# Patient Record
Sex: FEMALE | Race: WHITE | NOT HISPANIC OR LATINO | Employment: OTHER | URBAN - METROPOLITAN AREA
[De-identification: names, ages, dates, MRNs, and addresses within clinical notes are randomized per-mention and may not be internally consistent; named-entity substitution may affect disease eponyms.]

---

## 2017-03-02 ENCOUNTER — HISTORICAL (OUTPATIENT)
Dept: LAB | Facility: HOSPITAL | Age: 77
End: 2017-03-02

## 2017-06-26 ENCOUNTER — HISTORICAL (OUTPATIENT)
Dept: LAB | Facility: HOSPITAL | Age: 77
End: 2017-06-26

## 2017-06-26 LAB
ALBUMIN SERPL-MCNC: 3.1 GM/DL (ref 3.4–5)
ALBUMIN/GLOB SERPL: 0.7 {RATIO}
ALP SERPL-CCNC: 119 UNIT/L (ref 38–126)
ALT SERPL-CCNC: 78 UNIT/L (ref 12–78)
AST SERPL-CCNC: 128 UNIT/L (ref 15–37)
BILIRUB SERPL-MCNC: 1.5 MG/DL (ref 0.2–1)
BILIRUBIN DIRECT+TOT PNL SERPL-MCNC: 0.7 MG/DL (ref 0–0.2)
BILIRUBIN DIRECT+TOT PNL SERPL-MCNC: 0.8 MG/DL (ref 0–0.8)
BUN SERPL-MCNC: 20 MG/DL (ref 7–18)
CALCIUM SERPL-MCNC: 9 MG/DL (ref 8.5–10.1)
CHLORIDE SERPL-SCNC: 109 MMOL/L (ref 98–107)
CHOLEST SERPL-MCNC: 133 MG/DL (ref 0–200)
CHOLEST/HDLC SERPL: 2.2 {RATIO} (ref 0–4)
CO2 SERPL-SCNC: 25 MMOL/L (ref 21–32)
CREAT SERPL-MCNC: 0.67 MG/DL (ref 0.55–1.02)
GLOBULIN SER-MCNC: 4.5 GM/DL (ref 2.4–3.5)
GLUCOSE SERPL-MCNC: 110 MG/DL (ref 74–106)
HDLC SERPL-MCNC: 61 MG/DL (ref 35–60)
LDLC SERPL CALC-MCNC: 59 MG/DL (ref 0–129)
POTASSIUM SERPL-SCNC: 4.8 MMOL/L (ref 3.5–5.1)
PROT SERPL-MCNC: 7.6 GM/DL (ref 6.4–8.2)
SODIUM SERPL-SCNC: 141 MMOL/L (ref 136–145)
T4 FREE SERPL-MCNC: 1.35 NG/DL (ref 0.76–1.46)
TRIGL SERPL-MCNC: 67 MG/DL (ref 30–150)
TSH SERPL-ACNC: 1.55 MIU/ML (ref 0.36–3.74)
VLDLC SERPL CALC-MCNC: 13 MG/DL

## 2017-12-27 ENCOUNTER — HISTORICAL (OUTPATIENT)
Dept: LAB | Facility: HOSPITAL | Age: 77
End: 2017-12-27

## 2017-12-27 LAB
ABS NEUT (OLG): 3.68 X10(3)/MCL (ref 2.1–9.2)
ALBUMIN SERPL-MCNC: 3.8 GM/DL (ref 3.4–5)
ALBUMIN/GLOB SERPL: 0.8 RATIO (ref 1.1–2)
ALP SERPL-CCNC: 96 UNIT/L (ref 38–126)
ALT SERPL-CCNC: 26 UNIT/L (ref 12–78)
ANISOCYTOSIS BLD QL SMEAR: 1
APPEARANCE, UA: ABNORMAL
AST SERPL-CCNC: 41 UNIT/L (ref 15–37)
BACTERIA SPEC CULT: ABNORMAL /HPF
BILIRUB SERPL-MCNC: 1.1 MG/DL (ref 0.2–1)
BILIRUB UR QL STRIP: NEGATIVE
BILIRUBIN DIRECT+TOT PNL SERPL-MCNC: 0.3 MG/DL (ref 0–0.5)
BILIRUBIN DIRECT+TOT PNL SERPL-MCNC: 0.8 MG/DL (ref 0–0.8)
BUN SERPL-MCNC: 16 MG/DL (ref 7–18)
CALCIUM SERPL-MCNC: 8.8 MG/DL (ref 8.5–10.1)
CHLORIDE SERPL-SCNC: 110 MMOL/L (ref 98–107)
CO2 SERPL-SCNC: 28 MMOL/L (ref 21–32)
COLOR UR: YELLOW
CREAT SERPL-MCNC: 0.75 MG/DL (ref 0.55–1.02)
ERYTHROCYTE [DISTWIDTH] IN BLOOD BY AUTOMATED COUNT: 14.5 % (ref 11.5–17)
GLOBULIN SER-MCNC: 4.5 GM/DL (ref 2.4–3.5)
GLUCOSE (UA): NEGATIVE
GLUCOSE SERPL-MCNC: 100 MG/DL (ref 74–106)
HCT VFR BLD AUTO: 38.4 % (ref 37–47)
HGB BLD-MCNC: 12.8 GM/DL (ref 12–16)
HGB UR QL STRIP: ABNORMAL
KETONES UR QL STRIP: NEGATIVE
LEUKOCYTE ESTERASE UR QL STRIP: ABNORMAL
LYMPHOCYTES NFR BLD MANUAL: 8 %
LYMPHOCYTES NFR BLD MANUAL: 9 % (ref 13–40)
MCH RBC QN AUTO: 31.3 PG (ref 27–31)
MCHC RBC AUTO-ENTMCNC: 33.3 GM/DL (ref 33–36)
MCV RBC AUTO: 93.9 FL (ref 80–94)
MONOCYTES NFR BLD MANUAL: 11 % (ref 2–11)
NEUTROPHILS NFR BLD MANUAL: 72 % (ref 47–80)
NITRITE UR QL STRIP: NEGATIVE
OVALOCYTES BLD QL SMEAR: 1
PH UR STRIP: 6 [PH] (ref 5–9)
PLATELET # BLD AUTO: 67 X10(3)/MCL (ref 130–400)
PLATELET # BLD EST: ABNORMAL 10*3/UL
PMV BLD AUTO: 9.8 FL (ref 7.4–10.4)
POLYCHROMASIA BLD QL SMEAR: 1
POTASSIUM SERPL-SCNC: 4.5 MMOL/L (ref 3.5–5.1)
PROT SERPL-MCNC: 8.3 GM/DL (ref 6.4–8.2)
PROT UR QL STRIP: NEGATIVE
RBC # BLD AUTO: 4.09 X10(6)/MCL (ref 4.2–5.4)
RBC #/AREA URNS HPF: 15 /HPF (ref 0–2)
SODIUM SERPL-SCNC: 139 MMOL/L (ref 136–145)
SP GR UR STRIP: 1.01 (ref 1–1.03)
SQUAMOUS EPITHELIAL, UA: ABNORMAL
T4 FREE SERPL-MCNC: 1.03 NG/DL (ref 0.76–1.46)
TSH SERPL-ACNC: 5.15 MIU/ML (ref 0.36–3.74)
UROBILINOGEN UR STRIP-ACNC: 0.2
WBC # SPEC AUTO: 5.9 X10(3)/MCL (ref 4.5–11.5)
WBC #/AREA URNS HPF: 89 /HPF (ref 0–3)

## 2018-08-28 ENCOUNTER — HISTORICAL (OUTPATIENT)
Dept: LAB | Facility: HOSPITAL | Age: 78
End: 2018-08-28

## 2018-08-28 LAB
CHOLEST SERPL-MCNC: 125 MG/DL (ref 0–200)
CHOLEST/HDLC SERPL: 2.1 {RATIO} (ref 0–4)
HDLC SERPL-MCNC: 60 MG/DL (ref 35–60)
LDLC SERPL CALC-MCNC: 55 MG/DL (ref 0–129)
PROGEST SERPL-MCNC: <0.21 NG/ML
T4 FREE SERPL-MCNC: 1.5 NG/DL (ref 0.76–1.46)
TRIGL SERPL-MCNC: 52 MG/DL (ref 30–150)
TSH SERPL-ACNC: 0.29 MIU/L (ref 0.36–3.74)
VLDLC SERPL CALC-MCNC: 10 MG/DL

## 2018-12-18 ENCOUNTER — HISTORICAL (OUTPATIENT)
Dept: RADIOLOGY | Facility: HOSPITAL | Age: 78
End: 2018-12-18

## 2018-12-18 LAB
ABS NEUT (OLG): 2.28 X10(3)/MCL (ref 2.1–9.2)
ALBUMIN SERPL-MCNC: 4.1 GM/DL (ref 3.4–5)
ALBUMIN/GLOB SERPL: 1 {RATIO}
ALP SERPL-CCNC: 76 UNIT/L (ref 38–126)
ALT SERPL-CCNC: 20 UNIT/L (ref 12–78)
ANISOCYTOSIS BLD QL SMEAR: 0
APTT PPP: 33.3 SECOND(S) (ref 24.8–36.9)
AST SERPL-CCNC: 27 UNIT/L (ref 15–37)
BASOPHILS NFR BLD MANUAL: 1 % (ref 0–2)
BILIRUB SERPL-MCNC: 1.5 MG/DL (ref 0.2–1)
BILIRUBIN DIRECT+TOT PNL SERPL-MCNC: 0.2 MG/DL (ref 0–0.2)
BILIRUBIN DIRECT+TOT PNL SERPL-MCNC: 1.3 MG/DL (ref 0–0.8)
BUN SERPL-MCNC: 18 MG/DL (ref 7–18)
CALCIUM SERPL-MCNC: 9.1 MG/DL (ref 8.5–10.1)
CHLORIDE SERPL-SCNC: 105 MMOL/L (ref 98–107)
CO2 SERPL-SCNC: 27 MMOL/L (ref 21–32)
CREAT SERPL-MCNC: 0.75 MG/DL (ref 0.55–1.02)
EOSINOPHIL NFR BLD MANUAL: 2 % (ref 0–8)
ERYTHROCYTE [DISTWIDTH] IN BLOOD BY AUTOMATED COUNT: 14.4 % (ref 11.5–17)
GLOBULIN SER-MCNC: 4 GM/DL (ref 2.4–3.5)
GLUCOSE SERPL-MCNC: 101 MG/DL (ref 74–106)
HCT VFR BLD AUTO: 38.7 % (ref 37–47)
HGB BLD-MCNC: 12.2 GM/DL (ref 12–16)
HYPOCHROMIA BLD QL SMEAR: 0
INR PPP: 1.1 (ref 0–1.3)
LYMPHOCYTES NFR BLD MANUAL: 17 % (ref 13–40)
MACROCYTES BLD QL SMEAR: 0
MCH RBC QN AUTO: 27.5 PG (ref 27–31)
MCHC RBC AUTO-ENTMCNC: 31.5 GM/DL (ref 33–36)
MCV RBC AUTO: 87.2 FL (ref 80–94)
MICROCYTES BLD QL SMEAR: 0
MONOCYTES NFR BLD MANUAL: 8 % (ref 2–11)
NEUTROPHILS NFR BLD MANUAL: 72 % (ref 47–80)
PLATELET # BLD AUTO: 81 X10(3)/MCL (ref 130–400)
PLATELET # BLD EST: ABNORMAL 10*3/UL
PMV BLD AUTO: 9.8 FL (ref 7.4–10.4)
POC CREATININE: 0.7 MG/DL (ref 0.6–1.3)
POIKILOCYTOSIS BLD QL SMEAR: 1
POLYCHROMASIA BLD QL SMEAR: 0
POTASSIUM SERPL-SCNC: 3.7 MMOL/L (ref 3.5–5.1)
PROT SERPL-MCNC: 8.1 GM/DL (ref 6.4–8.2)
PROTHROMBIN TIME: 14.2 SECOND(S) (ref 12.2–14.7)
RBC # BLD AUTO: 4.44 X10(6)/MCL (ref 4.2–5.4)
SODIUM SERPL-SCNC: 139 MMOL/L (ref 136–145)
T4 FREE SERPL-MCNC: 1.37 NG/DL (ref 0.76–1.46)
TSH SERPL-ACNC: 0.31 MIU/L (ref 0.36–3.74)
WBC # SPEC AUTO: 3.8 X10(3)/MCL (ref 4.5–11.5)

## 2019-06-17 ENCOUNTER — HISTORICAL (OUTPATIENT)
Dept: LAB | Facility: HOSPITAL | Age: 79
End: 2019-06-17

## 2019-06-17 LAB
APPEARANCE, UA: CLEAR
BACTERIA SPEC CULT: ABNORMAL /HPF
BILIRUB UR QL STRIP: NEGATIVE
COLOR UR: ABNORMAL
GLUCOSE (UA): NEGATIVE
HGB UR QL STRIP: NEGATIVE
KETONES UR QL STRIP: NEGATIVE
LEUKOCYTE ESTERASE UR QL STRIP: ABNORMAL
NITRITE UR QL STRIP: NEGATIVE
PH UR STRIP: 6.5 [PH] (ref 5–9)
PROT UR QL STRIP: NEGATIVE
RBC #/AREA URNS HPF: ABNORMAL /[HPF]
SP GR UR STRIP: 1.02 (ref 1–1.03)
SQUAMOUS EPITHELIAL, UA: ABNORMAL
T4 FREE SERPL-MCNC: 1.19 NG/DL (ref 0.76–1.46)
TSH SERPL-ACNC: 2.32 MIU/L (ref 0.36–3.74)
UROBILINOGEN UR STRIP-ACNC: 1
WBC #/AREA URNS HPF: 25 /HPF (ref 0–3)

## 2019-07-30 ENCOUNTER — HISTORICAL (OUTPATIENT)
Dept: RADIOLOGY | Facility: HOSPITAL | Age: 79
End: 2019-07-30

## 2019-07-30 LAB — POC CREATININE: 0.7 MG/DL (ref 0.6–1.3)

## 2019-12-16 ENCOUNTER — HISTORICAL (OUTPATIENT)
Dept: LAB | Facility: HOSPITAL | Age: 79
End: 2019-12-16

## 2019-12-16 LAB
APPEARANCE, UA: ABNORMAL
BACTERIA SPEC CULT: ABNORMAL /HPF
BILIRUB UR QL STRIP: NEGATIVE
COLOR UR: YELLOW
GLUCOSE (UA): NEGATIVE
HGB UR QL STRIP: ABNORMAL
KETONES UR QL STRIP: NEGATIVE
LEUKOCYTE ESTERASE UR QL STRIP: ABNORMAL
NITRITE UR QL STRIP: NEGATIVE
PH UR STRIP: >=9 [PH] (ref 5–9)
PROT UR QL STRIP: ABNORMAL
RBC #/AREA URNS HPF: 89 /HPF (ref 0–2)
SP GR UR STRIP: 1.02 (ref 1–1.03)
SQUAMOUS EPITHELIAL, UA: ABNORMAL
UROBILINOGEN UR STRIP-ACNC: 1
WBC #/AREA URNS HPF: 176 /HPF (ref 0–3)

## 2019-12-17 ENCOUNTER — HISTORICAL (OUTPATIENT)
Dept: LAB | Facility: HOSPITAL | Age: 79
End: 2019-12-17

## 2019-12-17 LAB
ABS NEUT (OLG): 2.81 X10(3)/MCL (ref 2.1–9.2)
ALBUMIN SERPL-MCNC: 4 GM/DL (ref 3.4–5)
ALBUMIN/GLOB SERPL: 1 RATIO (ref 1.1–2)
ALP SERPL-CCNC: 73 UNIT/L (ref 38–126)
ALT SERPL-CCNC: 23 UNIT/L (ref 12–78)
AST SERPL-CCNC: 23 UNIT/L (ref 15–37)
BASOPHILS # BLD AUTO: 0 X10(3)/MCL (ref 0–0.2)
BASOPHILS NFR BLD AUTO: 1 %
BILIRUB SERPL-MCNC: 0.7 MG/DL (ref 0.2–1)
BILIRUBIN DIRECT+TOT PNL SERPL-MCNC: 0.2 MG/DL (ref 0–0.5)
BILIRUBIN DIRECT+TOT PNL SERPL-MCNC: 0.5 MG/DL (ref 0–0.8)
BUN SERPL-MCNC: 21 MG/DL (ref 7–18)
CALCIUM SERPL-MCNC: 9.6 MG/DL (ref 8.5–10.1)
CHLORIDE SERPL-SCNC: 107 MMOL/L (ref 98–107)
CHOLEST SERPL-MCNC: 135 MG/DL (ref 0–200)
CHOLEST/HDLC SERPL: 2.6 {RATIO} (ref 0–4)
CO2 SERPL-SCNC: 30 MMOL/L (ref 21–32)
CREAT SERPL-MCNC: 0.82 MG/DL (ref 0.55–1.02)
DEPRECATED CALCIDIOL+CALCIFEROL SERPL-MC: 24.24 NG/ML (ref 30–80)
EOSINOPHIL # BLD AUTO: 0.2 X10(3)/MCL (ref 0–0.9)
EOSINOPHIL NFR BLD AUTO: 3 %
ERYTHROCYTE [DISTWIDTH] IN BLOOD BY AUTOMATED COUNT: 14.8 % (ref 11.5–17)
EST. AVERAGE GLUCOSE BLD GHB EST-MCNC: 117 MG/DL
GLOBULIN SER-MCNC: 4.1 GM/DL (ref 2.4–3.5)
GLUCOSE SERPL-MCNC: 110 MG/DL (ref 74–106)
HBA1C MFR BLD: 5.7 % (ref 4.2–6.3)
HCT VFR BLD AUTO: 39.5 % (ref 37–47)
HDLC SERPL-MCNC: 52 MG/DL (ref 35–60)
HGB BLD-MCNC: 11.9 GM/DL (ref 12–16)
LDLC SERPL CALC-MCNC: 68 MG/DL (ref 0–129)
LYMPHOCYTES # BLD AUTO: 0.8 X10(3)/MCL (ref 0.6–4.6)
LYMPHOCYTES NFR BLD AUTO: 18 %
MCH RBC QN AUTO: 26.7 PG (ref 27–31)
MCHC RBC AUTO-ENTMCNC: 30.1 GM/DL (ref 33–36)
MCV RBC AUTO: 88.6 FL (ref 80–94)
MONOCYTES # BLD AUTO: 0.6 X10(3)/MCL (ref 0.1–1.3)
MONOCYTES NFR BLD AUTO: 13 %
NEUTROPHILS # BLD AUTO: 2.81 X10(3)/MCL (ref 2.1–9.2)
NEUTROPHILS NFR BLD AUTO: 64 %
PLATELET # BLD AUTO: 100 X10(3)/MCL (ref 130–400)
PMV BLD AUTO: 10.9 FL (ref 9.4–12.4)
POTASSIUM SERPL-SCNC: 3.8 MMOL/L (ref 3.5–5.1)
PROT SERPL-MCNC: 8.1 GM/DL (ref 6.4–8.2)
RBC # BLD AUTO: 4.46 X10(6)/MCL (ref 4.2–5.4)
SODIUM SERPL-SCNC: 141 MMOL/L (ref 136–145)
T4 FREE SERPL-MCNC: 1.31 NG/DL (ref 0.76–1.46)
TRIGL SERPL-MCNC: 74 MG/DL (ref 30–150)
TSH SERPL-ACNC: 0.59 MIU/L (ref 0.36–3.74)
VLDLC SERPL CALC-MCNC: 15 MG/DL
WBC # SPEC AUTO: 4.4 X10(3)/MCL (ref 4.5–11.5)

## 2020-06-10 ENCOUNTER — HISTORICAL (OUTPATIENT)
Dept: ADMINISTRATIVE | Facility: HOSPITAL | Age: 80
End: 2020-06-10

## 2020-06-10 LAB
ABS NEUT (OLG): 2.01 X10(3)/MCL (ref 2.1–9.2)
ALBUMIN SERPL-MCNC: 4.3 GM/DL (ref 3.4–5)
ALBUMIN/GLOB SERPL: 1.3 RATIO (ref 1.1–2)
ALP SERPL-CCNC: 65 UNIT/L (ref 40–150)
ALT SERPL-CCNC: 17 UNIT/L (ref 0–55)
APPEARANCE, UA: CLEAR
AST SERPL-CCNC: 26 UNIT/L (ref 5–34)
BACTERIA SPEC CULT: ABNORMAL /HPF
BASOPHILS NFR BLD MANUAL: 3 % (ref 0–2)
BILIRUB SERPL-MCNC: 0.9 MG/DL
BILIRUB UR QL STRIP: NEGATIVE
BILIRUBIN DIRECT+TOT PNL SERPL-MCNC: 0.3 MG/DL (ref 0–0.5)
BILIRUBIN DIRECT+TOT PNL SERPL-MCNC: 0.6 MG/DL (ref 0–0.8)
BUN SERPL-MCNC: 18.7 MG/DL (ref 9.8–20.1)
CALCIUM SERPL-MCNC: 9 MG/DL (ref 8.4–10.2)
CHLORIDE SERPL-SCNC: 107 MMOL/L (ref 98–107)
CHOLEST SERPL-MCNC: 150 MG/DL
CHOLEST/HDLC SERPL: 3 {RATIO} (ref 0–5)
CO2 SERPL-SCNC: 24 MMOL/L (ref 23–31)
COLOR UR: ABNORMAL
CREAT SERPL-MCNC: 0.71 MG/DL (ref 0.55–1.02)
DEPRECATED CALCIDIOL+CALCIFEROL SERPL-MC: 41.2 NG/ML (ref 6.6–49.9)
EOSINOPHIL NFR BLD MANUAL: 3 % (ref 0–8)
ERYTHROCYTE [DISTWIDTH] IN BLOOD BY AUTOMATED COUNT: 14.9 % (ref 11.5–17)
EST. AVERAGE GLUCOSE BLD GHB EST-MCNC: 114 MG/DL
GLOBULIN SER-MCNC: 3.3 GM/DL (ref 2.4–3.5)
GLUCOSE (UA): NEGATIVE
GLUCOSE SERPL-MCNC: 95 MG/DL (ref 82–115)
HBA1C MFR BLD: 5.6 %
HCT VFR BLD AUTO: 37 % (ref 37–47)
HDLC SERPL-MCNC: 48 MG/DL (ref 35–60)
HGB BLD-MCNC: 11.3 GM/DL (ref 12–16)
HGB UR QL STRIP: ABNORMAL
KETONES UR QL STRIP: NEGATIVE
LDLC SERPL CALC-MCNC: 89 MG/DL (ref 50–140)
LEUKOCYTE ESTERASE UR QL STRIP: ABNORMAL
LYMPHOCYTES NFR BLD MANUAL: 20 % (ref 13–40)
MCH RBC QN AUTO: 27 PG (ref 27–31)
MCHC RBC AUTO-ENTMCNC: 30.5 GM/DL (ref 33–36)
MCV RBC AUTO: 88.3 FL (ref 80–94)
MONOCYTES NFR BLD MANUAL: 10 % (ref 2–11)
NEUTROPHILS NFR BLD MANUAL: 64 % (ref 47–80)
NITRITE UR QL STRIP: NEGATIVE
PH UR STRIP: 5 [PH] (ref 5–9)
PLATELET # BLD AUTO: 99 X10(3)/MCL (ref 130–400)
PLATELET # BLD EST: ABNORMAL 10*3/UL
PMV BLD AUTO: 11.1 FL (ref 9.4–12.4)
POTASSIUM SERPL-SCNC: 3.9 MMOL/L (ref 3.5–5.1)
PROT SERPL-MCNC: 7.6 GM/DL (ref 5.8–7.6)
PROT UR QL STRIP: NEGATIVE
RBC # BLD AUTO: 4.19 X10(6)/MCL (ref 4.2–5.4)
RBC #/AREA URNS HPF: 22 /HPF (ref 0–2)
RBC MORPH BLD: NORMAL
SODIUM SERPL-SCNC: 139 MMOL/L (ref 136–145)
SP GR UR STRIP: 1.02 (ref 1–1.03)
SQUAMOUS EPITHELIAL, UA: ABNORMAL
T4 FREE SERPL-MCNC: 1.14 NG/DL (ref 0.7–1.48)
TRIGL SERPL-MCNC: 65 MG/DL (ref 37–140)
TSH SERPL-ACNC: 1.63 UIU/ML (ref 0.35–4.94)
UROBILINOGEN UR STRIP-ACNC: 0.2
VLDLC SERPL CALC-MCNC: 13 MG/DL
WBC # SPEC AUTO: 3.7 X10(3)/MCL (ref 4.5–11.5)
WBC #/AREA URNS HPF: ABNORMAL /[HPF]

## 2020-12-15 ENCOUNTER — HISTORICAL (OUTPATIENT)
Dept: ADMINISTRATIVE | Facility: HOSPITAL | Age: 80
End: 2020-12-15

## 2020-12-15 LAB
ABS NEUT (OLG): 1.4 X10(3)/MCL (ref 2.1–9.2)
ALBUMIN SERPL-MCNC: 4.3 GM/DL (ref 3.4–4.8)
ALBUMIN/GLOB SERPL: 1.2 RATIO (ref 1.1–2)
ALP SERPL-CCNC: 74 UNIT/L (ref 40–150)
ALT SERPL-CCNC: 14 UNIT/L (ref 0–55)
AST SERPL-CCNC: 25 UNIT/L (ref 5–34)
BASOPHILS NFR BLD MANUAL: 1 % (ref 0–2)
BILIRUB SERPL-MCNC: 0.8 MG/DL
BILIRUBIN DIRECT+TOT PNL SERPL-MCNC: 0.3 MG/DL (ref 0–0.5)
BILIRUBIN DIRECT+TOT PNL SERPL-MCNC: 0.5 MG/DL (ref 0–0.8)
BUN SERPL-MCNC: 19.5 MG/DL (ref 9.8–20.1)
CALCIUM SERPL-MCNC: 9.2 MG/DL (ref 8.4–10.2)
CHLORIDE SERPL-SCNC: 107 MMOL/L (ref 98–107)
CHOLEST SERPL-MCNC: 141 MG/DL
CHOLEST/HDLC SERPL: 3 {RATIO} (ref 0–5)
CO2 SERPL-SCNC: 26 MMOL/L (ref 23–31)
CREAT SERPL-MCNC: 0.79 MG/DL (ref 0.55–1.02)
EOSINOPHIL NFR BLD MANUAL: 5 % (ref 0–8)
ERYTHROCYTE [DISTWIDTH] IN BLOOD BY AUTOMATED COUNT: 17.4 % (ref 11.5–17)
EST. AVERAGE GLUCOSE BLD GHB EST-MCNC: 114 MG/DL
GLOBULIN SER-MCNC: 3.5 GM/DL (ref 2.4–3.5)
GLUCOSE SERPL-MCNC: 100 MG/DL (ref 82–115)
HBA1C MFR BLD: 5.6 %
HCT VFR BLD AUTO: 39.1 % (ref 37–47)
HDLC SERPL-MCNC: 44 MG/DL (ref 35–60)
HGB BLD-MCNC: 11.5 GM/DL (ref 12–16)
LDLC SERPL CALC-MCNC: 83 MG/DL (ref 50–140)
LYMPHOCYTES NFR BLD MANUAL: 23 % (ref 13–40)
MCH RBC QN AUTO: 25.2 PG (ref 27–31)
MCHC RBC AUTO-ENTMCNC: 29.4 GM/DL (ref 33–36)
MCV RBC AUTO: 85.6 FL (ref 80–94)
MONOCYTES NFR BLD MANUAL: 14 % (ref 2–11)
NEUTROPHILS NFR BLD MANUAL: 58 % (ref 47–80)
OVALOCYTES BLD QL SMEAR: 1 (ref 0–0)
PLATELET # BLD AUTO: 86 X10(3)/MCL (ref 130–400)
PLATELET # BLD EST: ABNORMAL 10*3/UL
PMV BLD AUTO: 11.2 FL (ref 7.4–10.4)
POIKILOCYTOSIS BLD QL SMEAR: 1
POTASSIUM SERPL-SCNC: 4 MMOL/L (ref 3.5–5.1)
PROT SERPL-MCNC: 7.8 GM/DL (ref 5.8–7.6)
RBC # BLD AUTO: 4.57 X10(6)/MCL (ref 4.2–5.4)
SODIUM SERPL-SCNC: 141 MMOL/L (ref 136–145)
T4 FREE SERPL-MCNC: 1.25 NG/DL (ref 0.7–1.48)
TRIGL SERPL-MCNC: 70 MG/DL (ref 37–140)
TSH SERPL-ACNC: 1.99 UIU/ML (ref 0.35–4.94)
VLDLC SERPL CALC-MCNC: 14 MG/DL
WBC # SPEC AUTO: 2.8 X10(3)/MCL (ref 4.5–11.5)

## 2021-06-22 ENCOUNTER — HISTORICAL (OUTPATIENT)
Dept: ADMINISTRATIVE | Facility: HOSPITAL | Age: 81
End: 2021-06-22

## 2021-06-22 LAB
T4 FREE SERPL-MCNC: 1.23 NG/DL (ref 0.7–1.48)
TSH SERPL-ACNC: 0.71 UIU/ML (ref 0.35–4.94)

## 2021-10-11 ENCOUNTER — NURSE TRIAGE (OUTPATIENT)
Dept: ADMINISTRATIVE | Facility: CLINIC | Age: 81
End: 2021-10-11

## 2022-04-10 ENCOUNTER — HISTORICAL (OUTPATIENT)
Dept: ADMINISTRATIVE | Facility: HOSPITAL | Age: 82
End: 2022-04-10
Payer: MEDICARE

## 2022-04-26 VITALS
BODY MASS INDEX: 19.17 KG/M2 | WEIGHT: 112.31 LBS | HEIGHT: 64 IN | DIASTOLIC BLOOD PRESSURE: 66 MMHG | OXYGEN SATURATION: 99 % | SYSTOLIC BLOOD PRESSURE: 118 MMHG

## 2022-05-11 RX ORDER — ALPRAZOLAM 2 MG/1
1 TABLET ORAL EVERY 12 HOURS
COMMUNITY
Start: 2022-04-13 | End: 2022-05-11

## 2022-05-11 RX ORDER — ALPRAZOLAM 2 MG/1
0.5 TABLET ORAL 2 TIMES DAILY
COMMUNITY
Start: 2022-01-07 | End: 2022-05-11 | Stop reason: SDUPTHER

## 2022-05-11 RX ORDER — ALPRAZOLAM 1 MG/1
0.5 TABLET ORAL 2 TIMES DAILY
Qty: 30 TABLET | Refills: 2 | Status: SHIPPED | OUTPATIENT
Start: 2022-05-11 | End: 2022-07-29

## 2022-05-11 NOTE — TELEPHONE ENCOUNTER
----- Message from Risa De La Rosa Patient Care Assistant sent at 5/11/2022 10:23 AM CDT -----  Regarding: med refill  Type:  RX Refill Request    Who Called: Pt   Refill or New Rx: Refill  RX Name and Strength: alprazolam 2 mg  How is the patient currently taking it? (ex. 1XDay): 1 tab daily 1/2 tab morning 1/2 tab night  Is this a 30 day or 90 day RX: 30  Preferred Pharmacy with phone number: [facility]  Local or Mail Order: Local  Ordering Provider: Dr. Angélica Dominguez  Would the patient rather a call back or a response via [facility]? C/b  Best Call Back Number: [phone]   Additional Information: pt req refill for today she has 2 pills left and she will be going out of town for a week and is wanting to get this refilled today so she has them for her trip. If we are unable to fill this before she leaves then she is wondering if we can send her prescription to a pharmacy where she will be in [place]. Please call pt back to discuss options for refilling this med.

## 2022-08-31 ENCOUNTER — TELEPHONE (OUTPATIENT)
Dept: FAMILY MEDICINE | Facility: CLINIC | Age: 82
End: 2022-08-31
Payer: MEDICARE

## 2022-08-31 DIAGNOSIS — Z12.31 VISIT FOR SCREENING MAMMOGRAM: Primary | ICD-10-CM

## 2022-08-31 NOTE — TELEPHONE ENCOUNTER
----- Message from Syed Gonzalez MA sent at 8/31/2022 11:20 AM CDT -----  LOV 4/13/22. Can MMG order be placed  ----- Message -----  From: Alfie Thompson  Sent: 8/31/2022  10:50 AM CDT  To: Angelica [facility] Staff    .Type:  Needs Medical Advice    Who Called: Tessa  Symptoms (please be specific):    How long has patient had these symptoms:    Pharmacy name and phone #:    Would the patient rather a call back or a response via [facility]?   Best Call Back Number: [phone]  Additional Information: She would like a call back from nurse re: mammogram whether she can get one as she is has been seen by her cancer doctor.

## 2022-09-19 LAB — BCS RECOMMENDATION EXT: NORMAL

## 2022-09-20 ENCOUNTER — DOCUMENTATION ONLY (OUTPATIENT)
Dept: ADMINISTRATIVE | Facility: HOSPITAL | Age: 82
End: 2022-09-20
Payer: MEDICARE

## 2022-10-31 DIAGNOSIS — F41.1 GENERALIZED ANXIETY DISORDER: ICD-10-CM

## 2022-10-31 RX ORDER — ALPRAZOLAM 2 MG/1
TABLET ORAL
Qty: 30 TABLET | Refills: 2 | OUTPATIENT
Start: 2022-10-31

## 2022-10-31 NOTE — TELEPHONE ENCOUNTER
Please see the attached refill request.  LOV 4/13/22  Needs wellness scheduled, please schedule appt

## 2022-11-02 ENCOUNTER — OFFICE VISIT (OUTPATIENT)
Dept: FAMILY MEDICINE | Facility: CLINIC | Age: 82
End: 2022-11-02
Payer: MEDICARE

## 2022-11-02 ENCOUNTER — TELEPHONE (OUTPATIENT)
Dept: FAMILY MEDICINE | Facility: CLINIC | Age: 82
End: 2022-11-02

## 2022-11-02 VITALS
WEIGHT: 105.81 LBS | SYSTOLIC BLOOD PRESSURE: 119 MMHG | TEMPERATURE: 98 F | HEART RATE: 73 BPM | RESPIRATION RATE: 17 BRPM | OXYGEN SATURATION: 99 % | HEIGHT: 63 IN | DIASTOLIC BLOOD PRESSURE: 69 MMHG | BODY MASS INDEX: 18.75 KG/M2

## 2022-11-02 DIAGNOSIS — C22.0 HEPATOCELLULAR CARCINOMA: ICD-10-CM

## 2022-11-02 DIAGNOSIS — F41.1 GENERALIZED ANXIETY DISORDER: Primary | ICD-10-CM

## 2022-11-02 DIAGNOSIS — I85.00 ESOPHAGEAL VARICES WITHOUT BLEEDING, UNSPECIFIED ESOPHAGEAL VARICES TYPE: ICD-10-CM

## 2022-11-02 DIAGNOSIS — N32.81 OVERACTIVE BLADDER: ICD-10-CM

## 2022-11-02 PROCEDURE — 99214 PR OFFICE/OUTPT VISIT, EST, LEVL IV, 30-39 MIN: ICD-10-PCS | Mod: ,,, | Performed by: PHYSICIAN ASSISTANT

## 2022-11-02 PROCEDURE — 99214 OFFICE O/P EST MOD 30 MIN: CPT | Mod: ,,, | Performed by: PHYSICIAN ASSISTANT

## 2022-11-02 RX ORDER — ALPRAZOLAM 2 MG/1
TABLET ORAL
Qty: 30 TABLET | Refills: 0 | Status: SHIPPED | OUTPATIENT
Start: 2022-11-02 | End: 2022-12-05 | Stop reason: SDUPTHER

## 2022-11-02 RX ORDER — LEVOTHYROXINE SODIUM 50 UG/1
60 TABLET ORAL DAILY
COMMUNITY
Start: 2022-10-27 | End: 2022-11-02 | Stop reason: SDUPTHER

## 2022-11-02 RX ORDER — FESOTERODINE FUMARATE 4 MG/1
4 TABLET, EXTENDED RELEASE ORAL DAILY
Qty: 15 TABLET | Refills: 0 | Status: SHIPPED | OUTPATIENT
Start: 2022-11-02 | End: 2023-02-13

## 2022-11-02 NOTE — Clinical Note
Please request records from Dr. Pierce at [facility] in [place] recent record and most recent lab results.

## 2022-11-02 NOTE — PROGRESS NOTES
SUBJECTIVE:     History of Present Illness      Chief Complaint: Follow-up (Meds)    HPI:  Patient is a 82 y.o. year old female who presents to clinic for follow up on anxiety. PMH of hypothyroidism, esophageal varices, hepatocellular carcinoma, and generalized anxiety disorder.    Patient reports anxiety symptoms secondary to diagnosis of hepatocellular carcinoma. Patient reports she is doing well on medication without reported side effects. Medication is controlling anxiety symptoms.     Follows with hepatologist Dr. Pierce at [facility] in [place]. Patient states she was told she was cancer free 4 months ago as she had three negative scans. Next appointment is in 3 months. Will request records. Patient reports she had labs done recently through Dr. Pierce.     History of esophageal varices currently asymptomatic. Follows with GI Dr. Bonilla.     Patient states she has overactive bladder and frequent urination. Symptoms have been present for years although becoming more troublesome. Denies UTI symptoms including dysuria, flank pain, and blood in the urine. This affects her when she travels long distances. Patient inquires about starting Toviaz for overactive bladder. Denies history of kidney disease, GI obstruction, urinary retention, and glaucoma. Patient states she would use this as needed for symptoms.     Review of Systems:  A comprehensive review of systems was performed and was negative except as noted in HPI     Previous History      Review of patient's allergies indicates:  No Known Allergies    History reviewed. No pertinent past medical history.  Current Outpatient Medications   Medication Instructions    ALPRAZolam (XANAX) 2 MG Tab TAKE 1/2 TABLET BY MOUTH EVERY 12 HOURS    levothyroxine (SYNTHROID) 75 MCG tablet TAKE 1 TABLET BY MOUTH ON MONDAY, WEDNESDAY,FRIDAY,SATURDAY, AND SUNDAY    levothyroxine (SYNTHROID) 60 mcg, Oral, Daily     Past Surgical History:   Procedure Laterality Date    EYE  "SURGERY  2018     History reviewed. No pertinent family history.    Social History     Tobacco Use    Smoking status: Never     Passive exposure: Never    Smokeless tobacco: Never   Substance Use Topics    Alcohol use: Never    Drug use: Never      Health Maintenance      Health Maintenance   Topic Date Due    TETANUS VACCINE  Never done    DEXA Scan  01/29/2024    Lipid Panel  12/15/2025     OBJECTIVE:     Physical Exam      Vital Signs Reviewed   /69 (BP Location: Left arm, Patient Position: Sitting, BP Method: Medium (Automatic))   Pulse 73   Temp 97.7 °F (36.5 °C) (Oral)   Resp 17   Ht 5' 3" (1.6 m)   Wt 48 kg (105 lb 12.8 oz)   SpO2 99%   BMI 18.74 kg/m²     Physical Exam:  General: Alert, well nourished, no acute distress, non-toxic appearing.   Eyes: Anicteric sclera, without conjunctival injection, normal lids, no purulent drainage, EOMs grossly intact.   Neck: Supple, full ROM, no rigidity, no cervical adenopathy.   Cardio: Normal rate and rhythm without murmurs, gallops, or rubs.   Resp: Respirations even and unlabored, clear to auscultation bilaterally.   Skin: No rashes or open lesions noted.   MSK: No swelling. No abrasions or signs of trauma. Ambulating without assistance.   Neuro: CN1-12 intact grossly. No focal deficits noted. Facial expressions even.      Assessment/Plan      1. Generalized anxiety disorder   Continue Xanax PRN with close monitoring.   Denies suicidal thought or ideation.      2. Esophageal varices without bleeding, unspecified esophageal varices type   Currently asymptomatic.   Continue follow up and recommendation per Dr. Bonilla.      3. Hepatocellular carcinoma   Follows with hepatologist Dr. Pierce at [place] in [place].   Patient reports she is cancer free as last 4 scans were negative.   Reports she had labs done through oncology recently.   Will request records.      4. Overactive bladder   Reports overactive bladder and frequent urination for years. "   Denies UTI symptoms including dysuria, flank pain, fever, chills, myalgias, and flank pain.   Asks to trial Toviaz.   Denies history of kidney disease, GI obstruction, urinary retention, and glaucoma.   Discussed risks and benefits in detail with  patient. Discussed with patient some elderly do not tolerate the medication and she should take this with caution. Patient expressed understanding and would like to proceed with treatment. Patient states she will take PRN.   Follow up in 6 weeks to discuss response to medication.   Discussed with any side effects she should discontinue the medication and call the office immediatly. Patient expressed understanding.        Follow up in 6 weeks for Wellness and to discuss response to starting Toviaz.   Recommended getting annual labs prior to appointment although patient declines as she has labs ordered by Dr. Pierce. Discussed we will request records and get additional labs if appropriate at Wellness.        Medication List with Changes/Refills   Current Medications    ALPRAZOLAM (XANAX) 2 MG TAB    TAKE 1/2 TABLET BY MOUTH EVERY 12 HOURS    LEVOTHYROXINE (SYNTHROID) 50 MCG TABLET    Take 60 mcg by mouth once daily.    LEVOTHYROXINE (SYNTHROID) 75 MCG TABLET    TAKE 1 TABLET BY MOUTH ON MONDAY, WEDNESDAY,FRIDAY,SATURDAY, AND SUNDAY     Follow up in about 6 weeks (around 12/14/2022) for Medicare Wellness.    Leigh Starr PA-C    Future Appointments   Date Time Provider Department Center   2/13/2023  1:30 PM Angélica Dominguez MD [facility]

## 2022-11-02 NOTE — TELEPHONE ENCOUNTER
----- Message from Jessie Norton sent at 11/2/2022 12:31 PM CDT -----  Regarding: refill  Type:  RX Refill Request    Who Called: pt  Refill or New Rx:new  RX Name and Strength:Toviaz  How is the patient currently taking it? (ex. 1XDay):  Is this a 30 day or 90 day RX:30  Refill or New Rx:refill  RX Name and Strength:ALPRAZolam (XANAX) 2 MG Tab  How is the patient currently taking it? (ex. 1XDay):1/2 tablet   Is this a 30 day or 90 day RX:30  Preferred Pharmacy [facility] phone number[facility] [address]  Local or Mail Order:local  Ordering Provider:ROCIO Starr  Would the patient rather a call back or a response via [facility]? C/b  Best Call Back Number:[phone]  Additional Information:  pt went to pharmacy to get medication but scripts weren't there

## 2022-11-15 ENCOUNTER — TELEPHONE (OUTPATIENT)
Dept: FAMILY MEDICINE | Facility: CLINIC | Age: 82
End: 2022-11-15
Payer: MEDICARE

## 2022-11-15 ENCOUNTER — DOCUMENTATION ONLY (OUTPATIENT)
Dept: FAMILY MEDICINE | Facility: CLINIC | Age: 82
End: 2022-11-15
Payer: MEDICARE

## 2022-11-15 NOTE — TELEPHONE ENCOUNTER
Spoke with [facility] R[facility] office , states patient has a pending surgery date for 12/7/2022, states patient may have misunderstood, however requesting cardiac clearance ,lab,ekg ,and cxr ( see previous encounter ) , pre-op visit is for this Thursday with Dr Ritter , please review and advise .    Requested lab from Dr Stan Deluca in [place]

## 2022-11-15 NOTE — TELEPHONE ENCOUNTER
----- Message from Latha Banegas sent at 11/15/2022 12:08 PM CST -----  Regarding: med records  .Type:  Needs Medical Advice    Who Called: Dr. Lennox Tipton's office  Symptoms (please be specific):    How long has patient had these symptoms:    Pharmacy name and phone #:    Would the patient rather a call back or a response via [facility]? Call back  Best Call Back Number: [phone]  Additional Information: Requesting a cardiac clearance being that pt will be under 3 hours of anesthesia, and EGK results from the last 6 mths, chest X-Ray results, CBC, chemistry, PT, PTT, and INR results and would like that all no later than Thurday before pt go in for pre opp.

## 2022-11-16 DIAGNOSIS — Z01.818 PRE-OPERATIVE CLEARANCE: Primary | ICD-10-CM

## 2022-11-16 DIAGNOSIS — I85.00 ESOPHAGEAL VARICES WITHOUT BLEEDING, UNSPECIFIED ESOPHAGEAL VARICES TYPE: ICD-10-CM

## 2022-11-17 ENCOUNTER — HOSPITAL ENCOUNTER (OUTPATIENT)
Dept: RADIOLOGY | Facility: HOSPITAL | Age: 82
Discharge: HOME | End: 2022-11-17
Attending: PHYSICIAN ASSISTANT
Payer: MEDICARE

## 2022-11-17 ENCOUNTER — TELEPHONE (OUTPATIENT)
Dept: FAMILY MEDICINE | Facility: CLINIC | Age: 82
End: 2022-11-17
Payer: MEDICARE

## 2022-11-17 DIAGNOSIS — Z01.818 PRE-OPERATIVE CLEARANCE: ICD-10-CM

## 2022-11-17 PROCEDURE — 71046 X-RAY EXAM CHEST 2 VIEWS: CPT | Mod: TC

## 2022-11-17 NOTE — TELEPHONE ENCOUNTER
----- Message from Carlos Manuel Lan sent at 11/17/2022 11:42 AM CST -----  .Type:  Needs Medical Advice    Who Called: pt  Would the patient rather a call back or a response via [facility]? Call back  Best Call Back Number: [phone]  Additional Information: pt called to cancel the appt due to not doing the surgery and pt would like a copy of her recent blood work for her records

## 2022-11-21 DIAGNOSIS — I45.10 RIGHT BUNDLE BRANCH BLOCK: Primary | ICD-10-CM

## 2022-11-22 ENCOUNTER — DOCUMENTATION ONLY (OUTPATIENT)
Dept: PRIMARY CARE CLINIC | Facility: CLINIC | Age: 82
End: 2022-11-22
Payer: MEDICARE

## 2022-12-05 DIAGNOSIS — F41.1 GENERALIZED ANXIETY DISORDER: ICD-10-CM

## 2022-12-05 RX ORDER — ALPRAZOLAM 2 MG/1
TABLET ORAL
Qty: 30 TABLET | Refills: 0 | Status: SHIPPED | OUTPATIENT
Start: 2022-12-05 | End: 2023-01-09

## 2022-12-05 RX ORDER — ALPRAZOLAM 2 MG/1
TABLET ORAL
Qty: 30 TABLET | Refills: 0 | OUTPATIENT
Start: 2022-12-05

## 2023-01-09 DIAGNOSIS — F41.1 GENERALIZED ANXIETY DISORDER: ICD-10-CM

## 2023-01-09 RX ORDER — ALPRAZOLAM 2 MG/1
TABLET ORAL
Qty: 30 TABLET | Refills: 0 | OUTPATIENT
Start: 2023-01-09

## 2023-01-23 DIAGNOSIS — N39.0 URINARY TRACT INFECTION WITHOUT HEMATURIA, SITE UNSPECIFIED: Primary | ICD-10-CM

## 2023-01-23 RX ORDER — NITROFURANTOIN 25; 75 MG/1; MG/1
100 CAPSULE ORAL 2 TIMES DAILY
Qty: 14 CAPSULE | Refills: 0 | Status: SHIPPED | OUTPATIENT
Start: 2023-01-23 | End: 2023-01-30

## 2023-01-24 ENCOUNTER — TELEPHONE (OUTPATIENT)
Dept: FAMILY MEDICINE | Facility: CLINIC | Age: 83
End: 2023-01-24
Payer: MEDICARE

## 2023-01-24 NOTE — TELEPHONE ENCOUNTER
Left vm telling pt a Rx was sent into her pharmacy and to please call with and questions or concerns

## 2023-01-24 NOTE — TELEPHONE ENCOUNTER
----- Message from Chanel Robb MD sent at 1/23/2023  6:32 PM CST -----  Regarding: FW: Cystitis  ABX sent   ----- Message -----  From: Martina Rae LPN  Sent: 1/23/2023   2:19 PM CST  To: Angélica Dominguez MD  Subject: Cystitis                                         Pt called saying she thinks she is getting a bladder infection and can we please call in medication. Please Advise Thanks  ----- Message -----  From: Berna Kraft MA  Sent: 1/23/2023  12:54 PM CST  To: Angelica [facility] Staff    .Type:  Needs Medical Advice    Who Called: pt    Symptoms (please be specific):  frequent urination    How long has patient had these symptoms: 4 days     Pharmacy name and phone #:   [facility] [place]  Would the patient rather a call back? Yes    Best Call Back Number: [phone]  Additional Information:  pt has cystias and she would like meds called in pt states doctor told her when it happens call in to get meds pt has been drinking cranberry juice but it is not helping

## 2023-02-13 ENCOUNTER — OFFICE VISIT (OUTPATIENT)
Dept: FAMILY MEDICINE | Facility: CLINIC | Age: 83
End: 2023-02-13
Payer: MEDICARE

## 2023-02-13 VITALS
OXYGEN SATURATION: 97 % | BODY MASS INDEX: 18.95 KG/M2 | DIASTOLIC BLOOD PRESSURE: 70 MMHG | WEIGHT: 107 LBS | SYSTOLIC BLOOD PRESSURE: 114 MMHG | HEART RATE: 70 BPM

## 2023-02-13 DIAGNOSIS — F41.1 GENERALIZED ANXIETY DISORDER: ICD-10-CM

## 2023-02-13 DIAGNOSIS — Z13.820 SCREENING FOR OSTEOPOROSIS: ICD-10-CM

## 2023-02-13 DIAGNOSIS — E03.9 HYPOTHYROIDISM, UNSPECIFIED TYPE: ICD-10-CM

## 2023-02-13 DIAGNOSIS — D61.818 OTHER PANCYTOPENIA: ICD-10-CM

## 2023-02-13 DIAGNOSIS — Z00.00 MEDICARE ANNUAL WELLNESS VISIT, SUBSEQUENT: Primary | ICD-10-CM

## 2023-02-13 DIAGNOSIS — I85.00 ESOPHAGEAL VARICES WITHOUT BLEEDING, UNSPECIFIED ESOPHAGEAL VARICES TYPE: ICD-10-CM

## 2023-02-13 DIAGNOSIS — R79.9 ABNORMAL FINDING OF BLOOD CHEMISTRY, UNSPECIFIED: ICD-10-CM

## 2023-02-13 DIAGNOSIS — N32.81 OVERACTIVE BLADDER: ICD-10-CM

## 2023-02-13 DIAGNOSIS — G12.9 SPINAL MUSCULAR ATROPHY, UNSPECIFIED: ICD-10-CM

## 2023-02-13 DIAGNOSIS — C22.0 HEPATOCELLULAR CARCINOMA: ICD-10-CM

## 2023-02-13 DIAGNOSIS — I70.0 ATHEROSCLEROSIS OF AORTA: ICD-10-CM

## 2023-02-13 DIAGNOSIS — Z78.0 POSTMENOPAUSAL: ICD-10-CM

## 2023-02-13 DIAGNOSIS — Z12.31 VISIT FOR SCREENING MAMMOGRAM: ICD-10-CM

## 2023-02-13 PROCEDURE — 99214 OFFICE O/P EST MOD 30 MIN: CPT | Mod: 25,,, | Performed by: FAMILY MEDICINE

## 2023-02-13 PROCEDURE — G0439 PPPS, SUBSEQ VISIT: HCPCS | Mod: ,,, | Performed by: FAMILY MEDICINE

## 2023-02-13 PROCEDURE — G0439 PR MEDICARE ANNUAL WELLNESS SUBSEQUENT VISIT: ICD-10-PCS | Mod: ,,, | Performed by: FAMILY MEDICINE

## 2023-02-13 PROCEDURE — 99214 PR OFFICE/OUTPT VISIT, EST, LEVL IV, 30-39 MIN: ICD-10-PCS | Mod: 25,,, | Performed by: FAMILY MEDICINE

## 2023-02-13 RX ORDER — OXYBUTYNIN CHLORIDE 5 MG/1
5 TABLET ORAL 2 TIMES DAILY
Qty: 60 TABLET | Refills: 2 | Status: SHIPPED | OUTPATIENT
Start: 2023-02-13 | End: 2023-02-14

## 2023-02-13 NOTE — PROGRESS NOTES
Patient ID: [id number]     Chief Complaint: Medicare AWV        HPI:     Tessa Dozier is a 82 y.o. female here today for a Medicare Wellness.   Well Adult History   The patient presents for well adult exam, The patient's general health status is described as good. The patient's diet is described as balanced. Exercise: occasional. Associated symptoms consist of none. Last menstrual period: patient no longer menstruates due to hysterectomy. Additional pertinent history: seat belt use, occasional caffeine use, tobacco use none, no alcohol use and She is fasting for annual labs. She is UTD on all vaccines. She is UTD on Colonoscopy with GI (Dr. Bonilla) on 2014. She does have hepatocellular carcinoma/esophageal varices, stable, followed by GI (Dr. Bonilla) and Hepatologist in [place] (Dr. Pierce). She is UTD on eye exam with Ophthalmology. She is UTD on Dental exam. Last MMG: UTD, she will have done in [place] and send our office the records). Last DEXA: 07/06/2017, at [facility], she needs printed order for DEXA scan. Hypothyroidism is controlled with Rx, no side effects. Anxiety is controlled with Rx, no side effects. She is seeing Cardiology at [facility] for heart health/atherosclerosis, asymptomatic. Pancytopenia is stable and asymptomatic, followed by Hepatologist.  - Patient complains of overactive bladder x several months, she refuses surgery, she is open to trial of Rx and pelvic floor exercises.   - Patient is without any other complaints today.    admits to Urinary leakage.  denies Recent falls or balance difficulty.   admits to Daily exercise or physical activity.  admits to Depression, stress, anxiety, or emotional lability, controlled with Rx Xanax p.r.n.    denies The need for healthcare treatment including a cane/walker, blood pressure monitoring, or regular vision/hearing tests.     A separate E/M code has been provided to evaluate additional complaints that the patient would like addressed during the  dedicated Medicare Wellness Exam.    Patient Care Team:  Angélica Dominguez MD as PCP - General (Family Medicine)  SOLO High MD as Consulting Physician (Otolaryngology)  Jaxon Pierce MD as Consulting Physician (Hepatology)     Opioid Screening: Patient medication list reviewed, patient is not taking prescription opioids. Patient is not using additional opioids than prescribed. Patient is at low risk of substance abuse based on this opioid use history.      Advance Care Planning     Date: 02/13/2023    Living Will  During this visit, I engaged the patient  in the advance care planning process.  The patient and I reviewed the role for advance directives and their purpose in directing future healthcare if the patient's unable to speak for him/herself.  At this point in time, the patient does have full decision-making capacity.  We discussed different extreme health states that she could experience, and reviewed what kind of medical care she would want in those situations.  The patient communicated that if she were comatose and had little chance of a meaningful recovery, she would not want machines/life-sustaining treatments used. In addition to the above preference, other important end-of-life issues for the patient include  Patient would like to be an organ donor . The patient has completed a living will to reflect these preferences.  I spent a total of 5 minutes engaging the patient in this advance care planning discussion.                No past medical history on file.     Past Surgical History:   Procedure Laterality Date    ear lobe surgery      EYE SURGERY  2018       Review of patient's allergies indicates:  No Known Allergies    Outpatient Medications Marked as Taking for the 2/13/23 encounter (Office Visit) with Angélica Dominguez MD   Medication Sig Dispense Refill    ALPRAZolam (XANAX) 2 MG Tab TAKE 1/2 TABLET BY MOUTH EVERY 12 HOURS 30 tablet 2    carvediloL (COREG) 3.125 MG  tablet TAKE 1 TABLET BY MOUTH TWICE A  tablet 3    levothyroxine (SYNTHROID) 75 MCG tablet TAKE 1 TABLET BY MOUTH ON MONDAY, WEDNESDAY,FRIDAY,SATURDAY, AND SUNDAY 30 tablet 11       Social History     Socioeconomic History    Marital status:    Tobacco Use    Smoking status: Never     Passive exposure: Never    Smokeless tobacco: Never   Substance and Sexual Activity    Alcohol use: Never    Drug use: Never    Sexual activity: Not Currently        History reviewed. No pertinent family history.     Subjective:     ROS      See HPI for details    Constitutional: No fever, No chills, No fatigue.   Eye: No blurring, No visual disturbances.   Ear/Nose/Mouth/Throat: No decreased hearing, No ear pain, No nasal congestion, No sore throat.   Respiratory: No shortness of breath, No cough, No wheezing.   Cardiovascular: No chest pain, No palpitations, No peripheral edema.   Breast: Both breasts, No lump/ mass, No pain.   Nipple discharge: None.   Gastrointestinal: No nausea, No vomiting, No diarrhea, No constipation, No abdominal pain.   Genitourinary: No dysuria, No hematuria.   Gynecologic: Negative except as documented in history of present illness.   Hematology/Lymphatics: No bruising tendency, No bleeding tendency, No swollen lymph glands.   Endocrine: No excessive thirst, No polyuria, No excessive hunger.   Musculoskeletal: No joint pain, No muscle pain, No decreased range of motion.   Integumentary: No rash, No pruritus.   Neurologic: No abnormal balance, No confusion, No headache.   Psychiatric: No anxiety, No depression, Not suicidal, No hallucinations.     All Other ROS: Negative except as stated in HPI.       Objective:     /70 (BP Location: Right arm, Patient Position: Sitting, BP Method: Medium (Automatic))   Pulse 70   Wt 48.5 kg (107 lb)   SpO2 97%   BMI 18.95 kg/m²     Physical Exam    General: Alert and oriented, No acute distress.   Eye: Pupils are equal, round and reactive to light,  Extraocular movements are intact, Normal conjunctiva.   HENT: Normocephalic, Tympanic membranes are clear, Normal hearing, Oral mucosa is moist, No pharyngeal erythema.   Throat: Pharynx ( Not edematous, No exudate ).   Neck: Supple, Non-tender, No carotid bruit, No lymphadenopathy, No thyromegaly.   Respiratory: Lungs are clear to auscultation, Respirations are non-labored, Breath sounds are equal, Symmetrical chest wall expansion, No chest wall tenderness.   Cardiovascular: Normal rate, Regular rhythm, No murmur, Good pulses equal in all extremities, No edema.   Gastrointestinal: Soft, Non-tender, Non-distended, Normal bowel sounds, No organomegaly.   Genitourinary: No costovertebral angle tenderness.   Musculoskeletal: Normal range of motion, No tenderness, Normal gait.   Neurologic: No focal deficits, Cranial Nerves II-XII are grossly intact.   Psychiatric: Cooperative, Appropriate mood & affect, Normal judgment, Non-suicidal.   Mood and affect: Calm.   Behavior: Relaxed.       Assessment:       ICD-10-CM ICD-9-CM   1. Medicare annual wellness visit, subsequent  Z00.00 V70.0   2. Hepatocellular carcinoma  C22.0 155.0   3. Esophageal varices without bleeding, unspecified esophageal varices type  I85.00 456.1   4. Generalized anxiety disorder  F41.1 300.02   5. Visit for screening mammogram  Z12.31 V76.12   6. Postmenopausal  Z78.0 V49.81   7. Screening for osteoporosis  Z13.820 V82.81   8. Overactive bladder  N32.81 596.51   9. Hypothyroidism, unspecified type  E03.9 244.9   10. Other pancytopenia  D61.818 284.19   11. Spinal muscular atrophy, unspecified  G12.9 335.10   12. Atherosclerosis of aorta  I70.0 440.0   13. Abnormal finding of blood chemistry, unspecified  R79.9 790.6        Plan:       Medicare Annual Wellness and Personalized Prevention Plan:     Fall Risk + Home Safety + Hearing Impairment + Depression Screen + Cognitive Impairment Screen + Health Risk Assessment all reviewed.     No flowsheet data  found.  Depression Screeening PHQ2 2/13/2023 11/2/2022   Over the last two weeks how often have you been bothered by little interest or pleasure in doing things 0 0   Over the last two weeks how often have you been bothered by feeling down, depressed or hopeless 0 0   PHQ-2 Total Score 0 0     Fall Risk Assessment - Outpatient 2/13/2023 11/2/2022   Mobility Status Ambulatory Ambulatory   Number of falls 0 0   Identified as fall risk 0 0             What is your age?: 80 or older  Are you male or female?: Female  During the past four weeks, how much have you been bothered by emotional problems such as feeling anxious, depressed, irritable, sad, or downhearted and blue?: Not at all  During the past five weeks, has your physical and/or emotional health limited your social activities with family, friends, neighbors, or groups?: Not at all  During the past four weeks, how much bodily pain have you generally had?: No pain  During the past four weeks, was someone available to help if you needed and wanted help?: Yes, as much as I wanted  During the past four weeks, what was the hardest physical activity you could do for at least two minutes?: Very light  Can you get to places out of walking distance without help?  (For example, can you travel alone on buses or taxis, or drive your own car?): No  Can you go shopping for groceries or clothes without someone's help?: No  Can you prepare your own meals?: Yes  Can you do your own housework without help?: No  Because of any health problems, do you need the help of another person with your personal care needs such as eating, bathing, dressing, or getting around the house?: No  Can you handle your own money without help?: No  During the past four weeks, how would you rate your health in general?: Fair  How have things been going for you during the past four weeks?: Good and bad parts about equal  Are you having difficulties driving your car?: Not applicable, I do not use a car  Do  you always fasten your seat belt when you are in a car?: Yes, usually  How often in the past four weeks have you been bothered by falling or dizzy when standing up?: Seldom  How often in the past four weeks have you been bothered by sexual problems?: Never  How often in the past four weeks have you been bothered by trouble eating well?: Never  How often in the past four weeks have you been bothered by teeth or denture problems?: Never  How often in the past four weeks have you been bothered with problems using the telephone?: Never  How often in the past four weeks have you been bothered by tiredness or fatigue?: Never  Have you fallen two or more times in the past year?: No  Are you afraid of falling?: Yes  Are you a smoker?: No  During the past four weeks, how many drinks of wine, beer, or other alcoholic beverages did you have?: No alcohol at all  Do you exercise for about 20 minutes three or more days a week?: No, I usually do not exercise this much  Have you been given any information to help you with hazards in your house that might hurt you?: Yes  Have you been given any information to help you with keeping track of your medications?: Yes  How often do you have trouble taking medicines the way you've been told to take them?: I always take them as prescribed  How confident are you that you can control and manage most of your health problems?: Very confident  What is your race? (Check all that apply.):                  Alcohol/Tobacco Use - Stressed importance of smoking cessation and limiting alcohol intake.  CVD Risk Factors - Reviewed  Obesity/Physical Activity - Normal BMI. Encouraged daily 30 minute physical activity x 5 days per week.      Health Maintenance Topics with due status: Not Due       Topic Last Completion Date    Lipid Panel 12/15/2020    DEXA Scan 01/29/2021        Vaccinations -   Immunization History   Administered Date(s) Administered    Influenza - Quadrivalent - PF *Preferred*  (6 months and older) 01/06/2016          1. Medicare annual wellness visit, subsequent  - Comprehensive Metabolic Panel; Future  - Hemoglobin A1C; Future  - Lipid Panel; Future  - Urinalysis, Reflex to Urine Culture; Future  - Will treat pending lab results. Monthly breast self exam encouraged. Continue healthy diet and exercise plan. Keep appointment for dental exams x q6 months as scheduled. Keep appointment for annual eye exam as scheduled. Keep appointment with specialists as scheduled. Notify M.D. or ER if temp greater than 100.4, or any acute illness.      2. Hepatocellular carcinoma  - CBC Auto Differential; Future  - Continue followup with Hematologist as scheduled.     3. Esophageal varices without bleeding, unspecified esophageal varices type  - Continue followup with Hematologist and Gastro as scheduled.     4. Generalized anxiety disorder  - Continue Xanax pr.n. with close monitoring.  reviewed today. Continue relaxation techniques. Will titrate medication as needed/tolerated. Notify M.D. or ER if symptoms persist or worsen, SI/HI, temp greater than 100.4, or any acute illness.    Start   /  Continue   Practice deep breathing or abdominal breathing exercises when anxiety occurs.  Exercise daily. Get sunlight daily.  Avoid caffeine, alcohol and stimulants.  Practice positive phrases and repeat throughout the day, yoga, lavender scents or Chamomile tea will help anxiety.  Set healthy boundaries, avoid people and conversations that increase stress.  Reports any symptoms of suicidal or homicidal ideations immediately, if clinic is closed go to nearest emergency room.     5. Visit for screening mammogram  - Mammo Digital Screening Bilat w/ Taiwo; Future  - Mammo Digital Screening Bilat w/ Taiwo    6. Postmenopausal  - DXA Bone Density Spine And Hip; Future  - DXA Bone Density Spine And Hip    7. Screening for osteoporosis  - DXA Bone Density Spine And Hip; Future  - DXA Bone Density Spine And Hip    8.  Overactive bladder  - Ambulatory referral/consult to Physical/Occupational Therapy; Future for pelvic floor training  - Urinalysis, Reflex to Urine Culture; Future  - Rx trial of oxybutynin (DITROPAN) 5 MG Tab; Take 1 tablet (5 mg total) by mouth 2 (two) times daily.  Dispense: 60 tablet; Refill: 2    9. Hypothyroidism, unspecified type  - TSH; Future  - T4, Free; Future  - Will titrate Rx pending results.     10. Other pancytopenia  - CBC Auto Differential; Future  - Will treat pending results.     11. Spinal muscular atrophy, unspecified  - Continue to monitor.     12. Atherosclerosis of aorta  - Continue followup with Cardiology as scheduled.     13. Abnormal finding of blood chemistry, unspecified  - Hemoglobin A1C; Future        Medication List with Changes/Refills   New Medications    OXYBUTYNIN (DITROPAN) 5 MG TAB    Take 1 tablet (5 mg total) by mouth 2 (two) times daily.       Start Date: 2/13/2023 End Date: 5/14/2023   Current Medications    ALPRAZOLAM (XANAX) 2 MG TAB    TAKE 1/2 TABLET BY MOUTH EVERY 12 HOURS       Start Date: 1/9/2023  End Date: --    CARVEDILOL (COREG) 3.125 MG TABLET    TAKE 1 TABLET BY MOUTH TWICE A DAY       Start Date: 1/9/2023  End Date: --    LEVOTHYROXINE (SYNTHROID) 75 MCG TABLET    TAKE 1 TABLET BY MOUTH ON MONDAY, WEDNESDAY,FRIDAY,SATURDAY, AND SUNDAY       Start Date: 9/1/2022  End Date: --   Discontinued Medications    FESOTERODINE (TOVIAZ) 4 MG TB24    Take 1 tablet (4 mg total) by mouth once daily. for 15 days       Start Date: 11/2/2022 End Date: 2/13/2023          The patient's Health Maintenance was reviewed and the following appears to be due at this time:   There are no preventive care reminders to display for this patient.        Follow up in about 3 months (around 5/13/2023) for [facility] followup- 30 minute appointment.

## 2023-03-02 DIAGNOSIS — N32.81 OVERACTIVE BLADDER: ICD-10-CM

## 2023-03-02 RX ORDER — OXYBUTYNIN CHLORIDE 5 MG/1
5 TABLET ORAL 2 TIMES DAILY
Qty: 180 TABLET | Refills: 0 | Status: SHIPPED | OUTPATIENT
Start: 2023-03-02 | End: 2023-06-07 | Stop reason: SINTOL

## 2023-03-02 NOTE — TELEPHONE ENCOUNTER
Patient called wanting a referral to ENT specialist Dr. Ronen Bonilla. Please advise.    Also, Patient called needing refill on medications. Thanks!

## 2023-03-13 ENCOUNTER — TELEPHONE (OUTPATIENT)
Dept: FAMILY MEDICINE | Facility: CLINIC | Age: 83
End: 2023-03-13
Payer: MEDICARE

## 2023-03-13 NOTE — TELEPHONE ENCOUNTER
----- Message from Alfie Thompson sent at 3/13/2023 10:42 AM CDT -----  .Type:  Needs Medical Advice    Who Called: Tessa  Symptoms (please be specific):    How long has patient had these symptoms:    Pharmacy name and phone #:    Would the patient rather a call back or a response via [facility]?   Best Call Back Number: [phone]  Additional Information: She requested to speak with the nurse re: medication question and the reaction made her dizzy.

## 2023-03-13 NOTE — TELEPHONE ENCOUNTER
----- Message from Manju Kraft sent at 3/13/2023 11:10 AM CDT -----  .Type:  Needs Medical Advice    Who Called: pt  Symptoms (please be specific):    How long has patient had these symptoms:    Pharmacy name and phone #:    Would the patient rather a call back or a response via [facility]? C/b  Best Call Back Number: [phone]  Additional Information:  please call the pt back I called the back line but it was busy

## 2023-03-13 NOTE — TELEPHONE ENCOUNTER
Pt voiced her medication Oxybutynin makes her very dizzy and pt has stopped taking it. She recommended if she can take Fesoterodine (Toviaz) instead?    Please advise, thanks!

## 2023-03-14 ENCOUNTER — TELEPHONE (OUTPATIENT)
Dept: FAMILY MEDICINE | Facility: CLINIC | Age: 83
End: 2023-03-14
Payer: MEDICARE

## 2023-03-14 DIAGNOSIS — N32.81 OVERACTIVE BLADDER: Primary | ICD-10-CM

## 2023-03-14 NOTE — TELEPHONE ENCOUNTER
----- Message from Carlos Manuel Lan sent at 3/14/2023  9:57 AM CDT -----  .Type:  Patient Requesting Referral    Who Called:pt  Does the patient already have the specialty appointment scheduled?:  If yes, what is the date of that appointment?:  Referral to What Specialty:Urology  Reason for Referral:  Does the patient want the referral with a specific physician?:Dr. Resendez  Is the specialist an [facility]?:  Patient Requesting a Response?:  Would the patient rather a call back or a response via [facility]? Call back  Best Call Back Number:[phone]  Additional Information:

## 2023-03-14 NOTE — TELEPHONE ENCOUNTER
----- Message from Carlos Manuel Lan sent at 3/13/2023  3:32 PM CDT -----  .Type:  Patient Returning Call    Who Called:pt  Who Left Message for Patient: Rosalba  Does the patient know what this is regarding?:  Would the patient rather a call back or a response via [facility]? Call back  Best Call Back Number:[phone]  Additional Information: attempted back line no answer

## 2023-03-14 NOTE — TELEPHONE ENCOUNTER
Decline the new referral. I apologize. Pt changed her mind and now wants to be seen by Dr. Kirk. Thanks!

## 2023-04-10 DIAGNOSIS — F41.1 GENERALIZED ANXIETY DISORDER: ICD-10-CM

## 2023-04-11 RX ORDER — ALPRAZOLAM 2 MG/1
TABLET ORAL
Qty: 30 TABLET | Refills: 2 | Status: SHIPPED | OUTPATIENT
Start: 2023-04-11 | End: 2023-06-07 | Stop reason: SDUPTHER

## 2023-06-07 ENCOUNTER — OFFICE VISIT (OUTPATIENT)
Dept: FAMILY MEDICINE | Facility: CLINIC | Age: 83
End: 2023-06-07
Payer: MEDICARE

## 2023-06-07 VITALS
OXYGEN SATURATION: 98 % | DIASTOLIC BLOOD PRESSURE: 70 MMHG | SYSTOLIC BLOOD PRESSURE: 128 MMHG | HEART RATE: 77 BPM | WEIGHT: 110.69 LBS | BODY MASS INDEX: 19.61 KG/M2

## 2023-06-07 DIAGNOSIS — N32.81 OVERACTIVE BLADDER: Primary | ICD-10-CM

## 2023-06-07 DIAGNOSIS — D70.9 NEUTROPENIA, UNSPECIFIED TYPE: Primary | ICD-10-CM

## 2023-06-07 DIAGNOSIS — Z00.00 MEDICARE ANNUAL WELLNESS VISIT, SUBSEQUENT: ICD-10-CM

## 2023-06-07 DIAGNOSIS — F41.1 GENERALIZED ANXIETY DISORDER: ICD-10-CM

## 2023-06-07 DIAGNOSIS — D69.6 THROMBOCYTOPENIA: ICD-10-CM

## 2023-06-07 LAB
APPEARANCE UR: ABNORMAL
BACTERIA #/AREA URNS AUTO: ABNORMAL /HPF
BILIRUB UR QL STRIP.AUTO: NEGATIVE MG/DL
COLOR UR: YELLOW
GLUCOSE UR QL STRIP.AUTO: NEGATIVE MG/DL
KETONES UR QL STRIP.AUTO: NEGATIVE MG/DL
LEUKOCYTE ESTERASE UR QL STRIP.AUTO: ABNORMAL UNIT/L
NITRITE UR QL STRIP.AUTO: NEGATIVE
PH UR STRIP.AUTO: 7.5 [PH]
PROT UR QL STRIP.AUTO: NEGATIVE MG/DL
RBC #/AREA URNS AUTO: 56 /HPF
RBC UR QL AUTO: ABNORMAL UNIT/L
SP GR UR STRIP.AUTO: 1.02 (ref 1–1.03)
SQUAMOUS #/AREA URNS AUTO: <5 /HPF
UROBILINOGEN UR STRIP-ACNC: 1 MG/DL
WBC #/AREA URNS AUTO: 13 /HPF

## 2023-06-07 PROCEDURE — 84439 ASSAY OF FREE THYROXINE: CPT | Performed by: FAMILY MEDICINE

## 2023-06-07 PROCEDURE — 99214 PR OFFICE/OUTPT VISIT, EST, LEVL IV, 30-39 MIN: ICD-10-PCS | Mod: ,,, | Performed by: FAMILY MEDICINE

## 2023-06-07 PROCEDURE — 99214 OFFICE O/P EST MOD 30 MIN: CPT | Mod: ,,, | Performed by: FAMILY MEDICINE

## 2023-06-07 PROCEDURE — 83036 HEMOGLOBIN GLYCOSYLATED A1C: CPT | Performed by: FAMILY MEDICINE

## 2023-06-07 PROCEDURE — 80053 COMPREHEN METABOLIC PANEL: CPT | Performed by: FAMILY MEDICINE

## 2023-06-07 PROCEDURE — 84443 ASSAY THYROID STIM HORMONE: CPT | Performed by: FAMILY MEDICINE

## 2023-06-07 PROCEDURE — 85025 COMPLETE CBC W/AUTO DIFF WBC: CPT | Performed by: FAMILY MEDICINE

## 2023-06-07 PROCEDURE — 80061 LIPID PANEL: CPT | Performed by: FAMILY MEDICINE

## 2023-06-07 RX ORDER — PRAVASTATIN SODIUM 40 MG/1
40 TABLET ORAL DAILY
COMMUNITY
Start: 2023-04-08

## 2023-06-07 RX ORDER — ALPRAZOLAM 2 MG/1
2 TABLET ORAL
Qty: 30 TABLET | Refills: 2 | Status: SHIPPED | OUTPATIENT
Start: 2023-06-07 | End: 2023-06-13 | Stop reason: SDUPTHER

## 2023-06-07 NOTE — PROGRESS NOTES
Patient ID: [id number]     Chief Complaint: Overactive bladder        HPI:     Tessa Dozier is a 82 y.o. female here today for a follow up.   - Patient complains of overactive bladder x several months, she refuses surgery, she is open to trial of Rx. She has tried pelvic floor exercises with PT without resolution of symptoms. She has tried Ditropan, but it made her too drowsy, she is requesting trial of Gemtesa. She has an appointment with Urology (Dr. Win) scheduled for 08/23/2023.   - Anxiety is controlled with Rx, no side effects, she needs a refill of Rx Xanax today.   - She had wellness labs ordered on 02/13/2023, never had done, would like to do today.  - Patient is without any other complaints today.            No past medical history on file.     Past Surgical History:   Procedure Laterality Date    ear lobe surgery      EYE SURGERY  2018       Review of patient's allergies indicates:  No Known Allergies    Outpatient Medications Marked as Taking for the 6/7/23 encounter (Office Visit) with Angélica Dominguez MD   Medication Sig Dispense Refill    carvediloL (COREG) 3.125 MG tablet TAKE 1 TABLET BY MOUTH TWICE A  tablet 3    levothyroxine (SYNTHROID) 50 MCG tablet TAKE 1 TABLET BY MOUTH EVERY TUESDAY AND THURSDAY 30 tablet 11    levothyroxine (SYNTHROID) 75 MCG tablet TAKE 1 TABLET BY MOUTH ON MONDAY, WEDNESDAY,FRIDAY,SATURDAY, AND SUNDAY 30 tablet 11    pravastatin (PRAVACHOL) 40 MG tablet Take 40 mg by mouth once daily.      [DISCONTINUED] ALPRAZolam (XANAX) 2 MG Tab TAKE 1/2 TABLET BY MOUTH EVERY 12 HOURS 30 tablet 2       Social History     Socioeconomic History    Marital status:    Tobacco Use    Smoking status: Never     Passive exposure: Never    Smokeless tobacco: Never   Substance and Sexual Activity    Alcohol use: Never    Drug use: Never    Sexual activity: Not Currently        History reviewed. No pertinent family history.     Subjective:       Review of  Systems:    See HPI for details    Constitutional: Denies Change in appetite. Denies Chills. Denies Fever. Denies Night sweats.  Eye: Denies Blurred vision. Denies Discharge. Denies Eye pain.  ENT: Denies Decreased hearing. Denies Sore throat. Denies Swollen glands.  Respiratory: Denies Cough. Denies Shortness of breath. Denies Shortness of breath with exertion. Denies Wheezing.  Cardiovascular: Denies Chest pain at rest. Denies Chest pain with exertion. Denies Irregular heartbeat. Denies Palpitations.  Gastrointestinal: Denies Abdominal pain. Denies Diarrhea. Denies Nausea. Denies Vomiting. Denies Hematemesis or Hematochezia.  Genitourinary: As per HPI. Denies Dysuria. Denies Urinary frequency. Denies Urinary urgency. Denies Blood in urine.  Endocrine: Denies Cold intolerance. Denies Excessive thirst. Denies Heat intolerance. Denies Weight loss. Denies Weight gain.  Musculoskeletal: Denies Painful joints. Denies Weakness.  Integumentary: Denies Rash. Denies Itching. Denies Dry skin.  Neurologic: Denies Dizziness. Denies Fainting. Denies Headache.  Psychiatric: Denies Depression. Denies Anxiety. Denies Suicidal/Homicidal ideations.    All Other ROS: Negative except as stated in HPI.       Objective:     /70 (BP Location: Right arm, Patient Position: Sitting, BP Method: Medium (Automatic))   Pulse 77   Wt 50.2 kg (110 lb 11.2 oz)   SpO2 98%   BMI 19.61 kg/m²     Physical Exam    General: Alert and oriented, No acute distress.  Head: Normocephalic, Atraumatic.  Eye: Pupils are equal, round and reactive to light, Extraocular movements are intact, Sclera non-icteric.  Ears/Nose/Throat: Normal, Mucosa moist,Clear.  Neck/Thyroid: Supple, Non-tender, No carotid bruit, No palpable thyromegaly or thyroid nodule, No lymphadenopathy, No JVD, Full range of motion.  Respiratory: Clear to auscultation bilaterally; No wheezes, rales or rhonchi,Non-labored respirations, Symmetrical chest wall expansion.  Cardiovascular:  Regular rate and rhythm, S1/S2 normal, No murmurs, rubs or gallops.  Gastrointestinal: Soft, Non-tender, Non-distended, Normal bowel sounds, No palpable organomegaly.  Musculoskeletal: Normal range of motion.  Integumentary: Warm, Dry, Intact, No suspicious lesions or rashes.  Extremities: No clubbing, cyanosis or edema  Neurologic: No focal deficits, Cranial Nerves II-XII are grossly intact, Motor strength normal upper and lower extremities, Sensory exam intact.  Psychiatric: Normal interaction, Coherent speech, Euthymic mood, Appropriate affect         Assessment:       ICD-10-CM ICD-9-CM   1. Overactive bladder  N32.81 596.51   2. Generalized anxiety disorder  F41.1 300.02        Plan:     Problem List Items Addressed This Visit    None  Visit Diagnoses       Overactive bladder    -  Primary    Relevant Medications    vibegron 75 mg Tab    Generalized anxiety disorder        Relevant Medications    ALPRAZolam (XANAX) 2 MG Tab         1. Overactive bladder  - Rx trial of vibegron 75 mg Tab; Take 1 tablet by mouth once daily.  Dispense: 30 tablet; Refill: 2  - Pelvic floor exercises encouraged. Keep appointment with Urology as scheduled. Notify M.D. or ER if symptoms persist or worsen, hematuria, abdominal pain, vomiting, temp >100.4, or any acute illness.      2. Generalized anxiety disorder  - ALPRAZolam (XANAX) 2 MG Tab; Take 1 tablet (2 mg total) by mouth every 24 hours as needed (anxiety).  Dispense: 30 tablet; Refill: 2  - Continue Xanax pr.n. with close monitoring, Rx refilled today.  reviewed today. Continue relaxation techniques. Will titrate medication as needed/tolerated. Notify M.D. or ER if symptoms persist or worsen, SI/HI, temp greater than 100.4, or any acute illness.    Start   /  Continue   Practice deep breathing or abdominal breathing exercises when anxiety occurs.  Exercise daily. Get sunlight daily.  Avoid caffeine, alcohol and stimulants.  Practice positive phrases and repeat throughout  the day, yoga, lavender scents or Chamomile tea will help anxiety.  Set healthy boundaries, avoid people and conversations that increase stress.  Reports any symptoms of suicidal or homicidal ideations immediately, if clinic is closed go to nearest emergency room.       Tessa was seen today for overactive bladder.    Diagnoses and all orders for this visit:    Overactive bladder  -     vibegron 75 mg Tab; Take 1 tablet by mouth once daily.    Generalized anxiety disorder  -     ALPRAZolam (XANAX) 2 MG Tab; Take 1 tablet (2 mg total) by mouth every 24 hours as needed (anxiety).          Medication List with Changes/Refills   New Medications    VIBEGRON 75 MG TAB    Take 1 tablet by mouth once daily.       Start Date: 6/7/2023  End Date: 9/5/2023   Current Medications    CARVEDILOL (COREG) 3.125 MG TABLET    TAKE 1 TABLET BY MOUTH TWICE A DAY       Start Date: 1/9/2023  End Date: --    LEVOTHYROXINE (SYNTHROID) 50 MCG TABLET    TAKE 1 TABLET BY MOUTH EVERY TUESDAY AND THURSDAY       Start Date: 2/14/2023 End Date: --    LEVOTHYROXINE (SYNTHROID) 75 MCG TABLET    TAKE 1 TABLET BY MOUTH ON MONDAY, WEDNESDAY,FRIDAY,SATURDAY, AND SUNDAY       Start Date: 9/1/2022  End Date: --    PRAVASTATIN (PRAVACHOL) 40 MG TABLET    Take 40 mg by mouth once daily.       Start Date: 4/8/2023  End Date: --   Changed and/or Refilled Medications    Modified Medication Previous Medication    ALPRAZOLAM (XANAX) 2 MG TAB ALPRAZolam (XANAX) 2 MG Tab       Take 1 tablet (2 mg total) by mouth every 24 hours as needed (anxiety).    TAKE 1/2 TABLET BY MOUTH EVERY 12 HOURS       Start Date: 6/7/2023  End Date: --    Start Date: 4/11/2023 End Date: 6/7/2023   Discontinued Medications    OXYBUTYNIN (DITROPAN) 5 MG TAB    Take 1 tablet (5 mg total) by mouth 2 (two) times daily.       Start Date: 3/2/2023  End Date: 6/7/2023          Follow up in about 3 months (around 9/7/2023) for Anxiety followup, Virtual Visit.

## 2023-06-08 ENCOUNTER — TELEPHONE (OUTPATIENT)
Dept: FAMILY MEDICINE | Facility: CLINIC | Age: 83
End: 2023-06-08
Payer: MEDICARE

## 2023-06-08 DIAGNOSIS — D69.6 THROMBOCYTOPENIA: ICD-10-CM

## 2023-06-08 DIAGNOSIS — D70.9 NEUTROPENIA, UNSPECIFIED TYPE: ICD-10-CM

## 2023-06-08 NOTE — TELEPHONE ENCOUNTER
----- Message from Angélica Dominguez MD sent at 6/7/2023  4:56 PM CDT -----  She has white blood cells and red blood cells in urine, awaiting results of urine culture to determine if she has a urinary tract infection, may take 2-3 days for results of urine culture.

## 2023-06-08 NOTE — TELEPHONE ENCOUNTER
----- Message from Angélica Dominguez MD sent at 6/7/2023  5:02 PM CDT -----  Her white blood cell count and platelet count are low, this could be contributing to her fatigue, order to add CONCHA panel to lab is in file for evaluation of possible auto-immune disease, my office staff will contact the lab to add, she will also need to see a blood specialist (Hematologist) for further evaluation and treatment, referral is in file. Thyroid is well controlled and within normal range, continue thyroid medication as currently prescribed, recheck TSH, free T4 in 12/2023. Remaining labs are normal.

## 2023-06-09 LAB — BACTERIA UR CULT: NORMAL

## 2023-06-09 NOTE — TELEPHONE ENCOUNTER
----- Message from Angélica Dominguez MD sent at 6/9/2023  8:50 AM CDT -----  Final  urine culture is negative for bacterial urinary tract infection.

## 2023-06-13 DIAGNOSIS — F41.1 GENERALIZED ANXIETY DISORDER: ICD-10-CM

## 2023-06-13 RX ORDER — ALPRAZOLAM 2 MG/1
2 TABLET ORAL
Qty: 30 TABLET | Refills: 2 | Status: SHIPPED | OUTPATIENT
Start: 2023-06-13 | End: 2023-07-12 | Stop reason: SDUPTHER

## 2023-06-26 ENCOUNTER — OFFICE VISIT (OUTPATIENT)
Dept: HEMATOLOGY/ONCOLOGY | Facility: CLINIC | Age: 83
End: 2023-06-26
Payer: MEDICARE

## 2023-06-26 VITALS
TEMPERATURE: 98 F | DIASTOLIC BLOOD PRESSURE: 71 MMHG | OXYGEN SATURATION: 98 % | HEART RATE: 70 BPM | SYSTOLIC BLOOD PRESSURE: 121 MMHG | WEIGHT: 108.5 LBS | HEIGHT: 63 IN | BODY MASS INDEX: 19.22 KG/M2

## 2023-06-26 DIAGNOSIS — R16.1 SPLENOMEGALY: ICD-10-CM

## 2023-06-26 DIAGNOSIS — Z85.05 CHRONIC LIVER DISEASE IN PATIENT WITH HISTORY OF HEPATOCELLULAR CARCINOMA: ICD-10-CM

## 2023-06-26 DIAGNOSIS — D70.9 NEUTROPENIA, UNSPECIFIED TYPE: ICD-10-CM

## 2023-06-26 DIAGNOSIS — R42 DIZZINESS: Primary | ICD-10-CM

## 2023-06-26 DIAGNOSIS — K76.9 CHRONIC LIVER DISEASE IN PATIENT WITH HISTORY OF HEPATOCELLULAR CARCINOMA: ICD-10-CM

## 2023-06-26 DIAGNOSIS — R26.81 UNSTEADY GAIT: ICD-10-CM

## 2023-06-26 DIAGNOSIS — D69.6 THROMBOCYTOPENIA: ICD-10-CM

## 2023-06-26 LAB
ALBUMIN SERPL-MCNC: 3.3 G/DL (ref 3.4–4.8)
ALBUMIN/GLOB SERPL: 0.9 RATIO (ref 1.1–2)
ALP SERPL-CCNC: 78 UNIT/L (ref 40–150)
ALT SERPL-CCNC: 24 UNIT/L (ref 0–55)
AST SERPL-CCNC: 41 UNIT/L (ref 5–34)
BASOPHILS # BLD AUTO: 0.03 X10(3)/MCL
BASOPHILS NFR BLD AUTO: 0.8 %
BILIRUBIN DIRECT+TOT PNL SERPL-MCNC: 2.8 MG/DL
BUN SERPL-MCNC: 14.9 MG/DL (ref 9.8–20.1)
CALCIUM SERPL-MCNC: 8.8 MG/DL (ref 8.4–10.2)
CHLORIDE SERPL-SCNC: 109 MMOL/L (ref 98–107)
CO2 SERPL-SCNC: 24 MMOL/L (ref 23–31)
CREAT SERPL-MCNC: 0.77 MG/DL (ref 0.55–1.02)
EOSINOPHIL # BLD AUTO: 0.08 X10(3)/MCL (ref 0–0.9)
EOSINOPHIL NFR BLD AUTO: 2.2 %
ERYTHROCYTE [DISTWIDTH] IN BLOOD BY AUTOMATED COUNT: 17.1 % (ref 11.5–17)
FERRITIN SERPL-MCNC: 43.3 NG/ML (ref 4.63–204)
FOLATE SERPL-MCNC: 15.3 NG/ML (ref 7–31.4)
GFR SERPLBLD CREATININE-BSD FMLA CKD-EPI: >60 MLS/MIN/1.73/M2
GLOBULIN SER-MCNC: 3.5 GM/DL (ref 2.4–3.5)
GLUCOSE SERPL-MCNC: 108 MG/DL (ref 82–115)
HCT VFR BLD AUTO: 40.5 % (ref 37–47)
HGB BLD-MCNC: 13.1 G/DL (ref 12–16)
IGA SERPL-MCNC: 337 MG/DL (ref 69–517)
IGG SERPL-MCNC: 2173 MG/DL (ref 522–1631)
IGM SERPL-MCNC: 48 MG/DL (ref 33–293)
IMM GRANULOCYTES # BLD AUTO: 0 X10(3)/MCL (ref 0–0.04)
IMM GRANULOCYTES NFR BLD AUTO: 0 %
IRON SATN MFR SERPL: 43 % (ref 20–50)
IRON SERPL-MCNC: 122 UG/DL (ref 50–170)
LYMPHOCYTES # BLD AUTO: 0.75 X10(3)/MCL (ref 0.6–4.6)
LYMPHOCYTES NFR BLD AUTO: 20.2 %
MCH RBC QN AUTO: 30.8 PG (ref 27–31)
MCHC RBC AUTO-ENTMCNC: 32.3 G/DL (ref 33–36)
MCV RBC AUTO: 95.1 FL (ref 80–94)
MONOCYTES # BLD AUTO: 0.35 X10(3)/MCL (ref 0.1–1.3)
MONOCYTES NFR BLD AUTO: 9.4 %
NEUTROPHILS # BLD AUTO: 2.5 X10(3)/MCL (ref 2.1–9.2)
NEUTROPHILS NFR BLD AUTO: 67.4 %
PLATELET # BLD AUTO: 65 X10(3)/MCL (ref 130–400)
PMV BLD AUTO: 10.8 FL (ref 7.4–10.4)
POTASSIUM SERPL-SCNC: 3.8 MMOL/L (ref 3.5–5.1)
PROT SERPL-MCNC: 6.8 GM/DL (ref 5.8–7.6)
RBC # BLD AUTO: 4.26 X10(6)/MCL (ref 4.2–5.4)
RHEUMATOID FACT SERPL-ACNC: <13 IU/ML
SODIUM SERPL-SCNC: 139 MMOL/L (ref 136–145)
TIBC SERPL-MCNC: 163 UG/DL (ref 70–310)
TIBC SERPL-MCNC: 285 UG/DL (ref 250–450)
TRANSFERRIN SERPL-MCNC: 248 MG/DL
VIT B12 SERPL-MCNC: 1241 PG/ML (ref 213–816)
WBC # SPEC AUTO: 3.71 X10(3)/MCL (ref 4.5–11.5)

## 2023-06-26 PROCEDURE — 84165 PROTEIN E-PHORESIS SERUM: CPT | Performed by: INTERNAL MEDICINE

## 2023-06-26 PROCEDURE — 99205 PR OFFICE/OUTPT VISIT, NEW, LEVL V, 60-74 MIN: ICD-10-PCS | Mod: S$PBB,,, | Performed by: INTERNAL MEDICINE

## 2023-06-26 PROCEDURE — 82784 ASSAY IGA/IGD/IGG/IGM EACH: CPT | Performed by: INTERNAL MEDICINE

## 2023-06-26 PROCEDURE — 86235 NUCLEAR ANTIGEN ANTIBODY: CPT | Mod: 59 | Performed by: INTERNAL MEDICINE

## 2023-06-26 PROCEDURE — 85025 COMPLETE CBC W/AUTO DIFF WBC: CPT | Performed by: INTERNAL MEDICINE

## 2023-06-26 PROCEDURE — 99999 PR PBB SHADOW E&M-EST. PATIENT-LVL IV: CPT | Mod: PBBFAC,,, | Performed by: INTERNAL MEDICINE

## 2023-06-26 PROCEDURE — 85060 BLOOD SMEAR INTERPRETATION: CPT | Performed by: INTERNAL MEDICINE

## 2023-06-26 PROCEDURE — 86225 DNA ANTIBODY NATIVE: CPT | Performed by: INTERNAL MEDICINE

## 2023-06-26 PROCEDURE — 82607 VITAMIN B-12: CPT | Performed by: INTERNAL MEDICINE

## 2023-06-26 PROCEDURE — 86431 RHEUMATOID FACTOR QUANT: CPT | Mod: 91 | Performed by: INTERNAL MEDICINE

## 2023-06-26 PROCEDURE — 82728 ASSAY OF FERRITIN: CPT | Performed by: INTERNAL MEDICINE

## 2023-06-26 PROCEDURE — 86334 IMMUNOFIX E-PHORESIS SERUM: CPT | Performed by: INTERNAL MEDICINE

## 2023-06-26 PROCEDURE — 82746 ASSAY OF FOLIC ACID SERUM: CPT | Performed by: INTERNAL MEDICINE

## 2023-06-26 PROCEDURE — 99214 OFFICE O/P EST MOD 30 MIN: CPT | Mod: PBBFAC | Performed by: INTERNAL MEDICINE

## 2023-06-26 PROCEDURE — 83550 IRON BINDING TEST: CPT | Performed by: INTERNAL MEDICINE

## 2023-06-26 PROCEDURE — 36415 COLL VENOUS BLD VENIPUNCTURE: CPT | Performed by: INTERNAL MEDICINE

## 2023-06-26 PROCEDURE — 80053 COMPREHEN METABOLIC PANEL: CPT | Performed by: INTERNAL MEDICINE

## 2023-06-26 PROCEDURE — 86431 RHEUMATOID FACTOR QUANT: CPT | Performed by: INTERNAL MEDICINE

## 2023-06-26 PROCEDURE — 99205 OFFICE O/P NEW HI 60 MIN: CPT | Mod: S$PBB,,, | Performed by: INTERNAL MEDICINE

## 2023-06-26 PROCEDURE — 99999 PR PBB SHADOW E&M-EST. PATIENT-LVL IV: ICD-10-PCS | Mod: PBBFAC,,, | Performed by: INTERNAL MEDICINE

## 2023-06-26 NOTE — PROGRESS NOTES
HEMATOLOGY/ONCOLOGY OFFICE CLINIC VISIT    Visit Information:    Initial Evaluation:  06/26/2023  Referring Provider:  Dr. Angélica Dominguez  Other providers:  Code status:  Not addressed    Diagnosis:  Chronic leukopenia/thrombocytopenia    Present treatment:    Treatment/Oncology history:    Plan of care:     Imaging:  CT AP 7/30/2019: 1. Abnormal appearance to the liver parenchyma, what appears to be  segment 4,  with diffuse heterogeneous hyperemia of the liver  parenchyma, adjacent to areas of low mixed attenuation, but without  discrete enhancing in this geographic region. Separate from this area  is a 12 mm low-attenuation focus within the caudate lobe of the liver,  stable in appearance the previous examination. The changes of the  hepatic parenchyma may simply reflect underlying portal hypertension,  or possibly previous hepatic infarct. Underlying mass lesion not  excluded. Continued Surveillance exams recommended.     2. Changes of portal hypertension, esophageal, and gastric varices.  3. Splenomegaly.  4. Postoperative changes in the epigastrium presumably from a previous gastroduodenal artery embolization.   5. Colonic diverticulosis, uncomplicated.    Pathology:      CLINICAL HISTORY:       Patient: Tessa Dozier is a 82 y.o. female kindly referred for neutropenia and thrombocytopenia.  Patient is not a good historian and most of the history is taken by her     Reviewing electronic medical record patient with leukopenia and thrombocytopenia since July of 2012.  Leukopenia has been relatively stable but platelets decreasing over time.  Most recent platelet count 70.      Patient also with history of liver disease with portal hypertension, esophageal and gastric varices and splenomegaly.  She has history of hepatitis-C for which she was treated. Patient also with history of hepatocellular carcinoma for which she received taste and 2 and radiation.  I do not have records of this. She was  "[facility] at [facility]    She denies any fever, chills, sweats.  No chest pain or short of breath.  She has fall on her "butt"  x2 and her main complaint is pain in that area.  She did not went to the emergency department.  She looks frail and debilitated.  Gait unsteady and dysarthria.   reports that this on balance and speech has been going on for about a year now. Hepatologist recommend them to see a neurologist.        Chief Complaint: OTHER (NP referred by DR. Angélica Dominguez for Neutropenia, Thrombocytopenia.)      Interval History:        History reviewed. No pertinent past medical history.   Past Surgical History:   Procedure Laterality Date    ear lobe surgery      EYE SURGERY  2018     History reviewed. No pertinent family history.  Social Connections: Not on file       Review of patient's allergies indicates:  No Known Allergies   Current Outpatient Medications on File Prior to Visit   Medication Sig Dispense Refill    ALPRAZolam (XANAX) 2 MG Tab Take 1 tablet (2 mg total) by mouth every 24 hours as needed (anxiety). 30 tablet 2    carvediloL (COREG) 3.125 MG tablet TAKE 1 TABLET BY MOUTH TWICE A  tablet 3    levothyroxine (SYNTHROID) 50 MCG tablet TAKE 1 TABLET BY MOUTH EVERY TUESDAY AND THURSDAY 30 tablet 11    levothyroxine (SYNTHROID) 75 MCG tablet TAKE 1 TABLET BY MOUTH ON MONDAY, WEDNESDAY,FRIDAY,SATURDAY, AND SUNDAY 30 tablet 11    pravastatin (PRAVACHOL) 40 MG tablet Take 40 mg by mouth once daily.      vibegron 75 mg Tab Take 1 tablet by mouth once daily. 30 tablet 2     No current facility-administered medications on file prior to visit.      Review of Systems   Constitutional:  Positive for activity change and fatigue. Negative for appetite change, chills, fever and unexpected weight change.   HENT:  Negative for mouth dryness, mouth sores, nosebleeds, sore throat and trouble swallowing.    Eyes:  Negative for visual disturbance.   Respiratory:  Negative for cough and shortness of " "breath.    Cardiovascular:  Negative for chest pain, palpitations and leg swelling.   Gastrointestinal:  Negative for abdominal distention, abdominal pain, blood in stool, change in bowel habit, constipation, diarrhea, nausea, vomiting and change in bowel habit.   Endocrine: Negative.    Genitourinary:  Negative for dysuria, frequency, hematuria and urgency.   Musculoskeletal:  Negative for arthralgias, back pain, myalgias and neck pain.   Integumentary:  Negative for rash.   Neurological:  Positive for dizziness, speech difficulty, weakness and light-headedness. Negative for tremors, syncope, numbness, headaches and memory loss.   Hematological:  Does not bruise/bleed easily.   Psychiatric/Behavioral:  Negative for confusion and suicidal ideas.             Vitals:    06/26/23 1320   BP: 121/71   BP Location: Left arm   Patient Position: Sitting   Pulse: 70   Temp: 97.6 °F (36.4 °C)   TempSrc: Oral   SpO2: 98%   Weight: 49.2 kg (108 lb 8 oz)   Height: 5' 3" (1.6 m)      Wt Readings from Last 6 Encounters:   06/26/23 49.2 kg (108 lb 8 oz)   06/07/23 50.2 kg (110 lb 11.2 oz)   02/13/23 48.5 kg (107 lb)   11/02/22 48 kg (105 lb 12.8 oz)   10/12/21 51 kg (112 lb 5.2 oz)     Body mass index is 19.22 kg/m².  Body surface area is 1.48 meters squared.  Physical Exam  Vitals and nursing note reviewed.   Constitutional:       General: She is not in acute distress.     Appearance: She is ill-appearing (chronically).      Comments: Looks frail and debilitated.   HENT:      Head: Normocephalic and atraumatic.      Mouth/Throat:      Comments: Mild swollen lips with resolving hematoma lower lip  Eyes:      General: No scleral icterus.     Extraocular Movements: Extraocular movements intact.      Conjunctiva/sclera: Conjunctivae normal.      Pupils: Pupils are equal, round, and reactive to light.   Neck:      Vascular: No JVD.   Cardiovascular:      Rate and Rhythm: Normal rate and regular rhythm.   Pulmonary:      Effort: " Pulmonary effort is normal.      Breath sounds: Normal breath sounds.   Abdominal:      General: Bowel sounds are normal. There is no distension.      Palpations: Abdomen is soft. There is no mass.      Tenderness: There is no abdominal tenderness.   Musculoskeletal:         General: No swelling or deformity.      Cervical back: Neck supple.   Lymphadenopathy:      Head:      Right side of head: No submandibular adenopathy.      Left side of head: No submandibular adenopathy.      Cervical: No cervical adenopathy.      Upper Body:      Right upper body: No supraclavicular or axillary adenopathy.      Left upper body: No supraclavicular or axillary adenopathy.      Lower Body: No right inguinal adenopathy. No left inguinal adenopathy.   Skin:     General: Skin is warm.      Coloration: Skin is not jaundiced.      Findings: No lesion or rash.      Nails: There is no clubbing.   Neurological:      Mental Status: She is alert.      Cranial Nerves: Dysarthria present.      Motor: Weakness present.      Gait: Gait abnormal.   Psychiatric:         Attention and Perception: She is inattentive.         Mood and Affect: Affect is flat.         Behavior: Behavior is slowed.     ECOG SCORE    3 - Capable of only limited selfcare, confined to bed or chair more than 50% of waking hours         Laboratory:  CBC with Differential:  Lab Results   Component Value Date    WBC 3.71 (L) 06/26/2023    RBC 4.26 06/26/2023    HGB 13.1 06/26/2023    HCT 40.5 06/26/2023    MCV 95.1 (H) 06/26/2023    MCH 30.8 06/26/2023    MCHC 32.3 (L) 06/26/2023    RDW 17.1 (H) 06/26/2023    PLT 65 (L) 06/26/2023    MPV 10.8 (H) 06/26/2023    GRAN 2.2 03/19/2010    GRAN 62.2 03/19/2010    LYMPH 24.6 03/19/2010    LYMPH 0.9 (L) 03/19/2010    MONO 11.8 (H) 03/19/2010    MONO 0.4 03/19/2010    EOS 0.0 03/19/2010    BASO 0.0 03/19/2010    EOSINOPHIL 1.1 03/19/2010    BASOPHIL 0.3 03/19/2010       Latest Reference Range & Units 12/18/18 11:30 12/17/19 13:17  06/10/20 09:54 12/15/20 10:11 11/17/22 07:23 06/07/23 16:08   WBC 4.50 - 11.50 x10(3)/mcL 3.8 (L) 4.4 (L) 3.7 (L) 2.8 (L) 3.3 (L) 3.27 (L)   (L): Data is abnormally low   Latest Reference Range & Units 12/18/18 11:30 12/17/19 13:17 06/10/20 09:54 12/15/20 10:11 11/17/22 07:23 06/07/23 16:08   Platelets 130 - 400 x10(3)/mcL 81 (L) 100 (L) 99 (L) 86 (L) 84 (L) 70 (L)   (L): Data is abnormally low    CMP:  Sodium   Date Value Ref Range Status   02/11/2011 137 136 - 145 mmol/L Final     Sodium Level   Date Value Ref Range Status   06/26/2023 139 136 - 145 mmol/L Final     Potassium   Date Value Ref Range Status   02/11/2011 3.7 3.5 - 5.1 mmol/L Final     Potassium Level   Date Value Ref Range Status   06/26/2023 3.8 3.5 - 5.1 mmol/L Final     Chloride   Date Value Ref Range Status   02/11/2011 106 95 - 110 mmol/L Final     CO2   Date Value Ref Range Status   02/11/2011 22 (L) 23.0 - 29.0 mmol/L Final     Carbon Dioxide   Date Value Ref Range Status   06/26/2023 24 23 - 31 mmol/L Final     Glucose   Date Value Ref Range Status   02/11/2011 88 70 - 110 mg/dl Final     BUN   Date Value Ref Range Status   02/11/2011 13 8 - 23 mg/dl Final     Blood Urea Nitrogen   Date Value Ref Range Status   06/26/2023 14.9 9.8 - 20.1 mg/dL Final     Creatinine   Date Value Ref Range Status   06/26/2023 0.77 0.55 - 1.02 mg/dL Final   02/11/2011 0.7 0.5 - 1.4 mg/dl Final     Calcium   Date Value Ref Range Status   02/11/2011 9.4 8.7 - 10.5 mg/dl Final     Calcium Level Total   Date Value Ref Range Status   06/26/2023 8.8 8.4 - 10.2 mg/dL Final     Total Protein   Date Value Ref Range Status   02/11/2011 7.7 6.0 - 8.4 g/dL Final     Albumin   Date Value Ref Range Status   02/11/2011 3.8 3.5 - 5.2 g/dl Final     Albumin Level   Date Value Ref Range Status   06/26/2023 3.3 (L) 3.4 - 4.8 g/dL Final     Total Bilirubin   Date Value Ref Range Status   02/11/2011 0.8 0.1 - 1.0 mg/dl Final     Comment:     For infants and newborns, interpretation  of results should be based  on gestational age, weight and in agreement with clinical  observations.  .  Premature Infant recommended reference ranges:  Up to 24 hours.............<8.0 mg/dl  Up to 48 hours............<12.0 mg/dl  3-5 days..................<15.0 mg/dl  6-29 days.................<15.0 mg/dl     Bilirubin Total   Date Value Ref Range Status   06/26/2023 2.8 (H) <=1.5 mg/dL Final     Alkaline Phosphatase   Date Value Ref Range Status   06/26/2023 78 40 - 150 unit/L Final   02/11/2011 75 55 - 135 U/L Final     AST   Date Value Ref Range Status   02/11/2011 151 (H) 10 - 40 U/L Final     Aspartate Aminotransferase   Date Value Ref Range Status   06/26/2023 41 (H) 5 - 34 unit/L Final     ALT   Date Value Ref Range Status   02/11/2011 166 (H) 10 - 44 U/L Final     Alanine Aminotransferase   Date Value Ref Range Status   06/26/2023 24 0 - 55 unit/L Final     Anion Gap   Date Value Ref Range Status   02/11/2011 14 10 - 20 mmol/L Final     eGFR if    Date Value Ref Range Status   02/11/2011 >60 >60 mL/min Final     Comment:     Estimated glomerular filtration rate (eGFR) is normalized to an  average body surface area of 1.73 square meters.  The calculation  used to obtain the eGFR is the adjusted MDRD equation, which factors  patient sex, age, race, and creatinine result.  Since race is unknown  in our information system, the eGFR values for -American  and Non--American patients are given for each creatinine  result.     eGFR if non    Date Value Ref Range Status   02/11/2011 >60 >60 mL/min Final     Estimated GFR-Non    Date Value Ref Range Status   12/15/2020 >60 mL/min/1.73 m2 Final             Assessment:       1. Neutropenia, unspecified type    2. Thrombocytopenia    3. Chronic liver disease in patient with history of hepatocellular carcinoma    4. Splenomegaly    5. Slurred speech/dysarthria,Unsteady gait - history of CVA??  Versus other  neurological problem--will MRI of the brain.  She may benefit of a neurologist evaluation.  Will discuss next visit        Plan:       Discussed with patient that leukopenia refers to a decreased in the total number of WBCs due to any cause. It can decrease in disease-fighting cells circulating in the blood and increase chances of infections.  A low white blood cell count usually is caused by one of the following: Viral infections or other Infectious diseases,  congenital disorders characterized by diminished bone marrow function, cancer or other malignancies that damage bone marrow, ie, Leukemia, lymphoma, MDS, etc. Autoimmune disorders that destroy white blood cells or bone marrow cells, Lupus, RA, etc. Drugs or Certain medications, such as antibiotics, antiseizure and diuretics. Hypersplenism, a premature destruction of blood cells by the spleen.  Sometimes leukopenia can be secondary to nutritional deficit, i.e.,  Iron deficiency anemia, B12 or folate deficiency, etc. Sometimes endocrine disorder like thyroid disease as well.   Discussed with the patient that thrombocytopenia means low blood platelet counts. Explained to the patient that platelets are small cells that help to stop the bleeding. Thrombocytopenia can be as a result of decreased production of platelets in the bone marrow, increased peripheral destruction or sequestration by the spleen. Factors that can decrease the platelet production may include bone marrow malignancy like leukemia, some types of anemia, ie MDS, drugs or medications, alcohol consumption and some viral infections such as hepatitis or HIV. Factors that can increase the peripheral destruction including autoimmune disorders like ITP, TTP, DIC, sepsis, medications (tapering, quinine, sulfa containing antibiotics and anticonvulsants), etc. Spleen is an organ of the immune system and sometimes thrombocytopenia can be a results of splenic sequestratio; platelet stay longer in the spleen  because is enlarged or destruction when identified platelets coated with antibodies as foreign and destroy them.      She will have basic blood work today.  I ginger request [facility] record [facility]  MRI of the brain to evaluate weakness and frequent fall  RTC in 2 weeks to discuss results    The patient was seen, interviewed and examined. Pertinent lab and radiology studies were reviewed.   The patient was given ample opportunity to ask questions, and to the best of my abilities, all questions answered to satisfaction; patient demonstrated understanding of what we discussed and agreeable to the plan. Pt instructed to call should develop concerning signs/symptoms or have further questions.     I'd like to thank for referring and allowing me the opportunity to participate in the care of this patient and if any questions, please do not hesitate to call the office at [phone].         Rebcea Hagan MD  Hematology/Oncology

## 2023-06-27 ENCOUNTER — TELEPHONE (OUTPATIENT)
Dept: HEMATOLOGY/ONCOLOGY | Facility: CLINIC | Age: 83
End: 2023-06-27
Payer: MEDICARE

## 2023-06-27 LAB
ANTINUCLEAR ANTIBODY SCREEN (OHS): NEGATIVE
CENTROMERE QUANT (OHS): 0.7 U/ML
DSDNA AB QUANT (OHS): 5.2 IU/ML
HEMATOLOGIST REVIEW: NORMAL
JO-1 AB QUANT (OHS): 0.4 U/ML
RNP70 AB QUANT (OHS): 0.4 U/ML
SCL-70S AB QUANT (OHS): 0.7 U/ML
SMITH AB QUANT (OHS): 1.8 U/ML
SSA(RO) AB QUANT (OHS): 0.5 U/ML
SSB(LA) AB QUANT (OHS): <0.4 U/ML
U1RNP AB QUANT (OHS): 0.8 U/ML

## 2023-06-28 ENCOUNTER — APPOINTMENT (OUTPATIENT)
Dept: RADIOLOGY | Facility: HOSPITAL | Age: 83
End: 2023-06-28
Attending: INTERNAL MEDICINE
Payer: MEDICARE

## 2023-06-28 DIAGNOSIS — R26.81 UNSTEADY GAIT: ICD-10-CM

## 2023-06-28 DIAGNOSIS — R42 DIZZINESS: ICD-10-CM

## 2023-06-28 DIAGNOSIS — K76.9 CHRONIC LIVER DISEASE IN PATIENT WITH HISTORY OF HEPATOCELLULAR CARCINOMA: ICD-10-CM

## 2023-06-28 DIAGNOSIS — Z85.05 CHRONIC LIVER DISEASE IN PATIENT WITH HISTORY OF HEPATOCELLULAR CARCINOMA: ICD-10-CM

## 2023-06-28 DIAGNOSIS — R16.1 SPLENOMEGALY: ICD-10-CM

## 2023-06-28 DIAGNOSIS — D69.6 THROMBOCYTOPENIA: ICD-10-CM

## 2023-06-28 DIAGNOSIS — D70.9 NEUTROPENIA, UNSPECIFIED TYPE: ICD-10-CM

## 2023-06-28 LAB
ALBUMIN % SPEP (OHS): 44.6
ALBUMIN SERPL-MCNC: 3 G/DL (ref 3.4–4.8)
ALBUMIN/GLOB SERPL: 0.8 RATIO (ref 1.1–2)
ALPHA 1 GLOB (OHS): 0.22 GM/DL
ALPHA 1 GLOB% (OHS): 3.28
ALPHA 2 GLOB % (OHS): 8.44
ALPHA 2 GLOB (OHS): 0.57 GM/DL
BETA GLOB (OHS): 0.8 GM/DL
BETA GLOB% (OHS): 11.94
GAMMA GLOBULIN % (OHS): 31.74
GAMMA GLOBULIN (OHS): 2.13 GM/DL
GLOBULIN SER-MCNC: 3.7 GM/DL (ref 2.4–3.5)
M SPIKE % (OHS): ABNORMAL
M SPIKE (OHS): ABNORMAL
PATH REV: NORMAL
PROT SERPL-MCNC: 6.7 GM/DL (ref 5.8–7.6)

## 2023-06-28 PROCEDURE — 25500020 PHARM REV CODE 255: Performed by: INTERNAL MEDICINE

## 2023-06-28 PROCEDURE — A9577 INJ MULTIHANCE: HCPCS | Performed by: INTERNAL MEDICINE

## 2023-06-28 PROCEDURE — 70553 MRI BRAIN STEM W/O & W/DYE: CPT | Mod: TC

## 2023-06-28 RX ADMIN — GADOBENATE DIMEGLUMINE 15 ML: 529 INJECTION, SOLUTION INTRAVENOUS at 09:06

## 2023-06-30 LAB
RHEUMATOID FACTOR IGA QUANTITATIVE (OLG): 6.7 IU/ML
RHEUMATOID FACTOR IGM QUANTITATIVE (OLG): 0.9 IU/ML

## 2023-07-05 RX ORDER — LEVOTHYROXINE SODIUM 75 UG/1
75 TABLET ORAL
Qty: 90 TABLET | Refills: 3 | Status: SHIPPED | OUTPATIENT
Start: 2023-07-05 | End: 2023-10-04 | Stop reason: SDUPTHER

## 2023-07-12 ENCOUNTER — OFFICE VISIT (OUTPATIENT)
Dept: HEMATOLOGY/ONCOLOGY | Facility: CLINIC | Age: 83
End: 2023-07-12
Payer: MEDICARE

## 2023-07-12 VITALS
HEIGHT: 63 IN | OXYGEN SATURATION: 99 % | DIASTOLIC BLOOD PRESSURE: 73 MMHG | BODY MASS INDEX: 19.64 KG/M2 | HEART RATE: 66 BPM | TEMPERATURE: 97 F | WEIGHT: 110.88 LBS | SYSTOLIC BLOOD PRESSURE: 117 MMHG

## 2023-07-12 DIAGNOSIS — D72.819 LEUKOPENIA, UNSPECIFIED TYPE: Primary | ICD-10-CM

## 2023-07-12 DIAGNOSIS — R42 DIZZINESS: ICD-10-CM

## 2023-07-12 DIAGNOSIS — F41.1 GENERALIZED ANXIETY DISORDER: ICD-10-CM

## 2023-07-12 DIAGNOSIS — R26.81 UNSTEADY GAIT: ICD-10-CM

## 2023-07-12 DIAGNOSIS — D69.6 THROMBOCYTOPENIA: ICD-10-CM

## 2023-07-12 PROCEDURE — 99214 OFFICE O/P EST MOD 30 MIN: CPT | Mod: PBBFAC | Performed by: INTERNAL MEDICINE

## 2023-07-12 PROCEDURE — 99999 PR PBB SHADOW E&M-EST. PATIENT-LVL IV: CPT | Mod: PBBFAC,,, | Performed by: INTERNAL MEDICINE

## 2023-07-12 PROCEDURE — 99999 PR PBB SHADOW E&M-EST. PATIENT-LVL IV: ICD-10-PCS | Mod: PBBFAC,,, | Performed by: INTERNAL MEDICINE

## 2023-07-12 PROCEDURE — 99214 OFFICE O/P EST MOD 30 MIN: CPT | Mod: S$PBB,,, | Performed by: INTERNAL MEDICINE

## 2023-07-12 PROCEDURE — 99214 PR OFFICE/OUTPT VISIT, EST, LEVL IV, 30-39 MIN: ICD-10-PCS | Mod: S$PBB,,, | Performed by: INTERNAL MEDICINE

## 2023-07-12 RX ORDER — ALPRAZOLAM 2 MG/1
2 TABLET ORAL
Qty: 30 TABLET | Refills: 2 | Status: SHIPPED | OUTPATIENT
Start: 2023-07-12 | End: 2023-08-15 | Stop reason: SDUPTHER

## 2023-07-12 NOTE — PROGRESS NOTES
HEMATOLOGY/ONCOLOGY OFFICE CLINIC VISIT    Visit Information:    Initial Evaluation:  06/26/2023  Referring Provider:  Dr. Angélica Dominguez  Other providers:  Code status:  Not addressed    Diagnosis:  Chronic leukopenia/thrombocytopenia  Splenomegaly    Imaging:  CT AP 7/30/2019: 1. Abnormal appearance to the liver parenchyma, what appears to be segment 4,  with diffuse heterogeneous hyperemia of the liver parenchyma, adjacent to areas of low mixed attenuation, but without discrete enhancing in this geographic region. Separate from this area  is a 12 mm low-attenuation focus within the caudate lobe of the liver, stable in appearance the previous examination. The changes of the  hepatic parenchyma may simply reflect underlying portal hypertension, or possibly previous hepatic infarct. Underlying mass lesion not  excluded. Continued Surveillance exams recommended. 2. Changes of portal hypertension, esophageal, and gastric varices. 3. Splenomegaly.  4. Postoperative changes in the epigastrium presumably from a previous gastroduodenal artery embolization. 5. Colonic diverticulosis, uncomplicated.    MRI Brain 6/28/2023:    1. No acute intracranial process is identified.  2. Abnormal signal in both lenticular nuclei more prominent involving both globus palate I could be due to early calcification or calcium and phosphate abnormalities; hepatobiliary dysfunction including Geraldo disease and increased manganese levels with hepatic does cerebral degeneration; prior hypoxic event or carbon monoxide poisoning; hyperalimentation; and other metabolic processes.  3. Supratentorial and minimal pontine nonenhancing white matter changes are nonspecific but are statistically most likely white matter microvascular ischemic changes are focal gliosis.  4. Diffuse involutional changes most prominent centrally with resultant mild ventriculomegaly are proportionate to the patient's age.  5. Scattered chronic sinus disease.    "    CLINICAL HISTORY:       Patient: Tessa Dozier is a 82 y.o. female kindly referred for neutropenia and thrombocytopenia.  Patient is not a good historian and most of the history is taken by her     Reviewing electronic medical record patient with leukopenia and thrombocytopenia since July of 2012.  Leukopenia has been relatively stable but platelets decreasing over time.  Most recent platelet count 70.      Patient also with history of liver disease with portal hypertension, esophageal and gastric varices and splenomegaly.  She has history of hepatitis-C for which she was treated. Patient also with history of hepatocellular carcinoma for which she received taste and 2 and radiation.  I do not have records of this. She was treated at [facility]    She denies any fever, chills, sweats.  No chest pain or short of breath.  She has fall on her "butt"  x2 and her main complaint is pain in that area.  She did not went to the emergency department.  She looks frail and debilitated.  Gait unsteady and dysarthria.   reports that this on balance and speech has been going on for about a year now. Hepatologist recommend them to see a neurologist.      Chief Complaint: OTHER (PT  is concerns about her ability to walk.)      Interval History:    Patient presents today for follow up to discuss lab and MRI results, she is with her . She continues to have difficulty speaking, walking, uses a rollator walker to aid with ambulation. Otherwise, no new problem or findings.      History reviewed. No pertinent past medical history.   Past Surgical History:   Procedure Laterality Date    ear lobe surgery      EYE SURGERY  2018     History reviewed. No pertinent family history.  Social Connections: Not on file       Review of patient's allergies indicates:  No Known Allergies   Current Outpatient Medications on File Prior to Visit   Medication Sig Dispense Refill    ALPRAZolam (XANAX) 2 MG Tab Take 1 tablet (2 " mg total) by mouth every 24 hours as needed (anxiety). 30 tablet 2    carvediloL (COREG) 3.125 MG tablet TAKE 1 TABLET BY MOUTH TWICE A  tablet 3    levothyroxine (SYNTHROID) 50 MCG tablet TAKE 1 TABLET BY MOUTH EVERY TUESDAY AND THURSDAY 30 tablet 11    levothyroxine (SYNTHROID) 75 MCG tablet Take 1 tablet (75 mcg total) by mouth before breakfast. 90 tablet 3    pravastatin (PRAVACHOL) 40 MG tablet Take 40 mg by mouth once daily.      vibegron 75 mg Tab Take 1 tablet by mouth once daily. 30 tablet 2     No current facility-administered medications on file prior to visit.      Review of Systems   Constitutional:  Positive for activity change and fatigue. Negative for appetite change, chills, fever and unexpected weight change.   HENT:  Negative for mouth dryness, mouth sores, nosebleeds, sore throat and trouble swallowing.    Eyes:  Negative for visual disturbance.   Respiratory:  Negative for cough and shortness of breath.    Cardiovascular:  Negative for chest pain, palpitations and leg swelling.   Gastrointestinal:  Negative for abdominal distention, abdominal pain, blood in stool, change in bowel habit, constipation, diarrhea, nausea, vomiting and change in bowel habit.   Endocrine: Negative.    Genitourinary:  Negative for dysuria, frequency, hematuria and urgency.   Musculoskeletal:  Negative for arthralgias, back pain, myalgias and neck pain.   Integumentary:  Negative for rash.   Neurological:  Positive for dizziness, speech difficulty, weakness and light-headedness. Negative for tremors, syncope, numbness, headaches and memory loss.   Hematological:  Negative for adenopathy. Does not bruise/bleed easily.   Psychiatric/Behavioral:  Negative for confusion and suicidal ideas. The patient is not nervous/anxious.             Vitals:    07/12/23 0937   BP: 117/73   BP Location: Left arm   Patient Position: Sitting   Pulse: 66   Temp: 97.4 °F (36.3 °C)   TempSrc: Oral   SpO2: 99%   Weight: 50.3 kg (110 lb  "14.4 oz)   Height: 5' 3" (1.6 m)        Wt Readings from Last 6 Encounters:   07/12/23 50.3 kg (110 lb 14.4 oz)   06/26/23 49.2 kg (108 lb 8 oz)   06/07/23 50.2 kg (110 lb 11.2 oz)   02/13/23 48.5 kg (107 lb)   11/02/22 48 kg (105 lb 12.8 oz)   10/12/21 51 kg (112 lb 5.2 oz)     Body mass index is 19.65 kg/m².  Body surface area is 1.5 meters squared.  Physical Exam  Vitals and nursing note reviewed.   Constitutional:       General: She is not in acute distress.     Appearance: She is ill-appearing (chronically).      Comments: Looks frail and debilitated.   HENT:      Head: Normocephalic and atraumatic.      Mouth/Throat:      Comments: Mild swollen lips with resolving hematoma lower lip  Eyes:      General: No scleral icterus.     Extraocular Movements: Extraocular movements intact.      Conjunctiva/sclera: Conjunctivae normal.      Pupils: Pupils are equal, round, and reactive to light.   Neck:      Vascular: No JVD.   Cardiovascular:      Rate and Rhythm: Normal rate and regular rhythm.   Pulmonary:      Effort: Pulmonary effort is normal.      Breath sounds: Normal breath sounds.   Abdominal:      General: Bowel sounds are normal. There is no distension.      Palpations: Abdomen is soft. There is no mass.      Tenderness: There is no abdominal tenderness.   Musculoskeletal:         General: No swelling or deformity.      Cervical back: Neck supple.   Lymphadenopathy:      Head:      Right side of head: No submandibular adenopathy.      Left side of head: No submandibular adenopathy.      Cervical: No cervical adenopathy.      Upper Body:      Right upper body: No supraclavicular or axillary adenopathy.      Left upper body: No supraclavicular or axillary adenopathy.      Lower Body: No right inguinal adenopathy. No left inguinal adenopathy.   Skin:     General: Skin is warm.      Coloration: Skin is not jaundiced.      Findings: No lesion or rash.      Nails: There is no clubbing.   Neurological:      Mental " Status: She is alert.      Cranial Nerves: Dysarthria present.      Motor: Weakness present.      Gait: Gait abnormal.   Psychiatric:         Attention and Perception: She is inattentive.         Mood and Affect: Affect is flat.         Behavior: Behavior is slowed.     ECOG SCORE    3 - Capable of only limited selfcare, confined to bed or chair more than 50% of waking hours         Laboratory:  CBC with Differential:  Lab Results   Component Value Date    WBC 3.71 (L) 06/26/2023    RBC 4.26 06/26/2023    HGB 13.1 06/26/2023    HCT 40.5 06/26/2023    MCV 95.1 (H) 06/26/2023    MCH 30.8 06/26/2023    MCHC 32.3 (L) 06/26/2023    RDW 17.1 (H) 06/26/2023    PLT 65 (L) 06/26/2023    MPV 10.8 (H) 06/26/2023    GRAN 2.2 03/19/2010    GRAN 62.2 03/19/2010    LYMPH 24.6 03/19/2010    LYMPH 0.9 (L) 03/19/2010    MONO 11.8 (H) 03/19/2010    MONO 0.4 03/19/2010    EOS 0.0 03/19/2010    BASO 0.0 03/19/2010    EOSINOPHIL 1.1 03/19/2010    BASOPHIL 0.3 03/19/2010       Latest Reference Range & Units 12/18/18 11:30 12/17/19 13:17 06/10/20 09:54 12/15/20 10:11 11/17/22 07:23 06/07/23 16:08   WBC 4.50 - 11.50 x10(3)/mcL 3.8 (L) 4.4 (L) 3.7 (L) 2.8 (L) 3.3 (L) 3.27 (L)   (L): Data is abnormally low   Latest Reference Range & Units 12/18/18 11:30 12/17/19 13:17 06/10/20 09:54 12/15/20 10:11 11/17/22 07:23 06/07/23 16:08   Platelets 130 - 400 x10(3)/mcL 81 (L) 100 (L) 99 (L) 86 (L) 84 (L) 70 (L)   (L): Data is abnormally low    CMP:  Sodium   Date Value Ref Range Status   02/11/2011 137 136 - 145 mmol/L Final     Sodium Level   Date Value Ref Range Status   06/26/2023 139 136 - 145 mmol/L Final     Potassium   Date Value Ref Range Status   02/11/2011 3.7 3.5 - 5.1 mmol/L Final     Potassium Level   Date Value Ref Range Status   06/26/2023 3.8 3.5 - 5.1 mmol/L Final     Chloride   Date Value Ref Range Status   02/11/2011 106 95 - 110 mmol/L Final     CO2   Date Value Ref Range Status   02/11/2011 22 (L) 23.0 - 29.0 mmol/L Final      Carbon Dioxide   Date Value Ref Range Status   06/26/2023 24 23 - 31 mmol/L Final     Glucose   Date Value Ref Range Status   02/11/2011 88 70 - 110 mg/dl Final     BUN   Date Value Ref Range Status   02/11/2011 13 8 - 23 mg/dl Final     Blood Urea Nitrogen   Date Value Ref Range Status   06/26/2023 14.9 9.8 - 20.1 mg/dL Final     Creatinine   Date Value Ref Range Status   06/26/2023 0.77 0.55 - 1.02 mg/dL Final   02/11/2011 0.7 0.5 - 1.4 mg/dl Final     Calcium   Date Value Ref Range Status   02/11/2011 9.4 8.7 - 10.5 mg/dl Final     Calcium Level Total   Date Value Ref Range Status   06/26/2023 8.8 8.4 - 10.2 mg/dL Final     Total Protein   Date Value Ref Range Status   02/11/2011 7.7 6.0 - 8.4 g/dL Final     Albumin   Date Value Ref Range Status   02/11/2011 3.8 3.5 - 5.2 g/dl Final     Albumin Level   Date Value Ref Range Status   06/26/2023 3.3 (L) 3.4 - 4.8 g/dL Final   06/26/2023 3.0 (L) 3.4 - 4.8 g/dL Final     Total Bilirubin   Date Value Ref Range Status   02/11/2011 0.8 0.1 - 1.0 mg/dl Final     Comment:     For infants and newborns, interpretation of results should be based  on gestational age, weight and in agreement with clinical  observations.  .  Premature Infant recommended reference ranges:  Up to 24 hours.............<8.0 mg/dl  Up to 48 hours............<12.0 mg/dl  3-5 days..................<15.0 mg/dl  6-29 days.................<15.0 mg/dl     Bilirubin Total   Date Value Ref Range Status   06/26/2023 2.8 (H) <=1.5 mg/dL Final     Alkaline Phosphatase   Date Value Ref Range Status   06/26/2023 78 40 - 150 unit/L Final   02/11/2011 75 55 - 135 U/L Final     AST   Date Value Ref Range Status   02/11/2011 151 (H) 10 - 40 U/L Final     Aspartate Aminotransferase   Date Value Ref Range Status   06/26/2023 41 (H) 5 - 34 unit/L Final     ALT   Date Value Ref Range Status   02/11/2011 166 (H) 10 - 44 U/L Final     Alanine Aminotransferase   Date Value Ref Range Status   06/26/2023 24 0 - 55 unit/L  Final     Anion Gap   Date Value Ref Range Status   02/11/2011 14 10 - 20 mmol/L Final     eGFR if    Date Value Ref Range Status   02/11/2011 >60 >60 mL/min Final     Comment:     Estimated glomerular filtration rate (eGFR) is normalized to an  average body surface area of 1.73 square meters.  The calculation  used to obtain the eGFR is the adjusted MDRD equation, which factors  patient sex, age, race, and creatinine result.  Since race is unknown  in our information system, the eGFR values for -American  and Non--American patients are given for each creatinine  result.     eGFR if non    Date Value Ref Range Status   02/11/2011 >60 >60 mL/min Final     Estimated GFR-Non    Date Value Ref Range Status   12/15/2020 >60 mL/min/1.73 m2 Final         Assessment:       1. Leukopenia, unspecified type    2. Thrombocytopenia    3. Dizziness    4. Unsteady gait      Slurred speech/dysarthria,Unsteady gait - history of CVA??  Versus other neurological problem--will MRI of the brain.  She may benefit of a neurologist evaluation.  Will discuss next visit        Plan:         Will defer bone marrow biopsy for now, counts stable and patient with multiple medical problems, but may need in the future  Will refer to Dr. Mulligan to evaluate findings on MRI brain and unsteady gait  RTC in 6 months with same day CBC    The patient was seen, interviewed and examined. Pertinent lab and radiology studies were reviewed.   The patient was given ample opportunity to ask questions, and to the best of my abilities, all questions answered to satisfaction; patient demonstrated understanding of what we discussed and agreeable to the plan. Pt instructed to call should develop concerning signs/symptoms or have further questions.     Rebeca Hagan MD  Hematology/Oncology      Professional Services   I, Miladis Miller LPN, acted solely as a scribe for and in the presence of   Rebeca Hagan, who performed these services.

## 2023-08-08 ENCOUNTER — HOSPITAL ENCOUNTER (INPATIENT)
Facility: HOSPITAL | Age: 83
LOS: 6 days | Discharge: HOME HEALTH | DRG: 177 | End: 2023-08-14
Attending: EMERGENCY MEDICINE | Admitting: INTERNAL MEDICINE
Payer: MEDICARE

## 2023-08-08 DIAGNOSIS — R79.89 POSITIVE D DIMER: ICD-10-CM

## 2023-08-08 DIAGNOSIS — I21.4 NSTEMI (NON-ST ELEVATED MYOCARDIAL INFARCTION): ICD-10-CM

## 2023-08-08 DIAGNOSIS — R79.89 ELEVATED BRAIN NATRIURETIC PEPTIDE (BNP) LEVEL: ICD-10-CM

## 2023-08-08 DIAGNOSIS — R41.82 ALTERED MENTAL STATUS, UNSPECIFIED ALTERED MENTAL STATUS TYPE: ICD-10-CM

## 2023-08-08 DIAGNOSIS — U07.1 COVID: Primary | ICD-10-CM

## 2023-08-08 DIAGNOSIS — I50.9 CHF (CONGESTIVE HEART FAILURE): ICD-10-CM

## 2023-08-08 DIAGNOSIS — R06.02 SOB (SHORTNESS OF BREATH): ICD-10-CM

## 2023-08-08 LAB
ALBUMIN SERPL-MCNC: 2.8 G/DL (ref 3.4–4.8)
ALBUMIN/GLOB SERPL: 1 RATIO (ref 1.1–2)
ALP SERPL-CCNC: 54 UNIT/L (ref 40–150)
ALT SERPL-CCNC: 24 UNIT/L (ref 0–55)
AST SERPL-CCNC: 55 UNIT/L (ref 5–34)
AV INDEX (PROSTH): 0.73
AV MEAN GRADIENT: 3 MMHG
AV PEAK GRADIENT: 6 MMHG
AV VALVE AREA BY VELOCITY RATIO: 1.88 CM²
AV VALVE AREA: 1.86 CM²
AV VELOCITY RATIO: 0.74
BASOPHILS # BLD AUTO: 0.02 X10(3)/MCL
BASOPHILS NFR BLD AUTO: 0.9 %
BILIRUB SERPL-MCNC: 2.3 MG/DL
BNP BLD-MCNC: 955.8 PG/ML
BSA FOR ECHO PROCEDURE: 1.42 M2
BUN SERPL-MCNC: 19.6 MG/DL (ref 9.8–20.1)
CALCIUM SERPL-MCNC: 7.9 MG/DL (ref 8.4–10.2)
CHLORIDE SERPL-SCNC: 111 MMOL/L (ref 98–107)
CO2 SERPL-SCNC: 20 MMOL/L (ref 23–31)
CREAT SERPL-MCNC: 0.91 MG/DL (ref 0.55–1.02)
CRP SERPL-MCNC: 13.1 MG/L
D DIMER PPP IA.FEU-MCNC: 2.01 UG/ML FEU (ref 0–0.5)
DOP CALC AO PEAK VEL: 1.19 M/S
DOP CALC AO VTI: 23.2 CM
DOP CALC LVOT AREA: 2.5 CM2
DOP CALC LVOT DIAMETER: 1.8 CM
DOP CALC LVOT PEAK VEL: 0.88 M/S
DOP CALC LVOT STROKE VOLUME: 43.24 CM3
DOP CALC MV VTI: 19.4 CM
DOP CALCLVOT PEAK VEL VTI: 17 CM
E WAVE DECELERATION TIME: 195 MSEC
E/A RATIO: 0.59
E/E' RATIO: 4.95 M/S
EOSINOPHIL # BLD AUTO: 0 X10(3)/MCL (ref 0–0.9)
EOSINOPHIL NFR BLD AUTO: 0 %
ERYTHROCYTE [DISTWIDTH] IN BLOOD BY AUTOMATED COUNT: 17.2 % (ref 11.5–17)
ERYTHROCYTE [SEDIMENTATION RATE] IN BLOOD: 3 MM/HR (ref 0–20)
FERRITIN SERPL-MCNC: 68.63 NG/ML (ref 4.63–204)
GFR SERPLBLD CREATININE-BSD FMLA CKD-EPI: >60 MLS/MIN/1.73/M2
GLOBULIN SER-MCNC: 2.9 GM/DL (ref 2.4–3.5)
GLUCOSE SERPL-MCNC: 104 MG/DL (ref 82–115)
HCT VFR BLD AUTO: 32.3 % (ref 37–47)
HGB BLD-MCNC: 11.3 G/DL (ref 12–16)
IMM GRANULOCYTES # BLD AUTO: 0.01 X10(3)/MCL (ref 0–0.04)
IMM GRANULOCYTES NFR BLD AUTO: 0.4 %
LDH SERPL-CCNC: 235 U/L (ref 125–220)
LEFT ATRIUM SIZE: 3.2 CM
LV LATERAL E/E' RATIO: 4.7 M/S
LV SEPTAL E/E' RATIO: 5.22 M/S
LVOT MG: 2 MMHG
LVOT MV: 0.57 CM/S
LYMPHOCYTES # BLD AUTO: 0.61 X10(3)/MCL (ref 0.6–4.6)
LYMPHOCYTES NFR BLD AUTO: 26.8 %
MCH RBC QN AUTO: 32.5 PG (ref 27–31)
MCHC RBC AUTO-ENTMCNC: 35 G/DL (ref 33–36)
MCV RBC AUTO: 92.8 FL (ref 80–94)
MONOCYTES # BLD AUTO: 0.59 X10(3)/MCL (ref 0.1–1.3)
MONOCYTES NFR BLD AUTO: 25.9 %
MV MEAN GRADIENT: 1 MMHG
MV PEAK A VEL: 0.8 M/S
MV PEAK E VEL: 0.47 M/S
MV PEAK GRADIENT: 3 MMHG
MV STENOSIS PRESSURE HALF TIME: 43 MS
MV VALVE AREA BY CONTINUITY EQUATION: 2.23 CM2
MV VALVE AREA P 1/2 METHOD: 5.12 CM2
NEUTROPHILS # BLD AUTO: 1.05 X10(3)/MCL (ref 2.1–9.2)
NEUTROPHILS NFR BLD AUTO: 46 %
NRBC BLD AUTO-RTO: 0 %
PISA TR MAX VEL: 2.2 M/S
PLATELET # BLD AUTO: 41 X10(3)/MCL (ref 130–400)
PMV BLD AUTO: 10.1 FL (ref 7.4–10.4)
POTASSIUM SERPL-SCNC: 3.9 MMOL/L (ref 3.5–5.1)
PROT SERPL-MCNC: 5.7 GM/DL (ref 5.8–7.6)
PV PEAK GRADIENT: 4 MMHG
PV PEAK VELOCITY: 0.95 M/S
RA PRESSURE ESTIMATED: 3 MMHG
RBC # BLD AUTO: 3.48 X10(6)/MCL (ref 4.2–5.4)
RV TB RVSP: 5 MMHG
SODIUM SERPL-SCNC: 136 MMOL/L (ref 136–145)
TDI LATERAL: 0.1 M/S
TDI SEPTAL: 0.09 M/S
TDI: 0.1 M/S
TR MAX PG: 19 MMHG
TRICUSPID ANNULAR PLANE SYSTOLIC EXCURSION: 2.24 CM
TROPONIN I SERPL-MCNC: 0.13 NG/ML (ref 0–0.04)
TROPONIN I SERPL-MCNC: 0.15 NG/ML (ref 0–0.04)
TV REST PULMONARY ARTERY PRESSURE: 22 MMHG
WBC # SPEC AUTO: 2.28 X10(3)/MCL (ref 4.5–11.5)

## 2023-08-08 PROCEDURE — 93005 ELECTROCARDIOGRAM TRACING: CPT

## 2023-08-08 PROCEDURE — 27000207 HC ISOLATION

## 2023-08-08 PROCEDURE — 83880 ASSAY OF NATRIURETIC PEPTIDE: CPT | Performed by: EMERGENCY MEDICINE

## 2023-08-08 PROCEDURE — 21400001 HC TELEMETRY ROOM

## 2023-08-08 PROCEDURE — 25000003 PHARM REV CODE 250

## 2023-08-08 PROCEDURE — 96374 THER/PROPH/DIAG INJ IV PUSH: CPT

## 2023-08-08 PROCEDURE — 87040 BLOOD CULTURE FOR BACTERIA: CPT | Performed by: INTERNAL MEDICINE

## 2023-08-08 PROCEDURE — 85025 COMPLETE CBC W/AUTO DIFF WBC: CPT | Performed by: EMERGENCY MEDICINE

## 2023-08-08 PROCEDURE — 25500020 PHARM REV CODE 255: Performed by: INTERNAL MEDICINE

## 2023-08-08 PROCEDURE — 85652 RBC SED RATE AUTOMATED: CPT | Performed by: PHYSICIAN ASSISTANT

## 2023-08-08 PROCEDURE — 83615 LACTATE (LD) (LDH) ENZYME: CPT | Performed by: PHYSICIAN ASSISTANT

## 2023-08-08 PROCEDURE — 86140 C-REACTIVE PROTEIN: CPT | Performed by: PHYSICIAN ASSISTANT

## 2023-08-08 PROCEDURE — 11000001 HC ACUTE MED/SURG PRIVATE ROOM

## 2023-08-08 PROCEDURE — 36415 COLL VENOUS BLD VENIPUNCTURE: CPT

## 2023-08-08 PROCEDURE — 63600175 PHARM REV CODE 636 W HCPCS

## 2023-08-08 PROCEDURE — 80053 COMPREHEN METABOLIC PANEL: CPT | Performed by: EMERGENCY MEDICINE

## 2023-08-08 PROCEDURE — 84484 ASSAY OF TROPONIN QUANT: CPT | Performed by: PHYSICIAN ASSISTANT

## 2023-08-08 PROCEDURE — 99285 EMERGENCY DEPT VISIT HI MDM: CPT | Mod: 25

## 2023-08-08 PROCEDURE — 82728 ASSAY OF FERRITIN: CPT | Performed by: PHYSICIAN ASSISTANT

## 2023-08-08 PROCEDURE — 84484 ASSAY OF TROPONIN QUANT: CPT | Performed by: EMERGENCY MEDICINE

## 2023-08-08 PROCEDURE — 63600175 PHARM REV CODE 636 W HCPCS: Performed by: EMERGENCY MEDICINE

## 2023-08-08 PROCEDURE — 85379 FIBRIN DEGRADATION QUANT: CPT | Performed by: PHYSICIAN ASSISTANT

## 2023-08-08 RX ORDER — ACETAMINOPHEN 500 MG
1000 TABLET ORAL
Status: DISPENSED | OUTPATIENT
Start: 2023-08-08 | End: 2023-08-08

## 2023-08-08 RX ORDER — MUPIROCIN 20 MG/G
OINTMENT TOPICAL 2 TIMES DAILY
Status: DISPENSED | OUTPATIENT
Start: 2023-08-08 | End: 2023-08-13

## 2023-08-08 RX ORDER — PRAVASTATIN SODIUM 40 MG/1
40 TABLET ORAL DAILY
Status: DISCONTINUED | OUTPATIENT
Start: 2023-08-09 | End: 2023-08-14 | Stop reason: HOSPADM

## 2023-08-08 RX ORDER — FUROSEMIDE 10 MG/ML
40 INJECTION INTRAMUSCULAR; INTRAVENOUS
Status: COMPLETED | OUTPATIENT
Start: 2023-08-08 | End: 2023-08-08

## 2023-08-08 RX ORDER — DEXAMETHASONE SODIUM PHOSPHATE 4 MG/ML
8 INJECTION, SOLUTION INTRA-ARTICULAR; INTRALESIONAL; INTRAMUSCULAR; INTRAVENOUS; SOFT TISSUE
Status: COMPLETED | OUTPATIENT
Start: 2023-08-08 | End: 2023-08-08

## 2023-08-08 RX ORDER — SODIUM CHLORIDE 9 MG/ML
INJECTION, SOLUTION INTRAVENOUS CONTINUOUS
Status: DISCONTINUED | OUTPATIENT
Start: 2023-08-08 | End: 2023-08-10

## 2023-08-08 RX ORDER — ALPRAZOLAM 0.5 MG/1
1 TABLET ORAL NIGHTLY
Status: DISCONTINUED | OUTPATIENT
Start: 2023-08-08 | End: 2023-08-14 | Stop reason: HOSPADM

## 2023-08-08 RX ADMIN — DEXAMETHASONE SODIUM PHOSPHATE 8 MG: 4 INJECTION, SOLUTION INTRA-ARTICULAR; INTRALESIONAL; INTRAMUSCULAR; INTRAVENOUS; SOFT TISSUE at 11:08

## 2023-08-08 RX ADMIN — FUROSEMIDE 40 MG: 10 INJECTION, SOLUTION INTRAMUSCULAR; INTRAVENOUS at 01:08

## 2023-08-08 RX ADMIN — REMDESIVIR 200 MG: 100 INJECTION, POWDER, LYOPHILIZED, FOR SOLUTION INTRAVENOUS at 01:08

## 2023-08-08 RX ADMIN — IOPAMIDOL 50 ML: 755 INJECTION, SOLUTION INTRAVENOUS at 04:08

## 2023-08-08 NOTE — CONSULTS
Inpatient consult to Cardiology  Consult performed by: Jenna Ramirez FNP  Consult ordered by: Kris Carney PA-C  Reason for consult: NSTEMI      [facility]    Cardiology  Consult Note    Patient Name: Tessa Dozier  MRN: [id number]  Admission Date: 8/8/2023  Hospital Length of Stay: 0 days  Code Status: No Order   Attending Provider: Bebo Hanson MD   Consulting Provider: DEBBIE Bhagat  Primary Care Physician: Angélica Dominguez MD  Principal Problem:<principal problem not specified>    Patient information was obtained from past medical records and ER records.     Subjective:     Reason for consult: NSTEMI      HPI:   Ms. Dozier is an 82 year old female who is known to [facility], Dr. Quesada. She presents to the ER on 8.8.23 for AMS. Per her , she was seen in the ER yesterday with chief complaint of weakness and fever and was diagnosed with COVID. He reports that she has had worsened lethargy and confusion since yesterday. Significant labs include WBC 2.28, RBC 3.48, H&H 11.3/32.3, Plt 41, .8, & trop 0.151. A CXR was obtained and demonstrated a small left pleural effusion. She was admitted to hospital medicine. [facility] has been consulted to further evaluate the patient's elevated troponin.     PMH: HTN, HLD, hypothyroidism, hepatocellular carcinoma, esophageal varices, anxiety, RBBB  PSH: ear lobe surgery, eye surgery, breast surgery, hysterectomy  Family History: None  Social History: No history of alcohol, tobacco, or illicit drug use.     Previous Cardiac Diagnostics:   TTE 12.29.22:  The study quality is average.   The left ventricle is normal in size. Global left ventricular systolic function is normal. The left ventricular ejection fraction is 70%. The left ventricle diastolic function is impaired (Grade I) with normal left atrial pressure.  Valvular structure are normal in appearance and function within study limits.   The estimated pulmonary  "artery systolic pressure is 23 mmHg assuming a right atrial pressure of 3 mmHg. The pulmonary artery appears to be normal.    Cardiac Calcium Score 12.29.22:  88    Review of patient's allergies indicates:   Allergen Reactions    Promethazine      Other reaction(s): hallucinates    Azo-sulfisoxazole      Other reaction(s): rash    Ciprofloxacin      Other reaction(s): hives    Linaclotide      Other reaction(s): "feels awful"       Current Facility-Administered Medications on File Prior to Encounter   Medication    [COMPLETED] cefTRIAXone (ROCEPHIN) 2 g in dextrose 5 % in water (D5W) 100 mL IVPB (MB+)    [COMPLETED] lactated ringers bolus 1,476 mL    [COMPLETED] acetaminophen tablet 1,000 mg    [DISCONTINUED] lactated ringers bolus 30 mL/kg     Current Outpatient Medications on File Prior to Encounter   Medication Sig    ALPRAZolam (XANAX) 2 MG Tab Take 1 tablet (2 mg total) by mouth every 24 hours as needed (anxiety).    carvediloL (COREG) 3.125 MG tablet TAKE 1 TABLET BY MOUTH TWICE A DAY    levothyroxine (SYNTHROID) 50 MCG tablet TAKE 1 TABLET BY MOUTH EVERY TUESDAY AND THURSDAY    levothyroxine (SYNTHROID) 75 MCG tablet Take 1 tablet (75 mcg total) by mouth before breakfast.    pravastatin (PRAVACHOL) 40 MG tablet Take 40 mg by mouth once daily.    vibegron 75 mg Tab Take 1 tablet by mouth once daily.     Review of Systems   Unable to perform ROS: Acuity of condition       Objective:     Vital Signs (Most Recent):  Temp: 100 °F (37.8 °C) (08/08/23 1138)  Pulse: 89 (08/08/23 1138)  Resp: 14 (08/08/23 1138)  BP: 117/63 (08/08/23 1138)  SpO2: (!) 94 % (08/08/23 1138) Vital Signs (24h Range):  Temp:  [98.9 °F (37.2 °C)-100.3 °F (37.9 °C)] 100 °F (37.8 °C)  Pulse:  [89-97] 89  Resp:  [14-23] 14  SpO2:  [94 %-98 %] 94 %  BP: (117-126)/(58-63) 117/63     Weight: 45.4 kg (100 lb)  Body mass index is 17.71 kg/m².    SpO2: (!) 94 %       No intake or output data in the 24 hours ending 08/08/23 1523    Lines/Drains/Airways "       Peripheral Intravenous Line  Duration                  Peripheral IV - Single Lumen 08/08/23 20 G Left Antecubital <1 day                    Significant Labs:  Recent Results (from the past 72 hour(s))   Comprehensive metabolic panel    Collection Time: 08/07/23  6:51 PM   Result Value Ref Range    Sodium Level 137 136 - 145 mmol/L    Potassium Level 4.1 3.5 - 5.1 mmol/L    Chloride 112 (H) 98 - 107 mmol/L    Carbon Dioxide 20 (L) 23 - 31 mmol/L    Glucose Level 113 82 - 115 mg/dL    Blood Urea Nitrogen 19.0 9.8 - 20.1 mg/dL    Creatinine 0.78 0.55 - 1.02 mg/dL    Calcium Level Total 8.4 8.4 - 10.2 mg/dL    Protein Total 5.9 5.8 - 7.6 gm/dL    Albumin Level 3.0 (L) 3.4 - 4.8 g/dL    Globulin 2.9 2.4 - 3.5 gm/dL    Albumin/Globulin Ratio 1.0 (L) 1.1 - 2.0 ratio    Bilirubin Total 3.0 (H) <=1.5 mg/dL    Alkaline Phosphatase 61 40 - 150 unit/L    Alanine Aminotransferase 21 0 - 55 unit/L    Aspartate Aminotransferase 38 (H) 5 - 34 unit/L    eGFR >60 mls/min/1.73/m2   Lactic acid, plasma #1    Collection Time: 08/07/23  6:51 PM   Result Value Ref Range    Lactic Acid Level 1.9 0.5 - 2.2 mmol/L   Magnesium    Collection Time: 08/07/23  6:51 PM   Result Value Ref Range    Magnesium Level 1.70 1.60 - 2.60 mg/dL   Phosphorus    Collection Time: 08/07/23  6:51 PM   Result Value Ref Range    Phosphorus Level 2.3 2.3 - 4.7 mg/dL   APTT    Collection Time: 08/07/23  6:51 PM   Result Value Ref Range    PTT 36.9 (H) 23.2 - 33.7 seconds   Troponin I    Collection Time: 08/07/23  6:51 PM   Result Value Ref Range    Troponin-I <0.010 0.000 - 0.045 ng/mL   CBC with Differential    Collection Time: 08/07/23  6:51 PM   Result Value Ref Range    WBC 2.14 (L) 4.50 - 11.50 x10(3)/mcL    RBC 3.51 (L) 4.20 - 5.40 x10(6)/mcL    Hgb 11.6 (L) 12.0 - 16.0 g/dL    Hct 32.2 (L) 37.0 - 47.0 %    MCV 91.7 80.0 - 94.0 fL    MCH 33.0 (H) 27.0 - 31.0 pg    MCHC 36.0 33.0 - 36.0 g/dL    RDW 17.2 (H) 11.5 - 17.0 %    Platelet 47 (L) 130 - 400  x10(3)/mcL    MPV 11.0 (H) 7.4 - 10.4 fL    Neut % 69.6 %    Lymph % 10.3 %    Mono % 16.8 %    Eos % 1.9 %    Basophil % 0.9 %    Lymph # 0.22 (L) 0.6 - 4.6 x10(3)/mcL    Neut # 1.49 (L) 2.1 - 9.2 x10(3)/mcL    Mono # 0.36 0.1 - 1.3 x10(3)/mcL    Eos # 0.04 0 - 0.9 x10(3)/mcL    Baso # 0.02 <=0.2 x10(3)/mcL    IG# 0.01 0 - 0.04 x10(3)/mcL    IG% 0.5 %    NRBC% 0.0 %   COVID/FLU A&B PCR    Collection Time: 08/07/23  7:55 PM   Result Value Ref Range    Influenza A PCR Not Detected Not Detected    Influenza B PCR Not Detected Not Detected    SARS-CoV-2 PCR Detected (A) Not Detected, Negative, Invalid   Urinalysis, Reflex to Urine Culture    Collection Time: 08/07/23  8:13 PM    Specimen: Urine   Result Value Ref Range    Color, UA Dark Yellow Yellow, Light-Yellow, Dark Yellow, Lucia, Straw    Appearance, UA Clear Clear    Specific Gravity, UA 1.017 1.005 - 1.030    pH, UA 6.5 5.0 - 8.5    Protein, UA Negative Negative    Glucose, UA Negative Negative, Normal    Ketones, UA Negative Negative    Blood, UA 3+ (A) Negative    Bilirubin, UA Negative Negative    Urobilinogen, UA 1.0 0.2, 1.0, Normal    Nitrites, UA Negative Negative    Leukocyte Esterase, UA Negative Negative   Urinalysis, Microscopic    Collection Time: 08/07/23  8:13 PM   Result Value Ref Range    RBC, UA 72 (H) <=5 /HPF    WBC, UA <5 <=5 /HPF    Squamous Epithelial Cells, UA <5 <=5 /HPF    Bacteria, UA None Seen None Seen, Rare, Occasional /HPF   Comprehensive metabolic panel    Collection Time: 08/08/23 11:56 AM   Result Value Ref Range    Sodium Level 136 136 - 145 mmol/L    Potassium Level 3.9 3.5 - 5.1 mmol/L    Chloride 111 (H) 98 - 107 mmol/L    Carbon Dioxide 20 (L) 23 - 31 mmol/L    Glucose Level 104 82 - 115 mg/dL    Blood Urea Nitrogen 19.6 9.8 - 20.1 mg/dL    Creatinine 0.91 0.55 - 1.02 mg/dL    Calcium Level Total 7.9 (L) 8.4 - 10.2 mg/dL    Protein Total 5.7 (L) 5.8 - 7.6 gm/dL    Albumin Level 2.8 (L) 3.4 - 4.8 g/dL    Globulin 2.9 2.4 -  3.5 gm/dL    Albumin/Globulin Ratio 1.0 (L) 1.1 - 2.0 ratio    Bilirubin Total 2.3 (H) <=1.5 mg/dL    Alkaline Phosphatase 54 40 - 150 unit/L    Alanine Aminotransferase 24 0 - 55 unit/L    Aspartate Aminotransferase 55 (H) 5 - 34 unit/L    eGFR >60 mls/min/1.73/m2   Brain natriuretic peptide    Collection Time: 08/08/23 11:56 AM   Result Value Ref Range    Natriuretic Peptide 955.8 (H) <=100.0 pg/mL   Troponin I    Collection Time: 08/08/23 11:56 AM   Result Value Ref Range    Troponin-I 0.151 (H) 0.000 - 0.045 ng/mL   CBC with Differential    Collection Time: 08/08/23 11:56 AM   Result Value Ref Range    WBC 2.28 (L) 4.50 - 11.50 x10(3)/mcL    RBC 3.48 (L) 4.20 - 5.40 x10(6)/mcL    Hgb 11.3 (L) 12.0 - 16.0 g/dL    Hct 32.3 (L) 37.0 - 47.0 %    MCV 92.8 80.0 - 94.0 fL    MCH 32.5 (H) 27.0 - 31.0 pg    MCHC 35.0 33.0 - 36.0 g/dL    RDW 17.2 (H) 11.5 - 17.0 %    Platelet 41 (L) 130 - 400 x10(3)/mcL    MPV 10.1 7.4 - 10.4 fL    Neut % 46.0 %    Lymph % 26.8 %    Mono % 25.9 %    Eos % 0.0 %    Basophil % 0.9 %    Lymph # 0.61 0.6 - 4.6 x10(3)/mcL    Neut # 1.05 (L) 2.1 - 9.2 x10(3)/mcL    Mono # 0.59 0.1 - 1.3 x10(3)/mcL    Eos # 0.00 0 - 0.9 x10(3)/mcL    Baso # 0.02 <=0.2 x10(3)/mcL    IG# 0.01 0 - 0.04 x10(3)/mcL    IG% 0.4 %    NRBC% 0.0 %   Ferritin    Collection Time: 08/08/23  1:51 PM   Result Value Ref Range    Ferritin Level 68.63 4.63 - 204.00 ng/mL   Lactate Dehydrogenase    Collection Time: 08/08/23  1:51 PM   Result Value Ref Range    Lactate Dehydrogenase 235 (H) 125 - 220 U/L   Sedimentation rate    Collection Time: 08/08/23  1:51 PM   Result Value Ref Range    Sed Rate 3 0 - 20 mm/hr   D-Dimer, Quantitative    Collection Time: 08/08/23  1:52 PM   Result Value Ref Range    D-Dimer 2.01 (H) 0.00 - 0.50 ug/mL FEU   C-Reactive Protein    Collection Time: 08/08/23  2:01 PM   Result Value Ref Range    C-Reactive Protein 13.10 (H) <5.00 mg/L       Significant Imaging:  Imaging Results              CT Head  Without Contrast (Final result)  Result time 08/08/23 13:12:29      Final result by Reena Jung MD (08/08/23 13:12:29)                   Impression:      Chronic age-related changes.  No acute process      Electronically signed by: Reena Jung  Date:    08/08/2023  Time:    13:12               Narrative:    EXAMINATION:  CT HEAD WITHOUT CONTRAST    CLINICAL HISTORY:  Mental status change, unknown cause;    TECHNIQUE:  Multiple axial images were obtained from the base of the brain to the vertex without contrast administration.  Sagittal and coronal reconstructions were performed..Automatic exposure control is utilized to reduce patient radiation exposure.    COMPARISON:  MRI of the brain from 06/28/2023    FINDINGS:  There is no intracranial mass or lesion seen.  No hemorrhage is seen.  No acute infarct is seen.  There is some cerebral atrophy seen.  There is some compensatory ventricular dilatation and periventricular white matter changes consistent with the patient's age.  Calvarium is intact.  The posterior fossa is unremarkable..  The visualized portions of the paranasal sinuses show no acute abnormality.                                       X-Ray Chest 1 View (Final result)  Result time 08/08/23 12:40:34      Final result by Nik Miguel MD (08/08/23 12:40:34)                   Impression:      Small left pleural effusion, new from the prior study      Electronically signed by: Nik Miguel MD  Date:    08/08/2023  Time:    12:40               Narrative:    EXAMINATION:  XR CHEST 1 VIEW    CLINICAL HISTORY:  Shortness of breath    COMPARISON:  08/07/2023    FINDINGS:  Single view of the chest shows new small left pleural fluid.  No pneumothorax or lobar type consolidation.  Aorta is partially calcified.  Heart is upper normal in size.                                      EKG:        Telemetry:  SR    Physical Exam  HENT:      Head: Normocephalic.      Mouth/Throat:      Mouth: Mucous  membranes are moist.   Eyes:      Extraocular Movements: Extraocular movements intact.   Cardiovascular:      Rate and Rhythm: Normal rate and regular rhythm.      Pulses: Normal pulses.      Heart sounds: Murmur heard.   Pulmonary:      Effort: Pulmonary effort is normal.      Breath sounds: Normal breath sounds.   Abdominal:      Palpations: Abdomen is soft.   Skin:     General: Skin is warm and dry.   Neurological:      Mental Status: She is alert.      Comments: Confused conversation          Home Medications:   Current Facility-Administered Medications on File Prior to Encounter   Medication Dose Route Frequency Provider Last Rate Last Admin    [COMPLETED] cefTRIAXone (ROCEPHIN) 2 g in dextrose 5 % in water (D5W) 100 mL IVPB (MB+)  2 g Intravenous Once Lauchner, Esvin G, FNP   Stopped at 08/07/23 1955    [COMPLETED] lactated ringers bolus 1,476 mL  30 mL/kg Intravenous ED 1 Time Lauchner, Esvin G, FNP   Stopped at 08/07/23 2000    [COMPLETED] acetaminophen tablet 1,000 mg  1,000 mg Oral ED 1 Time Lauchner, Esvin G, FNP        [DISCONTINUED] lactated ringers bolus 30 mL/kg  30 mL/kg Intravenous Once Lauchner, Esvin G, FNP         Current Outpatient Medications on File Prior to Encounter   Medication Sig Dispense Refill    ALPRAZolam (XANAX) 2 MG Tab Take 1 tablet (2 mg total) by mouth every 24 hours as needed (anxiety). 30 tablet 2    carvediloL (COREG) 3.125 MG tablet TAKE 1 TABLET BY MOUTH TWICE A  tablet 3    levothyroxine (SYNTHROID) 50 MCG tablet TAKE 1 TABLET BY MOUTH EVERY TUESDAY AND THURSDAY 30 tablet 11    levothyroxine (SYNTHROID) 75 MCG tablet Take 1 tablet (75 mcg total) by mouth before breakfast. 90 tablet 3    pravastatin (PRAVACHOL) 40 MG tablet Take 40 mg by mouth once daily.      vibegron 75 mg Tab Take 1 tablet by mouth once daily. 30 tablet 2       Current Inpatient Medications:    Current Facility-Administered Medications:     acetaminophen tablet 1,000 mg, 1,000 mg, Oral, ED 1 Time,  Art Messer MD    [COMPLETED] remdesivir 200 mg in sodium chloride 0.9% 250 mL infusion, 200 mg, Intravenous, Q24H, Stopped at 08/08/23 1410 **FOLLOWED BY** [START ON 8/9/2023] remdesivir 100 mg in sodium chloride 0.9% 100 mL infusion, 100 mg, Intravenous, Daily, Jackeline Kiser MD    Current Outpatient Medications:     ALPRAZolam (XANAX) 2 MG Tab, Take 1 tablet (2 mg total) by mouth every 24 hours as needed (anxiety)., Disp: 30 tablet, Rfl: 2    carvediloL (COREG) 3.125 MG tablet, TAKE 1 TABLET BY MOUTH TWICE A DAY, Disp: 180 tablet, Rfl: 3    levothyroxine (SYNTHROID) 50 MCG tablet, TAKE 1 TABLET BY MOUTH EVERY TUESDAY AND THURSDAY, Disp: 30 tablet, Rfl: 11    levothyroxine (SYNTHROID) 75 MCG tablet, Take 1 tablet (75 mcg total) by mouth before breakfast., Disp: 90 tablet, Rfl: 3    pravastatin (PRAVACHOL) 40 MG tablet, Take 40 mg by mouth once daily., Disp: , Rfl:     vibegron 75 mg Tab, Take 1 tablet by mouth once daily., Disp: 30 tablet, Rfl: 2         VTE Risk Mitigation (From admission, onward)      None            Assessment:   Acute COVID 19    - (+) 8.7.23  NSTEMI - likely type 2 in the setting of COVID 19  Acute Metabolic Encephalopathy  Pancytopenia   Elevated BNP  Elevated D-dimer  Diarrhea   RBBB  HTN  HLD  Hypothyroidism  Hx Hepatocellular Carcinoma   Hx Esophageal Varices      Plan:   Obtain Echo.  Trend troponin x 3.   CTA chest & BLE NIVA pending.   Unable to start ASA s/t thrombocytopenia.  Keep K > 4 & Mg > 2.     Thank you for your consult.     Jenna Ramirez, DEBBIE  Cardiology  [facility] - Emergency Dept  08/08/2023 3:23 PM     Physician addendum:  I have seen and examined this patient as a split-shared visit with the [facility] d/t complicated medical management of above problems written in assessment and high acuity requiring physician expertise in medical decision-making. I performed the substantive portion of the history and exam. Above medical decision-making is also  formulated by me.    Cardiovascular exam:  S1, S2  Lungs:  Fine crackles at bases.  Extremities:  1+ Edema bilaterally    Plan:  Non-STEMI type 2 in the setting of COVID-19.  Follow up troponins.  If troponin remains flat and echo does not show any wall motion abnormalities we will sign off.      Lj Bowers MD  Cardiologist

## 2023-08-08 NOTE — H&P
[facility] Hospital Medicine History & Physical Examination       Patient Name: Tessa Dozier  MRN: [id number]  Patient Class: IP- Inpatient   Admission Date: 8/8/2023   Admitting Physician: Bebo Hanson MD  Length of Stay: 0  Attending Physician: Bebo Hanson MD  Primary Care Provider: Angélica Dominguez MD  Face-to-Face encounter date: 08/08/2023  Code Status: Full code   Chief Complaint: Altered Mental Status (EMS reports pt. Dx Covid + yesterday, AMS-confusion,  reports lethargy, cbg 105)        Patient information was obtained from patient, patient's family, past medical records and ER records.     HISTORY OF PRESENT ILLNESS:   Tessa Dozier is a 82 y.o. female with a past medical history of essential hypertension, hyperlipidemia, and hypothyroidism presented to [facility] on 8/8/2023 for altered mental status.   reported to ED that patient has had increased confusion and lethargy since yesterday. Patient was seen in ED yesterday with primary complaint of weakness and fever and was diagnosed with COVID.  History is limited secondary to patient's medical condition and majority of the history was obtained from review of the medical record.  Initial vital signs in ED were /63, pulse 89, respirations 14, temperature 37.8° C, and SpO2 94% on room air.  Labs revealed WBC 2.28, RBC 3.48, hemoglobin 11.3, hematocrit 32.3, platelets 41, chloride 111, CO2 20, .8, and troponin 0.151.  Chest x-ray revealed small left pleural effusion. Patient was given IV Remdesivir 200 mg, IV dexamethasone 8 mg, and IV lasix 40 mg in ED. Patient was admitted to hospital medicine service for further medical management.     PAST MEDICAL HISTORY:   Essential hypertension  Hyperlipidemia   Hypothyroidism     PAST SURGICAL HISTORY:     Past Surgical History:   Procedure Laterality Date    ear lobe surgery      EYE SURGERY  2018       ALLERGIES:   Promethazine, Azo-sulfisoxazole,  Ciprofloxacin, and Linaclotide    FAMILY HISTORY:   Unobtainable    SOCIAL HISTORY:   Unobtainable    HOME MEDICATIONS:     Prior to Admission medications    Medication Sig Start Date End Date Taking? Authorizing Provider   ALPRAZolam (XANAX) 2 MG Tab Take 1 tablet (2 mg total) by mouth every 24 hours as needed (anxiety). 7/12/23   Angélica Dominguez MD   carvediloL (COREG) 3.125 MG tablet TAKE 1 TABLET BY MOUTH TWICE A DAY 1/9/23   Angélica Dominguez MD   levothyroxine (SYNTHROID) 50 MCG tablet TAKE 1 TABLET BY MOUTH EVERY TUESDAY AND THURSDAY 2/14/23   Angélica Dominguez MD   levothyroxine (SYNTHROID) 75 MCG tablet Take 1 tablet (75 mcg total) by mouth before breakfast. 7/5/23   Angélica Dominguez MD   pravastatin (PRAVACHOL) 40 MG tablet Take 40 mg by mouth once daily. 4/8/23   Provider, Historical   vibegron 75 mg Tab Take 1 tablet by mouth once daily. 6/7/23 9/5/23  Angélica Dominguez MD       REVIEW OF SYSTEMS:   Unobtainable    PHYSICAL EXAM:     VITAL SIGNS: 24 HRS MIN & MAX LAST   Temp  Min: 98.9 °F (37.2 °C)  Max: 100.3 °F (37.9 °C) 100 °F (37.8 °C)   BP  Min: 117/63  Max: 126/62 117/63   Pulse  Min: 89  Max: 97  89   Resp  Min: 14  Max: 23 14   SpO2  Min: 94 %  Max: 98 % (!) 94 %       General appearance: Female in no apparent distress.  HEENNT: Atraumatic head.   Lungs: Clear to auscultation bilaterally.   Heart: Regular rate and rhythm.    Abdomen: Soft, non-distended, non-tender. Bowel sounds are normal.   Extremities: No cyanosis, clubbing, or edema. No deformities.   Skin: No Rash. Warm and dry.   Neuro:  Awake and alert. Oriented to person.  Follows some commands.  Confused.    LABS AND IMAGING:     Recent Labs   Lab 08/07/23  1851 08/08/23  1156   WBC 2.14* 2.28*   RBC 3.51* 3.48*   HGB 11.6* 11.3*   HCT 32.2* 32.3*   MCV 91.7 92.8   MCH 33.0* 32.5*   MCHC 36.0 35.0   RDW 17.2* 17.2*   PLT 47* 41*   MPV 11.0* 10.1       Recent Labs   Lab 08/07/23  1851 08/08/23  1156     136   K 4.1 3.9   CO2 20* 20*   BUN 19.0 19.6   CREATININE 0.78 0.91   CALCIUM 8.4 7.9*   MG 1.70  --    ALBUMIN 3.0* 2.8*   ALKPHOS 61 54   ALT 21 24   AST 38* 55*   BILITOT 3.0* 2.3*       Microbiology Results (last 7 days)       ** No results found for the last 168 hours. **             X-Ray Chest 1 View  Narrative: EXAMINATION:  XR CHEST 1 VIEW    CLINICAL HISTORY:  Shortness of breath    COMPARISON:  08/07/2023    FINDINGS:  Single view of the chest shows new small left pleural fluid.  No pneumothorax or lobar type consolidation.  Aorta is partially calcified.  Heart is upper normal in size.  Impression: Small left pleural effusion, new from the prior study    Electronically signed by: Nik Miguel MD  Date:    08/08/2023  Time:    12:40        ASSESSMENT & PLAN:   Assessment:  Acute COVID-19 infection, + 08/07/2023   NSTEMI, likely type 2   Elevated BNP  Acute metabolic encephalopathy  Leukopenia   Thrombocytopenia - 41,000  Normocytic anemia  Essential hypertension  Hyperlipidemia  Hypothyroidism    Plan:  IV Remdesivir 100 mg daily   LDH, ferritin, D-dimer, CRP, and ESR pending  Cardiac monitoring   Trend troponin q.6 x3  Echo  Cardiology consulted, appreciate recommendations   CT head-chronic age-related changes, no acute process  CTA chest and venous US bilateral lower extremities pending, follow up results   Continue appropriate home medications once med rec updated   Labs in a.m.  Further recommendations per attending MD    VTE Prophylaxis:  SCDs for now secondary to thrombocytopenia; consider anticoagulation if CT chest positive for PE/lower extremity ultrasound positive for DVT    __________________________________________________________________________  INPATIENT LIST OF MEDICATIONS     Scheduled Meds:   acetaminophen  1,000 mg Oral ED 1 Time    furosemide (LASIX) injection  40 mg Intravenous ED 1 Time    remdesivir loading dose infusion  200 mg Intravenous Q24H    Followed by    [START ON  8/9/2023] remdesivir infusion  100 mg Intravenous Daily     Continuous Infusions:  PRN Meds:.      IKris PA-C, have reviewed and discussed the case with Dr. Bebo Hanson MD  Please see the following addendum for further assessment and plan from there attending MD.    08/08/2023    ________________________________________________________________________________    MD Addendum:  I,  assumed care of this patient today at ---am/pm  For the patient encounter, I performed the substantive portion of the visit, I reviewed the PA documentation, treatment plan, and medical decision making.  I had face to face time with this patient     A. History:    B. Physical exam:    C. Medical decision making:    Discharge Planning and Disposition: No mobility needs. Ambulating well. Good social support system.   Anticipated discharge    All diagnosis and differential diagnosis have been reviewed; assessment and plan has been documented; I have personally reviewed the labs and test results that are presently available; I have reviewed the patients medication list; I have reviewed the consulting providers response and recommendations. I have reviewed or attempted to review medical records based upon their availability.    All of the patient and family questions have been addressed and answered. Patient's is agreeable to the above stated plan. I will continue to monitor closely and make adjustments to medical management as needed.      08/08/2023

## 2023-08-08 NOTE — Clinical Note
Diagnosis: COVID [[id number]]   Admitting Provider:: ARTURO WINKLER [[id number]]   Future Attending Provider: ARTURO WINKLER [[id number]]   Reason for IP Medical Treatment  (Clinical interventions that can only be accomplished in the IP setting? ) :: iv medications   I certify that Inpatient services for greater than or equal to 2 midnights are medically necessary:: Yes   Plans for Post-Acute care--if anticipated (pick the single best option):: C. Discharge home with home health services

## 2023-08-08 NOTE — ED PROVIDER NOTES
"Encounter Date: 8/8/2023       History     Chief Complaint   Patient presents with    Altered Mental Status     EMS reports pt. Dx Covid + yesterday, AMS-confusion,  reports lethargy, cbg 105     Patient is a, 82 year old female who presents to the ED with altered mental status since being diagnosed with COVID yesterday. Per report,  said that she is A&Ox3 at baseline, though may have some component of dementia as well. Patient currently states she "does not feel good," and is distressed that she cannot remember her 's name.       The history is provided by the patient and medical records. The history is limited by the condition of the patient.     Review of patient's allergies indicates:   Allergen Reactions    Promethazine      Other reaction(s): hallucinates    Azo-sulfisoxazole      Other reaction(s): rash    Ciprofloxacin      Other reaction(s): hives    Linaclotide      Other reaction(s): "feels awful"     Past Medical History:   Diagnosis Date    Hypercholesteremia     Hypertension     Thyroid disease      Past Surgical History:   Procedure Laterality Date    ear lobe surgery      EYE SURGERY  2018     No family history on file.  Social History     Tobacco Use    Smoking status: Never     Passive exposure: Never    Smokeless tobacco: Never   Substance Use Topics    Alcohol use: Never    Drug use: Never     Review of Systems   Unable to perform ROS: Mental status change       Physical Exam     Initial Vitals [08/08/23 1138]   BP Pulse Resp Temp SpO2   117/63 89 14 100 °F (37.8 °C) (!) 94 %      MAP       --         Physical Exam    Vitals reviewed.  Constitutional: She appears well-developed and well-nourished. She appears distressed.   HENT:   Head: Normocephalic and atraumatic.   Eyes: EOM are normal. Pupils are equal, round, and reactive to light.   Neck: Neck supple.   Normal range of motion.  Cardiovascular:  Normal rate and regular rhythm.           No murmur heard.  Pulmonary/Chest: " Breath sounds normal. No respiratory distress. She has no wheezes.   Abdominal: Abdomen is soft. Bowel sounds are normal. She exhibits no distension. There is no abdominal tenderness.   Musculoskeletal:         General: No edema. Normal range of motion.      Cervical back: Normal range of motion and neck supple.     Neurological: No cranial nerve deficit. GCS score is 15. GCS eye subscore is 4. GCS verbal subscore is 5. GCS motor subscore is 6.   Alert. Oriented to person only. Slow to respond, but responds appropriately. Follows commands.    Skin: Skin is warm and dry. Capillary refill takes less than 2 seconds.   Psychiatric:   tearful         ED Course   Procedures  Labs Reviewed   COMPREHENSIVE METABOLIC PANEL - Abnormal; Notable for the following components:       Result Value    Chloride 111 (*)     Carbon Dioxide 20 (*)     Calcium Level Total 7.9 (*)     Protein Total 5.7 (*)     Albumin Level 2.8 (*)     Albumin/Globulin Ratio 1.0 (*)     Bilirubin Total 2.3 (*)     Aspartate Aminotransferase 55 (*)     All other components within normal limits   B-TYPE NATRIURETIC PEPTIDE - Abnormal; Notable for the following components:    Natriuretic Peptide 955.8 (*)     All other components within normal limits   TROPONIN I - Abnormal; Notable for the following components:    Troponin-I 0.151 (*)     All other components within normal limits   CBC WITH DIFFERENTIAL - Abnormal; Notable for the following components:    WBC 2.28 (*)     RBC 3.48 (*)     Hgb 11.3 (*)     Hct 32.3 (*)     MCH 32.5 (*)     RDW 17.2 (*)     Platelet 41 (*)     Neut # 1.05 (*)     All other components within normal limits   CBC W/ AUTO DIFFERENTIAL    Narrative:     The following orders were created for panel order CBC auto differential.  Procedure                               Abnormality         Status                     ---------                               -----------         ------                     CBC with Differential[[id number]]         Abnormal            Final result                 Please view results for these tests on the individual orders.          Imaging Results              X-Ray Chest 1 View (Final result)  Result time 08/08/23 12:40:34      Final result by Nik Miguel MD (08/08/23 12:40:34)                   Impression:      Small left pleural effusion, new from the prior study      Electronically signed by: Nik Miguel MD  Date:    08/08/2023  Time:    12:40               Narrative:    EXAMINATION:  XR CHEST 1 VIEW    CLINICAL HISTORY:  Shortness of breath    COMPARISON:  08/07/2023    FINDINGS:  Single view of the chest shows new small left pleural fluid.  No pneumothorax or lobar type consolidation.  Aorta is partially calcified.  Heart is upper normal in size.                                    X-Rays:   Independently Interpreted Readings:   Chest X-Ray: Normal heart size.  No infiltrates.  No acute abnormalities.     Medications   acetaminophen tablet 1,000 mg (0 mg Oral Hold 8/8/23 1200)   remdesivir 200 mg in sodium chloride 0.9% 250 mL infusion (has no administration in time range)     Followed by   remdesivir 100 mg in sodium chloride 0.9% 100 mL infusion (has no administration in time range)   furosemide injection 40 mg (has no administration in time range)   dexAMETHasone injection 8 mg (8 mg Intravenous Given 8/8/23 1159)     Medical Decision Making:   History:   Old Records Summarized: other records.       <> Summary of Records: ED records from 8/7/23: diagnosed with COVID yesterday, at which time she was mildly neutropenic, blood in urine, negative chest x-ray. O2 saturation 97%.  A&Ox3  Differential Diagnosis:   Sepsis, COVID PNA, UTI  Clinical Tests:   Lab Tests: Ordered and Reviewed  Radiological Study: Ordered and Reviewed                          Clinical Impression:   Final diagnoses:  [R06.02] SOB (shortness of breath)  [U07.1] COVID (Primary)  [R41.82] Altered mental status, unspecified altered mental  status type  [R79.89] Elevated brain natriuretic peptide (BNP) level        ED Disposition Condition    Admit Stable                Jackeline Kiser MD  Resident  08/08/23 0888

## 2023-08-09 PROBLEM — R41.82 ALTERED MENTAL STATUS: Status: ACTIVE | Noted: 2023-08-09

## 2023-08-09 LAB
ABS NEUT (OLG): 1.86 X10(3)/MCL (ref 2.1–9.2)
ACANTHOCYTES (OLG): ABNORMAL
ALBUMIN SERPL-MCNC: 1.3 G/DL (ref 3.4–4.8)
ALBUMIN SERPL-MCNC: 2.4 G/DL (ref 3.4–4.8)
ALBUMIN/GLOB SERPL: 0.9 RATIO (ref 1.1–2)
ALBUMIN/GLOB SERPL: 1 RATIO (ref 1.1–2)
ALLENS TEST BLOOD GAS (OHS): YES
ALP SERPL-CCNC: 25 UNIT/L (ref 40–150)
ALP SERPL-CCNC: 45 UNIT/L (ref 40–150)
ALT SERPL-CCNC: 17 UNIT/L (ref 0–55)
ALT SERPL-CCNC: 31 UNIT/L (ref 0–55)
AMMONIA PLAS-MSCNC: 27.8 UMOL/L (ref 18–72)
ANISOCYTOSIS BLD QL SMEAR: ABNORMAL
APTT PPP: 32.8 SECONDS (ref 23.2–33.7)
AST SERPL-CCNC: 41 UNIT/L (ref 5–34)
AST SERPL-CCNC: 74 UNIT/L (ref 5–34)
BASE EXCESS BLD CALC-SCNC: -3 MMOL/L (ref -2–2)
BASOPHILS # BLD AUTO: 0 X10(3)/MCL
BASOPHILS NFR BLD AUTO: 0 %
BILIRUB SERPL-MCNC: 0.9 MG/DL
BILIRUB SERPL-MCNC: 1.5 MG/DL
BLOOD GAS SAMPLE TYPE (OHS): ABNORMAL
BUN SERPL-MCNC: 14.8 MG/DL (ref 9.8–20.1)
BUN SERPL-MCNC: 24.3 MG/DL (ref 9.8–20.1)
BURR CELLS (OLG): ABNORMAL
CA-I BLD-SCNC: 1.09 MMOL/L (ref 1.12–1.23)
CALCIUM SERPL-MCNC: 4.1 MG/DL (ref 8.4–10.2)
CALCIUM SERPL-MCNC: 7.5 MG/DL (ref 8.4–10.2)
CHLORIDE SERPL-SCNC: 113 MMOL/L (ref 98–107)
CHLORIDE SERPL-SCNC: 128 MMOL/L (ref 98–107)
CO2 BLDA-SCNC: 20.5 MMOL/L
CO2 SERPL-SCNC: 13 MMOL/L (ref 23–31)
CO2 SERPL-SCNC: 21 MMOL/L (ref 23–31)
COHGB MFR BLDA: 2.4 % (ref 0.5–1.5)
CREAT SERPL-MCNC: 0.41 MG/DL (ref 0.55–1.02)
CREAT SERPL-MCNC: 0.77 MG/DL (ref 0.55–1.02)
DRAWN BY BLOOD GAS (OHS): ABNORMAL
EOSINOPHIL # BLD AUTO: 0 X10(3)/MCL (ref 0–0.9)
EOSINOPHIL NFR BLD AUTO: 0 %
ERYTHROCYTE [DISTWIDTH] IN BLOOD BY AUTOMATED COUNT: 17 % (ref 11.5–17)
ERYTHROCYTE [DISTWIDTH] IN BLOOD BY AUTOMATED COUNT: 17.1 % (ref 11.5–17)
FIBRINOGEN PPP-MCNC: 211 MG/DL (ref 210–463)
FSP PPP-MCNC: <5 MCG/ML
GFR SERPLBLD CREATININE-BSD FMLA CKD-EPI: >60 MLS/MIN/1.73/M2
GFR SERPLBLD CREATININE-BSD FMLA CKD-EPI: >60 MLS/MIN/1.73/M2
GLOBULIN SER-MCNC: 1.4 GM/DL (ref 2.4–3.5)
GLOBULIN SER-MCNC: 2.5 GM/DL (ref 2.4–3.5)
GLUCOSE SERPL-MCNC: 141 MG/DL (ref 82–115)
GLUCOSE SERPL-MCNC: 85 MG/DL (ref 82–115)
HCO3 BLDA-SCNC: 19.7 MMOL/L (ref 22–26)
HCT VFR BLD AUTO: 27.7 % (ref 37–47)
HCT VFR BLD AUTO: 31.3 % (ref 37–47)
HGB BLD-MCNC: 11.2 G/DL (ref 12–16)
HGB BLD-MCNC: 9.5 G/DL (ref 12–16)
IMM GRANULOCYTES # BLD AUTO: 0.01 X10(3)/MCL (ref 0–0.04)
IMM GRANULOCYTES NFR BLD AUTO: 0.5 %
INR PPP: 1.8
INSTRUMENT WBC (OLG): 2 X10(3)/MCL
LYMPHOCYTES # BLD AUTO: 0.39 X10(3)/MCL (ref 0.6–4.6)
LYMPHOCYTES NFR BLD AUTO: 19.7 %
LYMPHOCYTES NFR BLD MANUAL: 0.08 X10(3)/MCL
LYMPHOCYTES NFR BLD MANUAL: 4 %
MACROCYTES BLD QL SMEAR: ABNORMAL
MAGNESIUM SERPL-MCNC: 1.8 MG/DL (ref 1.6–2.6)
MCH RBC QN AUTO: 32.3 PG (ref 27–31)
MCH RBC QN AUTO: 32.7 PG (ref 27–31)
MCHC RBC AUTO-ENTMCNC: 34.3 G/DL (ref 33–36)
MCHC RBC AUTO-ENTMCNC: 35.8 G/DL (ref 33–36)
MCV RBC AUTO: 91.3 FL (ref 80–94)
MCV RBC AUTO: 94.2 FL (ref 80–94)
METHGB MFR BLDA: 1.2 % (ref 0.4–1.5)
MONOCYTES # BLD AUTO: 0.28 X10(3)/MCL (ref 0.1–1.3)
MONOCYTES NFR BLD AUTO: 14.1 %
MONOCYTES NFR BLD MANUAL: 0.06 X10(3)/MCL (ref 0.1–1.3)
MONOCYTES NFR BLD MANUAL: 3 %
MRSA PCR SCRN (OHS): NOT DETECTED
NEUTROPHILS # BLD AUTO: 1.3 X10(3)/MCL (ref 2.1–9.2)
NEUTROPHILS NFR BLD AUTO: 65.7 %
NEUTROPHILS NFR BLD MANUAL: 93 %
NRBC BLD AUTO-RTO: 0 %
NRBC BLD AUTO-RTO: 0 %
O2 HB BLOOD GAS (OHS): 96 % (ref 94–97)
OXYHGB MFR BLDA: 10.7 G/DL (ref 12–16)
PCO2 BLDA: 27 MMHG (ref 35–45)
PH BLDA: 7.47 [PH] (ref 7.35–7.45)
PHOSPHATE SERPL-MCNC: 3.2 MG/DL (ref 2.3–4.7)
PLATELET # BLD AUTO: 27 X10(3)/MCL (ref 130–400)
PLATELET # BLD AUTO: 33 X10(3)/MCL (ref 130–400)
PLATELET # BLD EST: ABNORMAL 10*3/UL
PMV BLD AUTO: 10.5 FL (ref 7.4–10.4)
PMV BLD AUTO: 11.1 FL (ref 7.4–10.4)
PO2 BLDA: 83 MMHG (ref 80–100)
POIKILOCYTOSIS BLD QL SMEAR: ABNORMAL
POTASSIUM BLOOD GAS (OHS): 3.5 MMOL/L (ref 3.5–5)
POTASSIUM SERPL-SCNC: 1.8 MMOL/L (ref 3.5–5.1)
POTASSIUM SERPL-SCNC: 3.5 MMOL/L (ref 3.5–5.1)
PROT SERPL-MCNC: 2.7 GM/DL (ref 5.8–7.6)
PROT SERPL-MCNC: 4.9 GM/DL (ref 5.8–7.6)
PROTHROMBIN TIME: 21 SECONDS (ref 12.5–14.5)
RBC # BLD AUTO: 2.94 X10(6)/MCL (ref 4.2–5.4)
RBC # BLD AUTO: 3.43 X10(6)/MCL (ref 4.2–5.4)
RBC MORPH BLD: ABNORMAL
RET# (OHS): 0.05 (ref 0.02–0.08)
RETICULOCYTE COUNT AUTOMATED (OLG): 1.49 % (ref 1.1–2.1)
SAMPLE SITE BLOOD GAS (OHS): ABNORMAL
SAO2 % BLDA: 96.8 %
SODIUM BLOOD GAS (OHS): 135 MMOL/L (ref 137–145)
SODIUM SERPL-SCNC: 138 MMOL/L (ref 136–145)
SODIUM SERPL-SCNC: 145 MMOL/L (ref 136–145)
TROPONIN I SERPL-MCNC: 0.05 NG/ML (ref 0–0.04)
TROPONIN I SERPL-MCNC: 0.08 NG/ML (ref 0–0.04)
VIT B12 SERPL-MCNC: 1220 PG/ML (ref 213–816)
WBC # SPEC AUTO: 1.56 X10(3)/MCL (ref 4.5–11.5)
WBC # SPEC AUTO: 1.98 X10(3)/MCL (ref 4.5–11.5)

## 2023-08-09 PROCEDURE — 85730 THROMBOPLASTIN TIME PARTIAL: CPT | Performed by: INTERNAL MEDICINE

## 2023-08-09 PROCEDURE — 27000207 HC ISOLATION

## 2023-08-09 PROCEDURE — 80053 COMPREHEN METABOLIC PANEL: CPT | Performed by: PHYSICIAN ASSISTANT

## 2023-08-09 PROCEDURE — 87641 MR-STAPH DNA AMP PROBE: CPT | Performed by: INTERNAL MEDICINE

## 2023-08-09 PROCEDURE — 85027 COMPLETE CBC AUTOMATED: CPT | Performed by: PHYSICIAN ASSISTANT

## 2023-08-09 PROCEDURE — 82803 BLOOD GASES ANY COMBINATION: CPT

## 2023-08-09 PROCEDURE — 83735 ASSAY OF MAGNESIUM: CPT | Performed by: INTERNAL MEDICINE

## 2023-08-09 PROCEDURE — 85610 PROTHROMBIN TIME: CPT | Performed by: INTERNAL MEDICINE

## 2023-08-09 PROCEDURE — 80053 COMPREHEN METABOLIC PANEL: CPT | Performed by: INTERNAL MEDICINE

## 2023-08-09 PROCEDURE — 63600175 PHARM REV CODE 636 W HCPCS: Performed by: INTERNAL MEDICINE

## 2023-08-09 PROCEDURE — 25000003 PHARM REV CODE 250: Performed by: INTERNAL MEDICINE

## 2023-08-09 PROCEDURE — 99900035 HC TECH TIME PER 15 MIN (STAT)

## 2023-08-09 PROCEDURE — 21400001 HC TELEMETRY ROOM

## 2023-08-09 PROCEDURE — 36600 WITHDRAWAL OF ARTERIAL BLOOD: CPT

## 2023-08-09 PROCEDURE — 85045 AUTOMATED RETICULOCYTE COUNT: CPT | Performed by: INTERNAL MEDICINE

## 2023-08-09 PROCEDURE — 82607 VITAMIN B-12: CPT | Performed by: INTERNAL MEDICINE

## 2023-08-09 PROCEDURE — 99222 PR INITIAL HOSPITAL CARE,LEVL II: ICD-10-PCS | Mod: ,,, | Performed by: SPECIALIST

## 2023-08-09 PROCEDURE — 25500020 PHARM REV CODE 255: Performed by: INTERNAL MEDICINE

## 2023-08-09 PROCEDURE — 84484 ASSAY OF TROPONIN QUANT: CPT | Performed by: PHYSICIAN ASSISTANT

## 2023-08-09 PROCEDURE — 84100 ASSAY OF PHOSPHORUS: CPT | Performed by: INTERNAL MEDICINE

## 2023-08-09 PROCEDURE — 82140 ASSAY OF AMMONIA: CPT | Performed by: INTERNAL MEDICINE

## 2023-08-09 PROCEDURE — 85362 FIBRIN DEGRADATION PRODUCTS: CPT | Performed by: INTERNAL MEDICINE

## 2023-08-09 PROCEDURE — 85384 FIBRINOGEN ACTIVITY: CPT | Performed by: INTERNAL MEDICINE

## 2023-08-09 PROCEDURE — 99222 1ST HOSP IP/OBS MODERATE 55: CPT | Mod: ,,, | Performed by: SPECIALIST

## 2023-08-09 PROCEDURE — 85027 COMPLETE CBC AUTOMATED: CPT | Performed by: INTERNAL MEDICINE

## 2023-08-09 PROCEDURE — 85060 BLOOD SMEAR INTERPRETATION: CPT | Performed by: INTERNAL MEDICINE

## 2023-08-09 PROCEDURE — P9045 ALBUMIN (HUMAN), 5%, 250 ML: HCPCS | Mod: JZ,JG | Performed by: INTERNAL MEDICINE

## 2023-08-09 RX ORDER — POTASSIUM CHLORIDE 14.9 MG/ML
40 INJECTION INTRAVENOUS ONCE
Status: COMPLETED | OUTPATIENT
Start: 2023-08-09 | End: 2023-08-09

## 2023-08-09 RX ORDER — HYDROCORTISONE 25 MG/G
CREAM TOPICAL 2 TIMES DAILY
Status: DISCONTINUED | OUTPATIENT
Start: 2023-08-09 | End: 2023-08-14 | Stop reason: HOSPADM

## 2023-08-09 RX ORDER — ALBUMIN HUMAN 50 G/1000ML
25 SOLUTION INTRAVENOUS ONCE
Status: COMPLETED | OUTPATIENT
Start: 2023-08-09 | End: 2023-08-09

## 2023-08-09 RX ORDER — INDOMETHACIN 25 MG/1
50 CAPSULE ORAL ONCE
Status: COMPLETED | OUTPATIENT
Start: 2023-08-09 | End: 2023-08-09

## 2023-08-09 RX ORDER — VANCOMYCIN HCL IN 5 % DEXTROSE 1G/250ML
20 PLASTIC BAG, INJECTION (ML) INTRAVENOUS ONCE
Status: DISCONTINUED | OUTPATIENT
Start: 2023-08-09 | End: 2023-08-09

## 2023-08-09 RX ORDER — POTASSIUM CHLORIDE 14.9 MG/ML
40 INJECTION INTRAVENOUS ONCE
Status: DISCONTINUED | OUTPATIENT
Start: 2023-08-09 | End: 2023-08-09

## 2023-08-09 RX ADMIN — MUPIROCIN: 20 OINTMENT TOPICAL at 09:08

## 2023-08-09 RX ADMIN — ALBUMIN HUMAN 25 G: 50 SOLUTION INTRAVENOUS at 11:08

## 2023-08-09 RX ADMIN — IOPAMIDOL 100 ML: 755 INJECTION, SOLUTION INTRAVENOUS at 12:08

## 2023-08-09 RX ADMIN — MUPIROCIN: 20 OINTMENT TOPICAL at 01:08

## 2023-08-09 RX ADMIN — HYDROCORTISONE 2.5%: 25 CREAM TOPICAL at 10:08

## 2023-08-09 RX ADMIN — ALPRAZOLAM 1 MG: 0.5 TABLET ORAL at 09:08

## 2023-08-09 RX ADMIN — PIPERACILLIN AND TAZOBACTAM 4.5 G: 4; .5 INJECTION, POWDER, LYOPHILIZED, FOR SOLUTION INTRAVENOUS; PARENTERAL at 10:08

## 2023-08-09 RX ADMIN — POTASSIUM CHLORIDE 40 MEQ: 200 INJECTION, SOLUTION INTRAVENOUS at 09:08

## 2023-08-09 RX ADMIN — SODIUM BICARBONATE 50 MEQ: 84 INJECTION, SOLUTION INTRAVENOUS at 09:08

## 2023-08-09 RX ADMIN — DOXYCYCLINE: 100 INJECTION, POWDER, LYOPHILIZED, FOR SOLUTION INTRAVENOUS at 11:08

## 2023-08-09 RX ADMIN — SODIUM CHLORIDE 500 ML: 9 INJECTION, SOLUTION INTRAVENOUS at 09:08

## 2023-08-09 RX ADMIN — PRAVASTATIN SODIUM 40 MG: 40 TABLET ORAL at 09:08

## 2023-08-09 RX ADMIN — LEVOTHYROXINE SODIUM 75 MCG: 25 TABLET ORAL at 06:08

## 2023-08-09 RX ADMIN — PIPERACILLIN AND TAZOBACTAM 4.5 G: 4; .5 INJECTION, POWDER, LYOPHILIZED, FOR SOLUTION INTRAVENOUS; PARENTERAL at 09:08

## 2023-08-09 RX ADMIN — REMDESIVIR 100 MG: 100 INJECTION, POWDER, LYOPHILIZED, FOR SOLUTION INTRAVENOUS at 03:08

## 2023-08-09 RX ADMIN — SODIUM BICARBONATE: 84 INJECTION, SOLUTION INTRAVENOUS at 09:08

## 2023-08-09 RX ADMIN — SODIUM CHLORIDE: 9 INJECTION, SOLUTION INTRAVENOUS at 01:08

## 2023-08-09 NOTE — HPI
82-year-old female PMH HTN, HLD, hypothyroidism, hepatocellular carcinoma s/p radiation at [facility], Liver disease with portal hypertension, esophageal and gastric varices, hepatitis-C where she was treated for anxiety, and R BBB who presented to ED on 08/08 due to altered mental status.  Patient reportedly confused and lethargic, symptoms began the day PTA.  Patient was also seen in ED the day PTA for fever and was diagnosed with COVID.  Patient was without supplemental oxygen requirements.    CT head without negative for acute intracranial abnormalities.  Labs significant for WBC 1.98, H/H 11.2/31.3, platelets 33, .8, COVID positive, and otherwise unremarkable.    Of note, pt recently had MRI brain w w/o last June that revealed nonenhancing scattered and confluent T2 hyperintense foci without restricted diffusion in the bilateral supratentorial white matter.  There is increased T1 shortening with some is decreased T2 signal in the bilateral globus palate I greater than putamen in both lenticular nuclei. Pt was referred to neurology (dr. Mulligan) o/p to review MRI brain.

## 2023-08-09 NOTE — PROGRESS NOTES
[facility]  Hospital Medicine Progress Note        Chief Complaint: Inpatient Follow-up for  altered mental status.    HPI: Tessa Dozier is a 82 y.o. female with a past medical history of essential hypertension, hyperlipidemia, and hypothyroidism presented to [facility] on 8/8/2023 for altered mental status.   reported to ED that patient has had increased confusion and lethargy since yesterday. Patient was seen in ED yesterday with primary complaint of weakness and fever and was diagnosed with COVID.  History is limited secondary to patient's medical condition and majority of the history was obtained from review of the medical record.  Initial vital signs in ED were /63, pulse 89, respirations 14, temperature 37.8° C, and SpO2 94% on room air.  Labs revealed WBC 2.28, RBC 3.48, hemoglobin 11.3, hematocrit 32.3, platelets 41, chloride 111, CO2 20, .8, and troponin 0.151.  Chest x-ray revealed small left pleural effusion. Patient was given IV Remdesivir 200 mg, IV dexamethasone 8 mg, and IV lasix 40 mg in ED. Patient was admitted to hospital medicine service for further medical management.     Interval Hx:   MD was informed that the Patient's blood pressure dropped this morning to 80s systolic, and also was noted to have critical lab values.    Patient was seen and examined at bedside, appears lethargic, alert and oriented x2, no family at bedside when the MD evaluated the patient.  Patient denies any chills/cough/chest pain/difficulty breathing/any pain anywhere.  Staff reports patient having diarrhea    Chart was reviewed, T-max of 100°, blood pressure improved with fluid resuscitation, was noted to have low platelet and WBC, bicarb was 13 and potassium was 1.8 on the 1st set of labs.  The 2nd set showed bicarb of 21 and potassium of 3.5    Objective/physical exam:  General: In no acute distress, afebrile  Chest: Clear to auscultation bilaterally  Heart: RRR, +S1,  S2, no appreciable murmur  Abdomen: Soft, nontender, BS +  MSK: Warm, no lower extremity edema, no clubbing or cyanosis  Neurologic: Alert and oriented x2    VITAL SIGNS: 24 HRS MIN & MAX LAST   Temp  Min: 96.7 °F (35.9 °C)  Max: 100 °F (37.8 °C) 96.7 °F (35.9 °C)   BP  Min: 82/47  Max: 118/63 117/67   Pulse  Min: 58  Max: 89  65   Resp  Min: 14  Max: 20 14   SpO2  Min: 93 %  Max: 98 % 98 %     I have reviewed the following labs:    Recent Labs   Lab 08/08/23  1156 08/09/23  0601 08/09/23  0818   WBC 2.28* 1.56* 1.98*   RBC 3.48* 2.94* 3.43*   HGB 11.3* 9.5* 11.2*   HCT 32.3* 27.7* 31.3*   MCV 92.8 94.2* 91.3   MCH 32.5* 32.3* 32.7*   MCHC 35.0 34.3 35.8   RDW 17.2* 17.1* 17.0   PLT 41* 27* 33*   MPV 10.1 11.1* 10.5*       Recent Labs   Lab 08/07/23  1851 08/08/23  1156 08/09/23  0601 08/09/23  0818 08/09/23  0830    136 145 138  --    K 4.1 3.9 1.8* 3.5  --    CO2 20* 20* 13* 21*  --    BUN 19.0 19.6 14.8 24.3*  --    CREATININE 0.78 0.91 0.41* 0.77  --    CALCIUM 8.4 7.9* 4.1* 7.5*  --    PH  --   --   --   --  7.470*   MG 1.70  --   --  1.80  --    ALBUMIN 3.0* 2.8* 1.3* 2.4*  --    ALKPHOS 61 54 25* 45  --    ALT 21 24 17 31  --    AST 38* 55* 41* 74*  --    BILITOT 3.0* 2.3* 0.9 1.5  --           Microbiology Results (last 7 days)       Procedure Component Value Units Date/Time    Stool Culture [[id number]]     Order Status: Sent Specimen: Stool     Blood Culture #1 **CANNOT BE ORDERED STAT** [[id number]] Collected: 08/08/23 1438    Order Status: Resulted Specimen: Blood from Arm, Left Updated: 08/08/23 1450    Blood Culture #2 **CANNOT BE ORDERED STAT** [[id number]] Collected: 08/08/23 1438    Order Status: Resulted Specimen: Blood from Arm, Right Updated: 08/08/23 1450             See below for Radiology    Scheduled Med:   albumin human 5%  25 g Intravenous Once    ALPRAZolam  1 mg Oral QHS    levothyroxine  75 mcg Oral Before breakfast    mupirocin   Nasal BID    piperacillin-tazobactam (Zosyn) IV (PEDS  and ADULTS) (extended infusion is not appropriate)  4.5 g Intravenous Q8H    pravastatin  40 mg Oral Daily    remdesivir infusion  100 mg Intravenous Daily    vancomycin (VANCOCIN) IV (PEDS and ADULTS)  20 mg/kg (Dosing Weight) Intravenous Once        Continuous Infusions:   sodium chloride 0.9% 125 mL/hr at 08/09/23 0103    sodium bicarbonate 150 mEq in dextrose 5 % (D5W) 1,000 mL infusion 75 mL/hr at 08/09/23 0920        PRN Meds:      Assessment/Plan:    Acute COVID-19 infection, + 08/07/2023   Sepsis secondary to above  NSTEMI, likely type 2   Acute metabolic encephalopathy  Leukopenia   Thrombocytopenia   Normocytic anemia  Metabolic acidosis  Essential hypertension  Hyperlipidemia  Hypothyroidism     Plan:  IV Remdesivir 100 mg daily ,Continue supportive care, supplemental oxygen as needed, antitussives/analgesics p.r.n.,Monitor inflammatory markers, may not need steroids, cannot anticoagulate due to low platelets  Doxycycline and Zosyn were added coverage as she was noted to have drop in blood pressure to 80s systolic this morning requiring IV bolus, follow up on the respiratory culture, MRSA PCR,blood cultures, WBC, fever curve.  Patient having GI symptoms?   Follow-up on abdominal imaging, follow up on stool cultures  Cardiology consulted, appreciate recommendations , recommended to follow up on echocardiogram, recommending to keep k above 4, Mag above 2  CT head-chronic age-related changes, no acute process, check ammonia level, neurology recommendations would be appreciated in the setting of prior abnormal MRI  Monitor CBC, likely secondary to current infection, if further drop, may consider Hematology evaluation  IV fluids changed to sodium bicarb, monitor BMP, replete electrolytes per protocol  Continue appropriate home medications , avoid antihypertensives      Critical care Time Spent>35 min         VTE prophylaxis:  SCDs      Anticipated discharge and Disposition:   To be decided      All diagnosis  and differential diagnosis have been reviewed; assessment and plan has been documented; I have personally reviewed the labs and test results that are presently available; I have reviewed the patients medication list; I have reviewed the consulting providers response and recommendations. I have reviewed or attempted to review medical records based upon their availability    All of the patient's questions have been  addressed and answered. Patient's is agreeable to the above stated plan. I will continue to monitor closely and make adjustments to medical management as needed.  _____________________________________________________________________    Nutrition Status:    Radiology:  I have personally reviewed the following imaging and agree with the radiologist.     Echo    Left Ventricle: The left ventricle is normal in size. Normal wall   thickness. Normal wall motion. There is normal systolic function with a   visually estimated ejection fraction of 55 - 60%.    Right Ventricle: Normal right ventricular cavity size. Systolic   function is normal.    Aortic Valve: The aortic valve is a trileaflet valve.    IVC/SVC: Normal venous pressure at 3 mmHg.    Breast implants noted.  CTA Chest Non-Coronary (PE Studies)  Narrative: EXAMINATION:  CTA CHEST NON CORONARY (PE STUDIES)    CLINICAL HISTORY:  Pulmonary embolism (PE) suspected, positive D-dimer;    TECHNIQUE:  Helical-acquisition CT images were obtained prior to and following the intravenous administration of iodine-based contrast media.    The post-contrast images were timed to coincide with arterial opacification for purpose of CT angiography.    Multiplanar reconstructions, to include MIP and volume-rendered (3D) images, were accomplished by the CT technologist at a separate workstation and pushed to PACS for physician review.    Total DLP (mGy-cm) 116 (value may include radiation due to concomitantly performed CT imaging)    Automated tube current modulation and/or  weight-based exposure dosing is utilized, when appropriate, to reach lowest reasonably achievable exposure rate.    COMPARISON:  CT chest dated 09/26/2018    FINDINGS:  The heart is normal in size.  The RV to LV ratio is less than 1.  There is no pericardial effusion.  There is no pulmonary embolism identified.    There are trace bilateral pleural effusions with dependent atelectasis.  There is no pneumothorax.    The gallbladder has been surgically resected.  Ascites is noted.    There is no acute bony abnormality.  Impression: 1. No pulmonary embolism identified.  2. Trace bilateral pleural effusions with dependent atelectasis.    Electronically signed by: Desiree Zepeda  Date:    08/08/2023  Time:    16:55  CT Head Without Contrast  Narrative: EXAMINATION:  CT HEAD WITHOUT CONTRAST    CLINICAL HISTORY:  Mental status change, unknown cause;    TECHNIQUE:  Multiple axial images were obtained from the base of the brain to the vertex without contrast administration.  Sagittal and coronal reconstructions were performed..Automatic exposure control is utilized to reduce patient radiation exposure.    COMPARISON:  MRI of the brain from 06/28/2023    FINDINGS:  There is no intracranial mass or lesion seen.  No hemorrhage is seen.  No acute infarct is seen.  There is some cerebral atrophy seen.  There is some compensatory ventricular dilatation and periventricular white matter changes consistent with the patient's age.  Calvarium is intact.  The posterior fossa is unremarkable..  The visualized portions of the paranasal sinuses show no acute abnormality.  Impression: Chronic age-related changes.  No acute process    Electronically signed by: Reena Jung  Date:    08/08/2023  Time:    13:12  X-Ray Chest 1 View  Narrative: EXAMINATION:  XR CHEST 1 VIEW    CLINICAL HISTORY:  Shortness of breath    COMPARISON:  08/07/2023    FINDINGS:  Single view of the chest shows new small left pleural fluid.  No pneumothorax or  lobar type consolidation.  Aorta is partially calcified.  Heart is upper normal in size.  Impression: Small left pleural effusion, new from the prior study    Electronically signed by: Nik Miguel MD  Date:    08/08/2023  Time:    12:40      Laura Orr MD   08/09/2023

## 2023-08-09 NOTE — NURSING
Artificial Intelligence Notification  [facility]  [address] [place] [address]  Phone: [phone]     This documentation was triggered by an Artificial Intelligence Notification.     Admit Date: 2023   LOS: 1  Code Status: Full Code  : 1940  Age: 82 y.o.  Weight:   Wt Readings from Last 1 Encounters:   23 49.7 kg (109 lb 9.1 oz)        Sex: female  Bed: 431/431 A  MRN: [id number]  Attending Physician: Bebo Hanson MD     Date of Alert: 2023  Time AI Alert Received: 730             Vitals:    23 0758   BP: 102/69   Pulse: (!) 58   Resp:    Temp:        Artificial Intelligence alert discussed with Provider:     Name: Dr. Apple   Date/Time of Provider Notification: 2023 - 745      Patient Condition: Patient seen at bedside. Spoke to nurses - notified them of potassium 1.8/hypotension. Dr. Moreira notified of potassium, WBC, hypotension. Ordered 500mL of NS and 40 IV potassium. Rechecked BP - 102/64. Patient is confused but this has been since admission. Patient able to tell me birth date and name and location. Bolus started and potassium started. Labs ordered for redraw. Will continue to monitor.

## 2023-08-09 NOTE — PROGRESS NOTES
"Pharmacokinetic Initial Assessment: IV Vancomycin    Assessment/Plan:    Initiate intravenous vancomycin with loading dose of 1000 mg once with subsequent doses when random concentrations are less than 20 mcg/mL  Desired empiric serum trough concentration is 15 to 20 mcg/mL  Draw vancomycin random level on 08/10 at 0430.  Will initiate vancomycin by pulse-dosing due to age and poor renal function.  Pharmacy will continue to follow and monitor vancomycin.      Please contact pharmacy at extension [phone] with any questions regarding this assessment.     Thank you for the consult,   Tino Trammell, PharmD       Patient brief summary:  Tessa Dozier is a 82 y.o. female initiated on antimicrobial therapy with IV Vancomycin for treatment of suspected sepsis    Drug Allergies:   Review of patient's allergies indicates:   Allergen Reactions    Promethazine      Other reaction(s): hallucinates    Azo-sulfisoxazole      Other reaction(s): rash    Ciprofloxacin      Other reaction(s): hives    Linaclotide      Other reaction(s): "feels awful"       Actual Body Weight:   49.7 kg    Renal Function:   Estimated Creatinine Clearance: 43.4 mL/min (based on SCr of 0.77 mg/dL).,     Dialysis Method (if applicable):  N/A    CBC (last 72 hours):  Recent Labs   Lab Result Units 08/07/23 1851 08/08/23 1156 08/09/23  0601 08/09/23  0818   WBC x10(3)/mcL 2.14* 2.28* 1.56* 1.98*   Hgb g/dL 11.6* 11.3* 9.5* 11.2*   Hct % 32.2* 32.3* 27.7* 31.3*   Platelet x10(3)/mcL 47* 41* 27* 33*   Mono % % 16.8 25.9  --  14.1   Eos % % 1.9 0.0  --  0.0   Basophil % % 0.9 0.9  --  0.0       Metabolic Panel (last 72 hours):  Recent Labs   Lab Result Units 08/07/23 1851 08/07/23 2013 08/08/23 1156 08/09/23  0601 08/09/23  0818   Sodium Level mmol/L 137  --  136 145 138   Potassium Level mmol/L 4.1  --  3.9 1.8* 3.5   Chloride mmol/L 112*  --  111* 128* 113*   Carbon Dioxide mmol/L 20*  --  20* 13* 21*   Glucose Level mg/dL 113  --  104 85 141*   Glucose, " "UA   --  Negative  --   --   --    Blood Urea Nitrogen mg/dL 19.0  --  19.6 14.8 24.3*   Creatinine mg/dL 0.78  --  0.91 0.41* 0.77   Albumin Level g/dL 3.0*  --  2.8* 1.3* 2.4*   Bilirubin Total mg/dL 3.0*  --  2.3* 0.9 1.5   Alkaline Phosphatase unit/L 61  --  54 25* 45   Aspartate Aminotransferase unit/L 38*  --  55* 41* 74*   Alanine Aminotransferase unit/L 21  --  24 17 31   Magnesium Level mg/dL 1.70  --   --   --  1.80   Phosphorus Level mg/dL 2.3  --   --   --  3.2       Drug levels (last 3 results):  No results for input(s): "VANCOMYCINRA", "VANCORANDOM", "VANCOMYCINPE", "VANCOPEAK", "VANCOMYCINTR", "VANCOTROUGH" in the last 72 hours.    Microbiologic Results:  Microbiology Results (last 7 days)       Procedure Component Value Units Date/Time    Blood Culture #1 **CANNOT BE ORDERED STAT** [[id number]] Collected: 08/08/23 1438    Order Status: Resulted Specimen: Blood from Arm, Left Updated: 08/08/23 1450    Blood Culture #2 **CANNOT BE ORDERED STAT** [[id number]] Collected: 08/08/23 1438    Order Status: Resulted Specimen: Blood from Arm, Right Updated: 08/08/23 1450            "

## 2023-08-09 NOTE — SUBJECTIVE & OBJECTIVE
"Past Medical History:   Diagnosis Date    Hypercholesteremia     Hypertension     Thyroid disease        Past Surgical History:   Procedure Laterality Date    ear lobe surgery      EYE SURGERY  2018       Review of patient's allergies indicates:   Allergen Reactions    Promethazine      Other reaction(s): hallucinates    Azo-sulfisoxazole      Other reaction(s): rash    Ciprofloxacin      Other reaction(s): hives    Linaclotide      Other reaction(s): "feels awful"       Current Neurological Medications:     No current facility-administered medications on file prior to encounter.     Current Outpatient Medications on File Prior to Encounter   Medication Sig    ALPRAZolam (XANAX) 2 MG Tab Take 1 tablet (2 mg total) by mouth every 24 hours as needed (anxiety).    carvediloL (COREG) 3.125 MG tablet TAKE 1 TABLET BY MOUTH TWICE A DAY    levothyroxine (SYNTHROID) 50 MCG tablet TAKE 1 TABLET BY MOUTH EVERY TUESDAY AND THURSDAY    levothyroxine (SYNTHROID) 75 MCG tablet Take 1 tablet (75 mcg total) by mouth before breakfast.    pravastatin (PRAVACHOL) 40 MG tablet Take 40 mg by mouth once daily.    vibegron 75 mg Tab Take 1 tablet by mouth once daily.     Family History    None       Tobacco Use    Smoking status: Never     Passive exposure: Never    Smokeless tobacco: Never   Substance and Sexual Activity    Alcohol use: Never    Drug use: Never    Sexual activity: Not Currently     Review of Systems  Objective:     Vital Signs (Most Recent):  Temp: 97.4 °F (36.3 °C) (08/09/23 0827)  Pulse: 60 (08/09/23 0827)  Resp: 18 (08/09/23 0827)  BP: 104/63 (08/09/23 0827)  SpO2: 97 % (08/09/23 0827) Vital Signs (24h Range):  Temp:  [97.4 °F (36.3 °C)-100 °F (37.8 °C)] 97.4 °F (36.3 °C)  Pulse:  [58-89] 60  Resp:  [14-20] 18  SpO2:  [93 %-97 %] 97 %  BP: ()/(47-71) 104/63     Weight: 49.7 kg (109 lb 9.1 oz)  Body mass index is 19.41 kg/m².     Physical Exam      Defer to MD for full neurologic exam    Significant Labs: "   Recent Lab Results  (Last 5 results in the past 24 hours)        08/09/23  0830   08/09/23  0818   08/09/23  0601   08/09/23  0002   08/08/23  1805        Albumin/Globulin Ratio   1.0   0.9           Albumin   2.4   1.3           Alkaline Phosphatase   45   25           Allens Test Yes               ALT   31   17           Ao peak akiko               Ao VTI               AST   74   41           AV valve area               JAMEL by Velocity Ratio               AV mean gradient               AV index (prosthetic)               AV peak gradient               AV Velocity Ratio               Baso #   0.00             Basophil %   0.0             BILIRUBIN TOTAL   1.5   0.9           BSA               BUN   24.3   14.8           Calcium   7.5   4.1  Comment: Critical Result called and verified by verbal readback to: Tarsha Lebron on 08/09/2023 at 07:23. Reported by [id number].           Ionized Calcium 1.09               Chloride   113   128  Comment: Critical Result called and verified by verbal readback to: Tarsha Lebron on 08/09/2023 at 07:23. Reported by [id number].           CO2   21   13           Creatinine   0.77   0.41           Drawn by sd rrt               E/A ratio               E/E' ratio               eGFR   >60   >60           Eos #   0.00             Eosinophil %   0.0             E wave deceleration time               Globulin, Total   2.5   1.4           Glucose   141   85           Hematocrit   31.3   27.7           Hemoglobin   11.2   9.5           Immature Grans (Abs)   0.01             Immature Granulocytes   0.5             LA size               LVOT area               LV LATERAL E/E' RATIO               LV SEPTAL E/E' RATIO               Left Ventricular Outflow Tract Mean Gradient               Left Ventricular Outflow Tract Mean Velocity               LVOT diameter               LVOT peak akiko               LVOT stroke volume               LVOT peak VTI               Lymph #   0.39             LYMPH  %   19.7             Magnesium   1.80             MCH   32.7   32.3           MCHC   35.8   34.3           MCV   91.3   94.2           Mean e'               Mono #   0.28             Mono %   14.1             MPV   10.5   11.1           MV valve area p 1/2 method               MV valve area by continuity eq               MV mean gradient               MV peak gradient               MV Peak A Eugene               MV Peak E Eugene               MV stenosis pressure 1/2 time               MV VTI               Neut #   1.30             Neut %   65.7             nRBC   0.0   0.0           O2 Hb, Blood Gas 96.0               Phosphorus   3.2             Platelets   33   27           Base Excess, Blood gas -3.00               CO Hgb 2.4               POC HCO3 19.7               Met Hgb 1.2               POC PCO2 27.0               POC PH 7.470               POC PO2 83.0               Potassium, Blood Gas 3.5               Sodium, Blood Gas 135               Potassium   3.5   1.8  Comment: Critical Result called and verified by verbal readback to: Tarsha Lebron on 08/09/2023 at 07:23. Reported by [id number].           PROTEIN TOTAL   4.9   2.7           PV peak gradient               PV PEAK VELOCITY               Est. RA pres               RBC   3.43   2.94           RDW   17.0   17.1           RV TB RVSP               Sample site Left Brachial Artery               Sample Type Arterial Blood               sO2, Blood gas 96.8               Sodium   138   145           TAPSE               TOC2, Blood gas 20.5               TDI SEPTAL               TDI LATERAL               THb, Blood gas 10.7               Triscuspid Valve Regurgitation Peak Gradient               TR Max Eugene               Troponin I     0.049   0.078   0.131       TV resting pulmonary artery pressure               WBC   1.98   1.56                                  Significant Imaging:  CT head w/o 8/8/2023:  FINDINGS:  There is no intracranial mass or lesion seen.   No hemorrhage is seen.  No acute infarct is seen.  There is some cerebral atrophy seen.  There is some compensatory ventricular dilatation and periventricular white matter changes consistent with the patient's age.  Calvarium is intact.  The posterior fossa is unremarkable..  The visualized portions of the paranasal sinuses show no acute abnormality.     Impression:     Chronic age-related changes.  No acute process     I have reviewed all pertinent imaging results/findings within the past 24 hours.

## 2023-08-09 NOTE — NURSING
Nurses Note -- 4 Eyes      8/8/2023   8:30 PM      Skin assessed during: Admit      [x] No Altered Skin Integrity Present    []Prevention Measures Documented      [] Yes- Altered Skin Integrity Present or Discovered   [] LDA Added if Not in Epic (Describe Wound)   [] New Altered Skin Integrity was Present on Admit and Documented in LDA   [] Wound Image Taken    Wound Care Consulted? No    Attending Nurse:  Julissa Velazquez RN/Staff Member:   Cheri Burns RN

## 2023-08-09 NOTE — CONSULTS
[facility] - 4th Floor Medical Telemetry  Neurology  Consult Note    Patient Name: Tessa Dozier  MRN: [id number]  Admission Date: 8/8/2023  Hospital Length of Stay: 1 days  Code Status: Full Code   Attending Provider: Laura Orr MD   Consulting Provider: Adore Reynolds [facility]  Primary Care Physician: Angélica Dominguez MD  Principal Problem:<principal problem not specified>    Inpatient consult to Neurology  Consult performed by: Adore Reynolds FNP  Consult ordered by: Laura Orr MD         Subjective:     Chief Complaint:       HPI:   82-year-old female PMH HTN, HLD, hypothyroidism, hepatocellular carcinoma s/p radiation at [facility], Liver disease with portal hypertension, esophageal and gastric varices, hepatitis-C where she was treated for anxiety, and R BBB who presented to ED on 08/08 due to altered mental status.  Patient reportedly confused and lethargic, symptoms began the day PTA.  Patient was also seen in ED the day PTA for fever and was diagnosed with COVID.  Patient was without supplemental oxygen requirements.    CT head without negative for acute intracranial abnormalities.  Labs significant for WBC 1.98, H/H 11.2/31.3, platelets 33, .8, COVID positive, and otherwise unremarkable.    Of note, pt recently had MRI brain w w/o last June that revealed nonenhancing scattered and confluent T2 hyperintense foci without restricted diffusion in the bilateral supratentorial white matter.  There is increased T1 shortening with some is decreased T2 signal in the bilateral globus palate I greater than putamen in both lenticular nuclei. Pt was referred to neurology (dr. Mulligan) o/p to review MRI brain.        Past Medical History:   Diagnosis Date    Hypercholesteremia     Hypertension     Thyroid disease        Past Surgical History:   Procedure Laterality Date    ear lobe surgery      EYE SURGERY  2018       Review of patient's allergies indicates:   Allergen Reactions  "   Promethazine      Other reaction(s): hallucinates    Azo-sulfisoxazole      Other reaction(s): rash    Ciprofloxacin      Other reaction(s): hives    Linaclotide      Other reaction(s): "feels awful"       Current Neurological Medications:     No current facility-administered medications on file prior to encounter.     Current Outpatient Medications on File Prior to Encounter   Medication Sig    ALPRAZolam (XANAX) 2 MG Tab Take 1 tablet (2 mg total) by mouth every 24 hours as needed (anxiety).    carvediloL (COREG) 3.125 MG tablet TAKE 1 TABLET BY MOUTH TWICE A DAY    levothyroxine (SYNTHROID) 50 MCG tablet TAKE 1 TABLET BY MOUTH EVERY TUESDAY AND THURSDAY    levothyroxine (SYNTHROID) 75 MCG tablet Take 1 tablet (75 mcg total) by mouth before breakfast.    pravastatin (PRAVACHOL) 40 MG tablet Take 40 mg by mouth once daily.    vibegron 75 mg Tab Take 1 tablet by mouth once daily.     Family History    None       Tobacco Use    Smoking status: Never     Passive exposure: Never    Smokeless tobacco: Never   Substance and Sexual Activity    Alcohol use: Never    Drug use: Never    Sexual activity: Not Currently     Review of Systems  Objective:     Vital Signs (Most Recent):  Temp: 97.4 °F (36.3 °C) (08/09/23 0827)  Pulse: 60 (08/09/23 0827)  Resp: 18 (08/09/23 0827)  BP: 104/63 (08/09/23 0827)  SpO2: 97 % (08/09/23 0827) Vital Signs (24h Range):  Temp:  [97.4 °F (36.3 °C)-100 °F (37.8 °C)] 97.4 °F (36.3 °C)  Pulse:  [58-89] 60  Resp:  [14-20] 18  SpO2:  [93 %-97 %] 97 %  BP: ()/(47-71) 104/63     Weight: 49.7 kg (109 lb 9.1 oz)  Body mass index is 19.41 kg/m².     Physical Exam      Defer to MD for full neurologic exam    Significant Labs:   Recent Lab Results  (Last 5 results in the past 24 hours)        08/09/23  0830   08/09/23  0818   08/09/23  0601   08/09/23  0002   08/08/23  1805        Albumin/Globulin Ratio   1.0   0.9           Albumin   2.4   1.3           Alkaline Phosphatase   45   " 25           Allens Test Yes               ALT   31   17           Ao peak akiko               Ao VTI               AST   74   41           AV valve area               JAMEL by Velocity Ratio               AV mean gradient               AV index (prosthetic)               AV peak gradient               AV Velocity Ratio               Baso #   0.00             Basophil %   0.0             BILIRUBIN TOTAL   1.5   0.9           BSA               BUN   24.3   14.8           Calcium   7.5   4.1  Comment: Critical Result called and verified by verbal readback to: Tarsha Bernardino on 08/09/2023 at 07:23. Reported by [id number].           Ionized Calcium 1.09               Chloride   113   128  Comment: Critical Result called and verified by verbal readback to: Tarsha Lebron on 08/09/2023 at 07:23. Reported by [id number].           CO2   21   13           Creatinine   0.77   0.41           Drawn by sd rrt               E/A ratio               E/E' ratio               eGFR   >60   >60           Eos #   0.00             Eosinophil %   0.0             E wave deceleration time               Globulin, Total   2.5   1.4           Glucose   141   85           Hematocrit   31.3   27.7           Hemoglobin   11.2   9.5           Immature Grans (Abs)   0.01             Immature Granulocytes   0.5             LA size               LVOT area               LV LATERAL E/E' RATIO               LV SEPTAL E/E' RATIO               Left Ventricular Outflow Tract Mean Gradient               Left Ventricular Outflow Tract Mean Velocity               LVOT diameter               LVOT peak akiko               LVOT stroke volume               LVOT peak VTI               Lymph #   0.39             LYMPH %   19.7             Magnesium   1.80             MCH   32.7   32.3           MCHC   35.8   34.3           MCV   91.3   94.2           Mean e'               Mono #   0.28             Mono %   14.1             MPV   10.5   11.1           MV valve area p 1/2  method               MV valve area by continuity eq               MV mean gradient               MV peak gradient               MV Peak A Eugene               MV Peak E Eugene               MV stenosis pressure 1/2 time               MV VTI               Neut #   1.30             Neut %   65.7             nRBC   0.0   0.0           O2 Hb, Blood Gas 96.0               Phosphorus   3.2             Platelets   33   27           Base Excess, Blood gas -3.00               CO Hgb 2.4               POC HCO3 19.7               Met Hgb 1.2               POC PCO2 27.0               POC PH 7.470               POC PO2 83.0               Potassium, Blood Gas 3.5               Sodium, Blood Gas 135               Potassium   3.5   1.8  Comment: Critical Result called and verified by verbal readback to: Tarsha Lebron on 08/09/2023 at 07:23. Reported by [id number].           PROTEIN TOTAL   4.9   2.7           PV peak gradient               PV PEAK VELOCITY               Est. RA pres               RBC   3.43   2.94           RDW   17.0   17.1           RV TB RVSP               Sample site Left Brachial Artery               Sample Type Arterial Blood               sO2, Blood gas 96.8               Sodium   138   145           TAPSE               TOC2, Blood gas 20.5               TDI SEPTAL               TDI LATERAL               THb, Blood gas 10.7               Triscuspid Valve Regurgitation Peak Gradient               TR Max Eugene               Troponin I     0.049   0.078   0.131       TV resting pulmonary artery pressure               WBC   1.98   1.56                                  Significant Imaging:  CT head w/o 8/8/2023:  FINDINGS:  There is no intracranial mass or lesion seen.  No hemorrhage is seen.  No acute infarct is seen.  There is some cerebral atrophy seen.  There is some compensatory ventricular dilatation and periventricular white matter changes consistent with the patient's age.  Calvarium is intact.  The posterior  fossa is unremarkable..  The visualized portions of the paranasal sinuses show no acute abnormality.     Impression:     Chronic age-related changes.  No acute process     I have reviewed all pertinent imaging results/findings within the past 24 hours.    Assessment and Plan:     Altered mental status  Neurologic exam improving  Will review cerebral images with neurologist            VTE Risk Mitigation (From admission, onward)         Ordered     Place sequential compression device  Until discontinued         08/08/23 3359                Thank you for your consult. Further recommendations to follow per MD Adore Reynolds, [facility]  Inpatient Neurology  [facility] - 4th Floor Medical Telemetry

## 2023-08-09 NOTE — PROGRESS NOTES
[facility] - 4th Floor Medical Telemetry    Cardiology  Progress Note    Patient Name: Tessa Dozier  MRN: [id number]  Admission Date: 8/8/2023  Hospital Length of Stay: 1 days  Code Status: Full Code   Attending Physician: Bebo Hanson MD   Primary Care Physician: Angélica Dominguez MD  Expected Discharge Date:   Principal Problem:<principal problem not specified>    Subjective:     Brief HPI:   Ms. Dozier is an 82 year old female who is known to [facility], Dr. Quesada. She presents to the ER on 8.8.23 for AMS. Per her , she was seen in the ER yesterday with chief complaint of weakness and fever and was diagnosed with COVID. He reports that she has had worsened lethargy and confusion since yesterday. Significant labs include WBC 2.28, RBC 3.48, H&H 11.3/32.3, Plt 41, .8, & trop 0.151. A CXR was obtained and demonstrated a small left pleural effusion. She was admitted to hospital medicine. [facility] has been consulted to further evaluate the patient's elevated troponin.      PMH: HTN, HLD, hypothyroidism, hepatocellular carcinoma, esophageal varices, anxiety, RBBB  PSH: ear lobe surgery, eye surgery, breast surgery, hysterectomy  Family History: None  Social History: No history of alcohol, tobacco, or illicit drug use.      Previous Cardiac Diagnostics:   TTE 12.29.22:  The study quality is average.   The left ventricle is normal in size. Global left ventricular systolic function is normal. The left ventricular ejection fraction is 70%. The left ventricle diastolic function is impaired (Grade I) with normal left atrial pressure.  Valvular structure are normal in appearance and function within study limits.   The estimated pulmonary artery systolic pressure is 23 mmHg assuming a right atrial pressure of 3 mmHg. The pulmonary artery appears to be normal.    TTE 8.7.23:  normal systolic function with a visually estimated ejection fraction of 55 - 60%.    Cardiac Calcium Score 12.29.22:  88      Hospital Course: NAD, troponin trending down. Mental status slightly improve; still pantocytopenic.    ROS  Unable to obtain secondary to mental status    Objective:     Vital Signs (Most Recent):  Temp: 97.4 °F (36.3 °C) (08/09/23 0827)  Pulse: 60 (08/09/23 0827)  Resp: 18 (08/09/23 0827)  BP: 104/63 (08/09/23 0827)  SpO2: 97 % (08/09/23 0827) Vital Signs (24h Range):  Temp:  [97.4 °F (36.3 °C)-100 °F (37.8 °C)] 97.4 °F (36.3 °C)  Pulse:  [58-89] 60  Resp:  [14-20] 18  SpO2:  [93 %-97 %] 97 %  BP: ()/(47-71) 104/63     Weight: 49.7 kg (109 lb 9.1 oz)  Body mass index is 19.41 kg/m².    SpO2: 97 %       Significant Labs:   Recent Results (from the past 72 hour(s))   Blood culture x two cultures. Draw prior to antibiotics.    Collection Time: 08/07/23  6:51 PM    Specimen: Blood   Result Value Ref Range    CULTURE, BLOOD ([facility]) No Growth At 24 Hours    Comprehensive metabolic panel    Collection Time: 08/07/23  6:51 PM   Result Value Ref Range    Sodium Level 137 136 - 145 mmol/L    Potassium Level 4.1 3.5 - 5.1 mmol/L    Chloride 112 (H) 98 - 107 mmol/L    Carbon Dioxide 20 (L) 23 - 31 mmol/L    Glucose Level 113 82 - 115 mg/dL    Blood Urea Nitrogen 19.0 9.8 - 20.1 mg/dL    Creatinine 0.78 0.55 - 1.02 mg/dL    Calcium Level Total 8.4 8.4 - 10.2 mg/dL    Protein Total 5.9 5.8 - 7.6 gm/dL    Albumin Level 3.0 (L) 3.4 - 4.8 g/dL    Globulin 2.9 2.4 - 3.5 gm/dL    Albumin/Globulin Ratio 1.0 (L) 1.1 - 2.0 ratio    Bilirubin Total 3.0 (H) <=1.5 mg/dL    Alkaline Phosphatase 61 40 - 150 unit/L    Alanine Aminotransferase 21 0 - 55 unit/L    Aspartate Aminotransferase 38 (H) 5 - 34 unit/L    eGFR >60 mls/min/1.73/m2   Lactic acid, plasma #1    Collection Time: 08/07/23  6:51 PM   Result Value Ref Range    Lactic Acid Level 1.9 0.5 - 2.2 mmol/L   Magnesium    Collection Time: 08/07/23  6:51 PM   Result Value Ref Range    Magnesium Level 1.70 1.60 - 2.60 mg/dL   Phosphorus    Collection Time: 08/07/23  6:51 PM    Result Value Ref Range    Phosphorus Level 2.3 2.3 - 4.7 mg/dL   APTT    Collection Time: 08/07/23  6:51 PM   Result Value Ref Range    PTT 36.9 (H) 23.2 - 33.7 seconds   Troponin I    Collection Time: 08/07/23  6:51 PM   Result Value Ref Range    Troponin-I <0.010 0.000 - 0.045 ng/mL   CBC with Differential    Collection Time: 08/07/23  6:51 PM   Result Value Ref Range    WBC 2.14 (L) 4.50 - 11.50 x10(3)/mcL    RBC 3.51 (L) 4.20 - 5.40 x10(6)/mcL    Hgb 11.6 (L) 12.0 - 16.0 g/dL    Hct 32.2 (L) 37.0 - 47.0 %    MCV 91.7 80.0 - 94.0 fL    MCH 33.0 (H) 27.0 - 31.0 pg    MCHC 36.0 33.0 - 36.0 g/dL    RDW 17.2 (H) 11.5 - 17.0 %    Platelet 47 (L) 130 - 400 x10(3)/mcL    MPV 11.0 (H) 7.4 - 10.4 fL    Neut % 69.6 %    Lymph % 10.3 %    Mono % 16.8 %    Eos % 1.9 %    Basophil % 0.9 %    Lymph # 0.22 (L) 0.6 - 4.6 x10(3)/mcL    Neut # 1.49 (L) 2.1 - 9.2 x10(3)/mcL    Mono # 0.36 0.1 - 1.3 x10(3)/mcL    Eos # 0.04 0 - 0.9 x10(3)/mcL    Baso # 0.02 <=0.2 x10(3)/mcL    IG# 0.01 0 - 0.04 x10(3)/mcL    IG% 0.5 %    NRBC% 0.0 %   COVID/FLU A&B PCR    Collection Time: 08/07/23  7:55 PM   Result Value Ref Range    Influenza A PCR Not Detected Not Detected    Influenza B PCR Not Detected Not Detected    SARS-CoV-2 PCR Detected (A) Not Detected, Negative, Invalid   Urinalysis, Reflex to Urine Culture    Collection Time: 08/07/23  8:13 PM    Specimen: Urine   Result Value Ref Range    Color, UA Dark Yellow Yellow, Light-Yellow, Dark Yellow, Lucia, Straw    Appearance, UA Clear Clear    Specific Gravity, UA 1.017 1.005 - 1.030    pH, UA 6.5 5.0 - 8.5    Protein, UA Negative Negative    Glucose, UA Negative Negative, Normal    Ketones, UA Negative Negative    Blood, UA 3+ (A) Negative    Bilirubin, UA Negative Negative    Urobilinogen, UA 1.0 0.2, 1.0, Normal    Nitrites, UA Negative Negative    Leukocyte Esterase, UA Negative Negative   Urinalysis, Microscopic    Collection Time: 08/07/23  8:13 PM   Result Value Ref Range    RBC,  UA 72 (H) <=5 /HPF    WBC, UA <5 <=5 /HPF    Squamous Epithelial Cells, UA <5 <=5 /HPF    Bacteria, UA None Seen None Seen, Rare, Occasional /HPF   Comprehensive metabolic panel    Collection Time: 08/08/23 11:56 AM   Result Value Ref Range    Sodium Level 136 136 - 145 mmol/L    Potassium Level 3.9 3.5 - 5.1 mmol/L    Chloride 111 (H) 98 - 107 mmol/L    Carbon Dioxide 20 (L) 23 - 31 mmol/L    Glucose Level 104 82 - 115 mg/dL    Blood Urea Nitrogen 19.6 9.8 - 20.1 mg/dL    Creatinine 0.91 0.55 - 1.02 mg/dL    Calcium Level Total 7.9 (L) 8.4 - 10.2 mg/dL    Protein Total 5.7 (L) 5.8 - 7.6 gm/dL    Albumin Level 2.8 (L) 3.4 - 4.8 g/dL    Globulin 2.9 2.4 - 3.5 gm/dL    Albumin/Globulin Ratio 1.0 (L) 1.1 - 2.0 ratio    Bilirubin Total 2.3 (H) <=1.5 mg/dL    Alkaline Phosphatase 54 40 - 150 unit/L    Alanine Aminotransferase 24 0 - 55 unit/L    Aspartate Aminotransferase 55 (H) 5 - 34 unit/L    eGFR >60 mls/min/1.73/m2   Brain natriuretic peptide    Collection Time: 08/08/23 11:56 AM   Result Value Ref Range    Natriuretic Peptide 955.8 (H) <=100.0 pg/mL   Troponin I    Collection Time: 08/08/23 11:56 AM   Result Value Ref Range    Troponin-I 0.151 (H) 0.000 - 0.045 ng/mL   CBC with Differential    Collection Time: 08/08/23 11:56 AM   Result Value Ref Range    WBC 2.28 (L) 4.50 - 11.50 x10(3)/mcL    RBC 3.48 (L) 4.20 - 5.40 x10(6)/mcL    Hgb 11.3 (L) 12.0 - 16.0 g/dL    Hct 32.3 (L) 37.0 - 47.0 %    MCV 92.8 80.0 - 94.0 fL    MCH 32.5 (H) 27.0 - 31.0 pg    MCHC 35.0 33.0 - 36.0 g/dL    RDW 17.2 (H) 11.5 - 17.0 %    Platelet 41 (L) 130 - 400 x10(3)/mcL    MPV 10.1 7.4 - 10.4 fL    Neut % 46.0 %    Lymph % 26.8 %    Mono % 25.9 %    Eos % 0.0 %    Basophil % 0.9 %    Lymph # 0.61 0.6 - 4.6 x10(3)/mcL    Neut # 1.05 (L) 2.1 - 9.2 x10(3)/mcL    Mono # 0.59 0.1 - 1.3 x10(3)/mcL    Eos # 0.00 0 - 0.9 x10(3)/mcL    Baso # 0.02 <=0.2 x10(3)/mcL    IG# 0.01 0 - 0.04 x10(3)/mcL    IG% 0.4 %    NRBC% 0.0 %   Ferritin    Collection  Time: 08/08/23  1:51 PM   Result Value Ref Range    Ferritin Level 68.63 4.63 - 204.00 ng/mL   Lactate Dehydrogenase    Collection Time: 08/08/23  1:51 PM   Result Value Ref Range    Lactate Dehydrogenase 235 (H) 125 - 220 U/L   Sedimentation rate    Collection Time: 08/08/23  1:51 PM   Result Value Ref Range    Sed Rate 3 0 - 20 mm/hr   D-Dimer, Quantitative    Collection Time: 08/08/23  1:52 PM   Result Value Ref Range    D-Dimer 2.01 (H) 0.00 - 0.50 ug/mL FEU   C-Reactive Protein    Collection Time: 08/08/23  2:01 PM   Result Value Ref Range    C-Reactive Protein 13.10 (H) <5.00 mg/L   Echo    Collection Time: 08/08/23  4:14 PM   Result Value Ref Range    BSA 1.42 m2    LVOT stroke volume 43.24 cm3    LVOT diameter 1.80 cm    LVOT area 2.5 cm2    MV Peak E Eugene 0.47 m/s    TDI LATERAL 0.10 m/s    TDI SEPTAL 0.09 m/s    E/E' ratio 4.95 m/s    MV Peak A Eugene 0.80 m/s    TR Max Eugene 2.2 m/s    E/A ratio 0.59     E wave deceleration time 195.00 msec    LV SEPTAL E/E' RATIO 5.22 m/s    LV LATERAL E/E' RATIO 4.70 m/s    LVOT peak eugene 0.88 m/s    Left Ventricular Outflow Tract Mean Velocity 0.57 cm/s    Left Ventricular Outflow Tract Mean Gradient 2.00 mmHg    LA size 3.20 cm    TAPSE 2.24 cm    AV mean gradient 3 mmHg    AV peak gradient 6 mmHg    Ao peak eugene 1.19 m/s    Ao VTI 23.20 cm    LVOT peak VTI 17.00 cm    AV valve area 1.86 cm²    AV Velocity Ratio 0.74     AV index (prosthetic) 0.73     JAMEL by Velocity Ratio 1.88 cm²    MV mean gradient 1 mmHg    MV peak gradient 3 mmHg    MV stenosis pressure 1/2 time 43.00 ms    MV valve area p 1/2 method 5.12 cm2    MV valve area by continuity eq 2.23 cm2    MV VTI 19.4 cm    Triscuspid Valve Regurgitation Peak Gradient 19 mmHg    PV PEAK VELOCITY 0.95 m/s    PV peak gradient 4 mmHg    Mean e' 0.10 m/s    TV resting pulmonary artery pressure 22 mmHg    RV TB RVSP 5 mmHg    Est. RA pres 3 mmHg   Troponin I    Collection Time: 08/08/23  6:05 PM   Result Value Ref Range     Troponin-I 0.131 (H) 0.000 - 0.045 ng/mL   Troponin I    Collection Time: 08/09/23 12:02 AM   Result Value Ref Range    Troponin-I 0.078 (H) 0.000 - 0.045 ng/mL   Comprehensive Metabolic Panel    Collection Time: 08/09/23  6:01 AM   Result Value Ref Range    Sodium Level 145 136 - 145 mmol/L    Potassium Level 1.8 (LL) 3.5 - 5.1 mmol/L    Chloride 128 (HH) 98 - 107 mmol/L    Carbon Dioxide 13 (L) 23 - 31 mmol/L    Glucose Level 85 82 - 115 mg/dL    Blood Urea Nitrogen 14.8 9.8 - 20.1 mg/dL    Creatinine 0.41 (L) 0.55 - 1.02 mg/dL    Calcium Level Total 4.1 (LL) 8.4 - 10.2 mg/dL    Protein Total 2.7 (L) 5.8 - 7.6 gm/dL    Albumin Level 1.3 (L) 3.4 - 4.8 g/dL    Globulin 1.4 (L) 2.4 - 3.5 gm/dL    Albumin/Globulin Ratio 0.9 (L) 1.1 - 2.0 ratio    Bilirubin Total 0.9 <=1.5 mg/dL    Alkaline Phosphatase 25 (L) 40 - 150 unit/L    Alanine Aminotransferase 17 0 - 55 unit/L    Aspartate Aminotransferase 41 (H) 5 - 34 unit/L    eGFR >60 mls/min/1.73/m2   Troponin I    Collection Time: 08/09/23  6:01 AM   Result Value Ref Range    Troponin-I 0.049 (H) 0.000 - 0.045 ng/mL   CBC with Differential    Collection Time: 08/09/23  6:01 AM   Result Value Ref Range    WBC 1.56 (LL) 4.50 - 11.50 x10(3)/mcL    RBC 2.94 (L) 4.20 - 5.40 x10(6)/mcL    Hgb 9.5 (L) 12.0 - 16.0 g/dL    Hct 27.7 (L) 37.0 - 47.0 %    MCV 94.2 (H) 80.0 - 94.0 fL    MCH 32.3 (H) 27.0 - 31.0 pg    MCHC 34.3 33.0 - 36.0 g/dL    RDW 17.1 (H) 11.5 - 17.0 %    Platelet 27 (LL) 130 - 400 x10(3)/mcL    MPV 11.1 (H) 7.4 - 10.4 fL    NRBC% 0.0 %   Comprehensive Metabolic Panel    Collection Time: 08/09/23  8:18 AM   Result Value Ref Range    Sodium Level 138 136 - 145 mmol/L    Potassium Level 3.5 3.5 - 5.1 mmol/L    Chloride 113 (H) 98 - 107 mmol/L    Carbon Dioxide 21 (L) 23 - 31 mmol/L    Glucose Level 141 (H) 82 - 115 mg/dL    Blood Urea Nitrogen 24.3 (H) 9.8 - 20.1 mg/dL    Creatinine 0.77 0.55 - 1.02 mg/dL    Calcium Level Total 7.5 (L) 8.4 - 10.2 mg/dL    Protein  Total 4.9 (L) 5.8 - 7.6 gm/dL    Albumin Level 2.4 (L) 3.4 - 4.8 g/dL    Globulin 2.5 2.4 - 3.5 gm/dL    Albumin/Globulin Ratio 1.0 (L) 1.1 - 2.0 ratio    Bilirubin Total 1.5 <=1.5 mg/dL    Alkaline Phosphatase 45 40 - 150 unit/L    Alanine Aminotransferase 31 0 - 55 unit/L    Aspartate Aminotransferase 74 (H) 5 - 34 unit/L    eGFR >60 mls/min/1.73/m2   Magnesium    Collection Time: 08/09/23  8:18 AM   Result Value Ref Range    Magnesium Level 1.80 1.60 - 2.60 mg/dL   Phosphorus    Collection Time: 08/09/23  8:18 AM   Result Value Ref Range    Phosphorus Level 3.2 2.3 - 4.7 mg/dL   CBC with Differential    Collection Time: 08/09/23  8:18 AM   Result Value Ref Range    WBC 1.98 (LL) 4.50 - 11.50 x10(3)/mcL    RBC 3.43 (L) 4.20 - 5.40 x10(6)/mcL    Hgb 11.2 (L) 12.0 - 16.0 g/dL    Hct 31.3 (L) 37.0 - 47.0 %    MCV 91.3 80.0 - 94.0 fL    MCH 32.7 (H) 27.0 - 31.0 pg    MCHC 35.8 33.0 - 36.0 g/dL    RDW 17.0 11.5 - 17.0 %    Platelet 33 (LL) 130 - 400 x10(3)/mcL    MPV 10.5 (H) 7.4 - 10.4 fL    Neut % 65.7 %    Lymph % 19.7 %    Mono % 14.1 %    Eos % 0.0 %    Basophil % 0.0 %    Lymph # 0.39 (L) 0.6 - 4.6 x10(3)/mcL    Neut # 1.30 (L) 2.1 - 9.2 x10(3)/mcL    Mono # 0.28 0.1 - 1.3 x10(3)/mcL    Eos # 0.00 0 - 0.9 x10(3)/mcL    Baso # 0.00 <=0.2 x10(3)/mcL    IG# 0.01 0 - 0.04 x10(3)/mcL    IG% 0.5 %    NRBC% 0.0 %   RT Blood Gas    Collection Time: 08/09/23  8:30 AM   Result Value Ref Range    Sample Type Arterial Blood     Sample site Left Brachial Artery     Drawn by sd rrt     pH, Blood gas 7.470 (H) 7.350 - 7.450    pCO2, Blood gas 27.0 (L) 35.0 - 45.0 mmHg    pO2, Blood gas 83.0 80.0 - 100.0 mmHg    Sodium, Blood Gas 135 (L) 137 - 145 mmol/L    Potassium, Blood Gas 3.5 3.5 - 5.0 mmol/L    Calcium Level Ionized 1.09 (L) 1.12 - 1.23 mmol/L    TOC2, Blood gas 20.5 mmol/L    Base Excess, Blood gas -3.00 (L) -2.00 - 2.00 mmol/L    sO2, Blood gas 96.8 %    HCO3, Blood gas 19.7 (L) 22.0 - 26.0 mmol/L    THb, Blood gas 10.7  (L) 12 - 16 g/dL    O2 Hb, Blood Gas 96.0 94.0 - 97.0 %    CO Hgb 2.4 (H) 0.5 - 1.5 %    Met Hgb 1.2 0.4 - 1.5 %    Allens Test Yes        Physical Exam  Constitutional:       General: She is not in acute distress.  Cardiovascular:      Rate and Rhythm: Normal rate and regular rhythm.      Pulses: Normal pulses.      Heart sounds: Normal heart sounds. No murmur heard.     No friction rub. No gallop.   Pulmonary:      Effort: Pulmonary effort is normal. No respiratory distress.      Breath sounds: Normal breath sounds. No wheezing or rales.   Abdominal:      General: Bowel sounds are normal.   Musculoskeletal:      Right lower leg: No edema.      Left lower leg: No edema.   Skin:     General: Skin is warm.   Neurological:      Comments: Aox2 ( not oriented to date)         Current Inpatient Medications:    Current Facility-Administered Medications:     0.9%  NaCl infusion, , Intravenous, Continuous, Bebo Hanson MD, Last Rate: 125 mL/hr at 08/09/23 0103, New Bag at 08/09/23 0103    albumin human 5% bottle 25 g, 25 g, Intravenous, Once, Laura Orr MD    ALPRAZolam tablet 1 mg, 1 mg, Oral, QHS, Bebo Hanson MD    levothyroxine tablet 75 mcg, 75 mcg, Oral, Before breakfast, Bebo Hanson MD, 75 mcg at 08/09/23 0624    mupirocin 2 % ointment, , Nasal, BID, Bebo Hanson MD, Given at 08/09/23 0103    piperacillin-tazobactam (ZOSYN) 4.5 g in dextrose 5 % in water (D5W) 100 mL IVPB (MB+), 4.5 g, Intravenous, Q12H, Laura Orr MD    potassium chloride 20 mEq in 100 mL IVPB (FOR CENTRAL LINE ADMINISTRATION ONLY), 40 mEq, Intravenous, Once, Bebo Hanson MD    pravastatin tablet 40 mg, 40 mg, Oral, Daily, Bebo Hanson MD    [COMPLETED] remdesivir 200 mg in sodium chloride 0.9% 250 mL infusion, 200 mg, Intravenous, Q24H, Stopped at 08/08/23 1410 **FOLLOWED BY** remdesivir 100 mg in sodium chloride 0.9% 100 mL infusion, 100 mg, Intravenous, Daily, Bebo Hanson MD    sodium bicarbonate 150 mEq in  dextrose 5 % (D5W) 1,000 mL infusion, , Intravenous, Continuous, Laura Orr MD    sodium bicarbonate solution 50 mEq, 50 mEq, Intravenous, Once, Laura Orr MD    sodium chloride 0.9% bolus 500 mL 500 mL, 500 mL, Intravenous, Once, Laura Orr MD    VTE Risk Mitigation (From admission, onward)           Ordered     Place sequential compression device  Until discontinued         08/08/23 1553                    Assessment:   Acute COVID 19    - (+) 8.7.23  NSTEMI - likely type 2 in the setting of COVID 19 (COVID myocarditis )  Acute Metabolic Encephalopathy  Pancytopenia   Elevated BNP  Elevated D-dimer  Diarrhea   RBBB  HTN  HLD  Hypothyroidism  Hx Hepatocellular Carcinoma   Hx Esophageal Varices      Plan:     -troponin trended down, low suspicion for ACS  -Replete electrolytes  -Unable to start antiplatelet 2/2 thrombocytopenia.  -Will need repeat echo in 6 weeks    David Moon MD  Cardiology  [facility] - 4th Floor Medical Telemetry  08/09/2023      Attending physician note  I have rounded with medical resident. I personally reviewed case related differential diagnoses, assessment and plan. A thorough physical examination noted above is performed by me. I have personally reviewed the labs, test results and medication lists that are currently available.  See above MDM formulated by me (Lj Bowers MD):     All of the patient's and/or family's questions have been addressed and answered to the best of my ability.

## 2023-08-09 NOTE — PROGRESS NOTES
"Inpatient Nutrition Evaluation    Admit Date: 8/8/2023   Total duration of encounter: 1 day    Nutrition Recommendation/Prescription     Continue current diet as tolerated    Bantrol TD for diarrhea     Monitor glucose     Monitor po intake, weight     Nutrition Assessment     Chart Review    Reason Seen: continuous nutrition monitoring HF?     Malnutrition Screening Tool Results   Have you recently lost weight without trying?: No  Have you been eating poorly because of a decreased appetite?: No   MST Score: 0     Diagnosis:  Acute COVID-19 infection, + 08/07/2023   Sepsis secondary to above  NSTEMI, likely type 2   Acute metabolic encephalopathy  Leukopenia   Thrombocytopenia   Normocytic anemia  Metabolic acidosis  Essential hypertension  Hyperlipidemia  Hypothyroidism       Relevant Medical History:   Past Medical History:   Diagnosis Date    Hypercholesteremia     Hypertension     Thyroid disease      Past Surgical History:   Procedure Laterality Date    ear lobe surgery      EYE SURGERY  2018     Review of patient's allergies indicates:   Allergen Reactions    Promethazine      Other reaction(s): hallucinates    Azo-sulfisoxazole      Other reaction(s): rash    Ciprofloxacin      Other reaction(s): hives    Linaclotide      Other reaction(s): "feels awful"        Nutrition-Related Medications:    ALPRAZolam  1 mg Oral QHS    doxycycline (VIBRAMYCIN) IVPB  100 mg Intravenous Q12H    hydrocortisone   Rectal BID    levothyroxine  75 mcg Oral Before breakfast    mupirocin   Nasal BID    piperacillin-tazobactam (Zosyn) IV (PEDS and ADULTS) (extended infusion is not appropriate)  4.5 g Intravenous Q8H    pravastatin  40 mg Oral Daily    remdesivir infusion  100 mg Intravenous Daily       sodium chloride 0.9% 125 mL/hr at 08/09/23 0103    sodium bicarbonate 150 mEq in dextrose 5 % (D5W) 1,000 mL infusion 75 mL/hr at 08/09/23 0920         Calorie Containing IV Medications: no significant kcals from medications at this " "time    Nutrition-Related Labs:  Hemoglobin A1c   Date Value Ref Range Status   06/07/2023 5.3 <=7.0 % Final   12/15/2020 5.6 <<=7.0 % Final   06/10/2020 5.6 <<=7.0 % Final     8/9/2023: Magnesium Level 1.80 mg/dL (Ref range: 1.60 - 2.60 mg/dL); Phosphorus Level 3.2 mg/dL (Ref range: 2.3 - 4.7 mg/dL); Potassium Level 3.5 mmol/L (Ref range: 3.5 - 5.1 mmol/L); Sodium Level 138 mmol/L (Ref range: 136 - 145 mmol/L)   Recent Labs   Lab 08/08/23  1156 08/09/23  0601 08/09/23  0818   WBC 2.28* 2  1.56* 1.98*   RBC 3.48* 2.94* 3.43*   HGB 11.3* 9.5* 11.2*   HCT 32.3* 27.7* 31.3*   MCV 92.8 94.2* 91.3   MCH 32.5* 32.3* 32.7*   MCHC 35.0 34.3 35.8     Recent Labs   Lab 08/07/23  1851 08/08/23  1156 08/09/23  0601 08/09/23  0818 08/09/23  0830    136 145 138  --    K 4.1 3.9 1.8* 3.5  --    CO2 20* 20* 13* 21*  --    BUN 19.0 19.6 14.8 24.3*  --    CREATININE 0.78 0.91 0.41* 0.77  --    CALCIUM 8.4 7.9* 4.1* 7.5*  --    PH  --   --   --   --  7.470*   MG 1.70  --   --  1.80  --    ALBUMIN 3.0* 2.8* 1.3* 2.4*  --    ALKPHOS 61 54 25* 45  --    ALT 21 24 17 31  --    AST 38* 55* 41* 74*  --    BILITOT 3.0* 2.3* 0.9 1.5  --    8/9: Glu 141, Cl 113    Diet Order: Diet Adult Regular  Oral Supplement Order: none  Appetite/Oral Intake: good/% of meals  Factors Affecting Nutritional Intake: none identified  Food/Jainism/Cultural Preferences: none reported  Food Allergies: none reported       Wound(s):   n/a    Comments    8/9/23: Pt w/ good appetite, reports no weight change (which EMR weight hx support), chews/swallows well, only GI complaint is diarrhea. Will add banatrol.      Anthropometrics    Height: 5' 3" (160 cm)    Last Weight: 49.7 kg (109 lb 9.1 oz) (08/09/23 0024) Weight Method: Bed Scale  BMI (Calculated): 19.4  BMI Classification: underweight (BMI less than 22 if >65 years of age)     Ideal Body Weight (IBW), Female: 115 lb     % Ideal Body Weight, Female (lb): 86.96 %                             Usual " Weight Provided By: patient and EMR weight history 105-110 lbs    Wt Readings from Last 5 Encounters:   08/09/23 49.7 kg (109 lb 9.1 oz)   08/07/23 49.2 kg (108 lb 7.5 oz)   07/12/23 50.3 kg (110 lb 14.4 oz)   06/26/23 49.2 kg (108 lb 8 oz)   06/07/23 50.2 kg (110 lb 11.2 oz)     Weight Change(s) Since Admission:  Admit Weight: 45.4 kg (100 lb) (08/08/23 1138)  8/9/23: 110 lbs     Patient Education    Not applicable.    Monitoring & Evaluation     Dietitian will monitor food and beverage intake, weight, electrolyte/renal panel, and gastrointestinal profile.  Nutrition Risk/Follow-Up: low (follow-up in 5-7 days)  Patients assigned 'low nutrition risk' status do not qualify for a full nutritional assessment but will be monitored and re-evaluated in a 5-7 day time period. Please consult if re-evaluation needed sooner.

## 2023-08-10 LAB
ANION GAP SERPL CALC-SCNC: 4 MEQ/L
BASOPHILS # BLD AUTO: 0 X10(3)/MCL
BASOPHILS NFR BLD AUTO: 0 %
BUN SERPL-MCNC: 21.9 MG/DL (ref 9.8–20.1)
CALCIUM SERPL-MCNC: 7.7 MG/DL (ref 8.4–10.2)
CHLORIDE SERPL-SCNC: 109 MMOL/L (ref 98–107)
CO2 SERPL-SCNC: 27 MMOL/L (ref 23–31)
CREAT SERPL-MCNC: 0.7 MG/DL (ref 0.55–1.02)
CREAT/UREA NIT SERPL: 31
CRP SERPL-MCNC: 15.8 MG/L
EOSINOPHIL # BLD AUTO: 0 X10(3)/MCL (ref 0–0.9)
EOSINOPHIL NFR BLD AUTO: 0 %
ERYTHROCYTE [DISTWIDTH] IN BLOOD BY AUTOMATED COUNT: 17 % (ref 11.5–17)
FERRITIN SERPL-MCNC: 83.41 NG/ML (ref 4.63–204)
FOLATE SERPL-MCNC: 11.9 NG/ML (ref 7–31.4)
GFR SERPLBLD CREATININE-BSD FMLA CKD-EPI: >60 MLS/MIN/1.73/M2
GLUCOSE SERPL-MCNC: 126 MG/DL (ref 82–115)
HCT VFR BLD AUTO: 28.7 % (ref 37–47)
HEMATOLOGIST REVIEW: NORMAL
HGB BLD-MCNC: 10.3 G/DL (ref 12–16)
IMM GRANULOCYTES # BLD AUTO: 0.01 X10(3)/MCL (ref 0–0.04)
IMM GRANULOCYTES NFR BLD AUTO: 0.4 %
LDH SERPL-CCNC: 212 U/L (ref 125–220)
LYMPHOCYTES # BLD AUTO: 0.43 X10(3)/MCL (ref 0.6–4.6)
LYMPHOCYTES NFR BLD AUTO: 16.8 %
MAGNESIUM SERPL-MCNC: 1.9 MG/DL (ref 1.6–2.6)
MCH RBC QN AUTO: 32.6 PG (ref 27–31)
MCHC RBC AUTO-ENTMCNC: 35.9 G/DL (ref 33–36)
MCV RBC AUTO: 90.8 FL (ref 80–94)
MONOCYTES # BLD AUTO: 0.26 X10(3)/MCL (ref 0.1–1.3)
MONOCYTES NFR BLD AUTO: 10.2 %
NEUTROPHILS # BLD AUTO: 1.86 X10(3)/MCL (ref 2.1–9.2)
NEUTROPHILS NFR BLD AUTO: 72.6 %
NRBC BLD AUTO-RTO: 0 %
PLATELET # BLD AUTO: 37 X10(3)/MCL (ref 130–400)
PMV BLD AUTO: 11 FL (ref 7.4–10.4)
POTASSIUM SERPL-SCNC: 3.4 MMOL/L (ref 3.5–5.1)
RBC # BLD AUTO: 3.16 X10(6)/MCL (ref 4.2–5.4)
SODIUM SERPL-SCNC: 140 MMOL/L (ref 136–145)
VANCOMYCIN SERPL-MCNC: <1.4 UG/ML (ref 15–20)
WBC # SPEC AUTO: 2.56 X10(3)/MCL (ref 4.5–11.5)

## 2023-08-10 PROCEDURE — 83735 ASSAY OF MAGNESIUM: CPT | Performed by: INTERNAL MEDICINE

## 2023-08-10 PROCEDURE — 25000003 PHARM REV CODE 250: Performed by: INTERNAL MEDICINE

## 2023-08-10 PROCEDURE — 82728 ASSAY OF FERRITIN: CPT | Performed by: INTERNAL MEDICINE

## 2023-08-10 PROCEDURE — 85025 COMPLETE CBC W/AUTO DIFF WBC: CPT | Performed by: INTERNAL MEDICINE

## 2023-08-10 PROCEDURE — 27000207 HC ISOLATION

## 2023-08-10 PROCEDURE — 63600175 PHARM REV CODE 636 W HCPCS: Performed by: INTERNAL MEDICINE

## 2023-08-10 PROCEDURE — 80048 BASIC METABOLIC PNL TOTAL CA: CPT | Performed by: INTERNAL MEDICINE

## 2023-08-10 PROCEDURE — 83615 LACTATE (LD) (LDH) ENZYME: CPT | Performed by: INTERNAL MEDICINE

## 2023-08-10 PROCEDURE — 97162 PT EVAL MOD COMPLEX 30 MIN: CPT

## 2023-08-10 PROCEDURE — 86140 C-REACTIVE PROTEIN: CPT | Performed by: INTERNAL MEDICINE

## 2023-08-10 PROCEDURE — 21400001 HC TELEMETRY ROOM

## 2023-08-10 PROCEDURE — 80202 ASSAY OF VANCOMYCIN: CPT | Performed by: INTERNAL MEDICINE

## 2023-08-10 PROCEDURE — 82746 ASSAY OF FOLIC ACID SERUM: CPT | Performed by: INTERNAL MEDICINE

## 2023-08-10 RX ORDER — POTASSIUM CHLORIDE 20 MEQ/1
40 TABLET, EXTENDED RELEASE ORAL ONCE
Status: COMPLETED | OUTPATIENT
Start: 2023-08-10 | End: 2023-08-10

## 2023-08-10 RX ADMIN — REMDESIVIR 100 MG: 100 INJECTION, POWDER, LYOPHILIZED, FOR SOLUTION INTRAVENOUS at 10:08

## 2023-08-10 RX ADMIN — LEVOTHYROXINE SODIUM 75 MCG: 25 TABLET ORAL at 06:08

## 2023-08-10 RX ADMIN — HYDROCORTISONE 2.5%: 25 CREAM TOPICAL at 08:08

## 2023-08-10 RX ADMIN — MUPIROCIN: 20 OINTMENT TOPICAL at 10:08

## 2023-08-10 RX ADMIN — DOXYCYCLINE 100 MG: 100 INJECTION, POWDER, LYOPHILIZED, FOR SOLUTION INTRAVENOUS at 02:08

## 2023-08-10 RX ADMIN — POTASSIUM CHLORIDE 40 MEQ: 1500 TABLET, EXTENDED RELEASE ORAL at 02:08

## 2023-08-10 RX ADMIN — MUPIROCIN: 20 OINTMENT TOPICAL at 08:08

## 2023-08-10 RX ADMIN — PRAVASTATIN SODIUM 40 MG: 40 TABLET ORAL at 10:08

## 2023-08-10 RX ADMIN — PIPERACILLIN AND TAZOBACTAM 4.5 G: 4; .5 INJECTION, POWDER, LYOPHILIZED, FOR SOLUTION INTRAVENOUS; PARENTERAL at 06:08

## 2023-08-10 RX ADMIN — DOXYCYCLINE 100 MG: 100 INJECTION, POWDER, LYOPHILIZED, FOR SOLUTION INTRAVENOUS at 10:08

## 2023-08-10 RX ADMIN — ALPRAZOLAM 1 MG: 0.5 TABLET ORAL at 08:08

## 2023-08-10 RX ADMIN — SODIUM BICARBONATE: 84 INJECTION, SOLUTION INTRAVENOUS at 04:08

## 2023-08-10 RX ADMIN — HYDROCORTISONE 2.5%: 25 CREAM TOPICAL at 10:08

## 2023-08-10 NOTE — PROGRESS NOTES
[facility]  Hospital Medicine Progress Note        Chief Complaint: Inpatient Follow-up for COVID infection     HPI:   Tessa Dozier is a 82 y.o. female with a past medical history of essential hypertension, hyperlipidemia, and hypothyroidism presented to [facility] on 8/8/2023 for altered mental status.   reported to ED that patient has had increased confusion and lethargy since yesterday. Patient was seen in ED yesterday with primary complaint of weakness and fever and was diagnosed with COVID.  History is limited secondary to patient's medical condition and majority of the history was obtained from review of the medical record.  Initial vital signs in ED were /63, pulse 89, respirations 14, temperature 37.8° C, and SpO2 94% on room air.  Labs revealed WBC 2.28, RBC 3.48, hemoglobin 11.3, hematocrit 32.3, platelets 41, chloride 111, CO2 20, .8, and troponin 0.151.  Chest x-ray revealed small left pleural effusion. Patient was given IV Remdesivir 200 mg, IV dexamethasone 8 mg, and IV lasix 40 mg in ED. Patient was admitted to hospital medicine service for further medical management.     Patient was weak and PT was started.     Interval Hx:   Patient today awake and comfortable. Denies any SOB, cough, chest pain, fever or chills. Her appetite is good. She is weak and has not ambulated since admit. Will start PT today.       Objective/physical exam:  General: In no acute distress, Frail  Chest: Clear to auscultation bilaterally  Heart: RRR, +S1, S2, no appreciable murmur  Abdomen: Soft, nontender, BS +  MSK: Warm, no lower extremity edema, no clubbing or cyanosis  Neurologic: Cranial nerve II-XII intact, Strength 5/5 Jason UE, Jason LE 3/5    VITAL SIGNS: 24 HRS MIN & MAX LAST   Temp  Min: 97 °F (36.1 °C)  Max: 98.3 °F (36.8 °C) 97.3 °F (36.3 °C)   BP  Min: 98/59  Max: 111/58 108/64   Pulse  Min: 52  Max: 72  66   Resp  Min: 14  Max: 20 14   SpO2  Min: 93 %  Max: 96 % (!)  94 %     I have reviewed the following labs:    Recent Labs   Lab 08/09/23  0601 08/09/23  0818 08/10/23  0400   WBC 2  1.56* 1.98* 2.56*   RBC 2.94* 3.43* 3.16*   HGB 9.5* 11.2* 10.3*   HCT 27.7* 31.3* 28.7*   MCV 94.2* 91.3 90.8   MCH 32.3* 32.7* 32.6*   MCHC 34.3 35.8 35.9   RDW 17.1* 17.0 17.0   PLT 27* 33* 37*   MPV 11.1* 10.5* 11.0*       Recent Labs   Lab 08/07/23  1851 08/08/23  1156 08/09/23  0601 08/09/23  0818 08/09/23  0830 08/10/23  0400    136 145 138  --  140   K 4.1 3.9 1.8* 3.5  --  3.4*   CO2 20* 20* 13* 21*  --  27   BUN 19.0 19.6 14.8 24.3*  --  21.9*   CREATININE 0.78 0.91 0.41* 0.77  --  0.70   CALCIUM 8.4 7.9* 4.1* 7.5*  --  7.7*   PH  --   --   --   --  7.470*  --    MG 1.70  --   --  1.80  --  1.90   ALBUMIN 3.0* 2.8* 1.3* 2.4*  --   --    ALKPHOS 61 54 25* 45  --   --    ALT 21 24 17 31  --   --    AST 38* 55* 41* 74*  --   --    BILITOT 3.0* 2.3* 0.9 1.5  --   --           Microbiology Results (last 7 days)       Procedure Component Value Units Date/Time    Respiratory Culture [[id number]]     Order Status: Sent Specimen: Sputum, Expectorated     Blood Culture #1 **CANNOT BE ORDERED STAT** [[id number]]  (Normal) Collected: 08/08/23 3378    Order Status: Completed Specimen: Blood from Arm, Left Updated: 08/09/23 1600     CULTURE, BLOOD ([facility]) No Growth At 24 Hours    Blood Culture #2 **CANNOT BE ORDERED STAT** [[id number]]  (Normal) Collected: 08/08/23 1438    Order Status: Completed Specimen: Blood from Arm, Right Updated: 08/09/23 1600     CULTURE, BLOOD ([facility]) No Growth At 24 Hours    Respiratory Culture [[id number]] Collected: 08/09/23 1235    Order Status: Sent Specimen: Sputum, Expectorated     Stool Culture [[id number]]     Order Status: Sent Specimen: Stool              See below for Radiology    Scheduled Med:   ALPRAZolam  1 mg Oral QHS    doxycycline (VIBRAMYCIN) IVPB  100 mg Intravenous Q12H    hydrocortisone   Rectal BID    levothyroxine  75 mcg Oral Before breakfast     mupirocin   Nasal BID    potassium chloride  40 mEq Oral Once    pravastatin  40 mg Oral Daily        Continuous Infusions:   sodium chloride 0.9% 125 mL/hr at 08/09/23 0103    sodium bicarbonate 150 mEq in dextrose 5 % (D5W) 1,000 mL infusion 75 mL/hr at 08/10/23 0403        PRN Meds:         Assessment/Plan:  Acute COVID-19 infection, + 08/07/2023   Sepsis secondary to above  NSTEMI, likely type 2   Acute metabolic encephalopathy: improved   Leukopenia   Thrombocytopenia   Normocytic anemia  Metabolic acidosis: improved   Essential hypertension  Hyperlipidemia  Hypothyroidism  Generalized weakness     Plan:  Patient has been afebrile. Has no respiratory issues   O2 sats stable   Cont iv doxy for now. Holding zosyn - has low platelets   Received 3 dose of remdesivir   She is weak and PT will be started   Her appetite is good and encouraged to drink plenty of fluids  IV fluids has been stopped  Will replace potassium   Continue strict aspiration, fall and decubitus precautions    Current meds reviewed      Labs in am     VTE prophylaxis: scd    Patient condition:  Fair    Anticipated discharge and Disposition:   Home with        All diagnosis and differential diagnosis have been reviewed; assessment and plan has been documented; I have personally reviewed the labs and test results that are presently available; I have reviewed the patients medication list; I have reviewed the consulting providers response and recommendations. I have reviewed or attempted to review medical records based upon their availability    All of the patient's questions have been  addressed and answered. Patient's is agreeable to the above stated plan. I will continue to monitor closely and make adjustments to medical management as needed.  _____________________________________________________________________    Nutrition Status:    Radiology:  I have personally reviewed the following imaging and agree with the radiologist.     CT Abdomen  Pelvis With Contrast  Narrative: EXAMINATION:  CT ABDOMEN PELVIS WITH CONTRAST    CLINICAL HISTORY:  Sepsis;    TECHNIQUE:  Helically acquired images with axial, sagittal and coronal reformations were obtained from the lung bases to the pubic symphysis after the IV administration of contrast.    Automated tube current modulation, weight-based exposure dosing, and/or iterative reconstruction technique utilized to reach lowest reasonably achievable exposure rate.    DLP: 230 mGy*cm    COMPARISON:  CT abdomen pelvis 07/30/2019    FINDINGS:  HEART: Normal in size. No pericardial effusion.    LUNG BASES: Trace pleural effusions with dependent atelectasis.    LIVER: Hypoenhancement at the junction of the right and left lobes of the liver in segment 4 appears very similar to remote prior exam possibly related to confluent hepatic fibrosis or post treatment change.  Exam not protocol to evaluate for a pedis cellular carcinoma.  Unchanged hypodensity at the caudate lobe.    BILIARY: The gallbladder is surgically absent.    PANCREAS: There are coils in the region of the head of the pancreas.    SPLEEN: Spleen measures 14 cm.    ADRENALS: No mass.    KIDNEYS/URETERS: The kidneys enhance symmetrically.  No hydronephrosis.    GI TRACT/MESENTERY: There are gastroesophageal varices.  Stomach is mildly distended.  Bowel appears edematous, particularly the right hemicolon.  Colonic diverticulosis without acute inflammatory change.    PERITONEUM: There is a small volume abdominal ascites.No free air.    LYMPH NODES: No enlarged lymph nodes by size criteria.    VASCULATURE: Aortoiliac atherosclerosis.  Mesenteric arteries are patent.  The superior mesenteric vein and splenic vein demonstrate early enhancement.  There is no appreciable enhancement of the main portal vein.  There are gastroesophageal varices.  There is portosystemic shunt from the IMV toward lumbar veins.    BLADDER: Normal appearance given degree of  distention.    REPRODUCTIVE ORGANS: Excreted contrast related to preceding CTA chest within the urinary bladder.    SOFT TISSUES: Unremarkable.    BONES: There are pars defects at L5 with grade 1 anterolisthesis.  Impression: 1. Findings suspicious for portal vein thrombus with portosystemic collaterals.  2. Abnormal enhancement at the junction of the right and left lobes of the liver and segment 4 suspicious for confluent fibrosis versus post treatment change.  There were some similar changes on a prior CT exam.  Note exam not protocol to evaluate for hepatocellular carcinoma.  Ideally recommend comparison with any available more recent prior exams.  3. Splenomegaly and small volume ascites  4. Edematous appearance of the bowel may be related to portal enteropathy/colopathy or other nonspecific enterocolitis.  5. Trace pleural effusions and atelectasis.    Electronically signed by: Leticia Parker  Date:    08/09/2023  Time:    12:54      Luis Fitzgerald MD   08/10/2023

## 2023-08-10 NOTE — PT/OT/SLP EVAL
Physical Therapy Evaluation    Patient Name:  Tessa Dozier   MRN:  [id number]    Recommendations:     Discharge Recommendations: nursing facility, skilled   Discharge Equipment Recommendations:  (TBD by next level of care)   Barriers to discharge: Impaired mobility and Ongoing medical needs    Assessment:     Tessa Dozier is a 82 y.o. female admitted with AMS. She is COVID +. Patient reports living with spouse in 1st-floor [facility]. At baseline, she is independent and ambulates with RW. She presents with the following impairments/functional limitations: weakness, impaired endurance, impaired self care skills, impaired functional mobility, gait instability, impaired balance, impaired cognition, decreased safety awareness. She required MIN A for bed mobility. MIN A for sit<>stand. Ambulated 16 ft with RW & CGA-MIN A. Balance and safety awareness were poor. Patient will most likely need SNF placement at discharge. Progress patient as tolerated.     Rehab Prognosis: Good; patient would benefit from acute skilled PT services to address these deficits and reach maximum level of function.    Recent Surgery: * No surgery found *      Plan:     During this hospitalization, patient to be seen 5 x/week to address the identified rehab impairments via gait training, therapeutic activities, therapeutic exercises, neuromuscular re-education and progress toward the following goals:    Plan of Care Expires:  09/10/23    Subjective     Chief Complaint: none  Patient/Family Comments/goals: none  Pain/Comfort:  Pain Rating 1: 0/10    Patients cultural, spiritual, Episcopal conflicts given the current situation: no    Living Environment:  Lives with spouse in 1st-floor [facility].  Prior to admission, patients level of function was independent.  Equipment used at home: walker, rolling.  DME owned (not currently used):  rollator .  Upon discharge, patient will have assistance from TBD.    Objective:     Communicated with nurse prior to  session.  Patient found supine with telemetry  upon PT entry to room.    General Precautions: Standard, fall, contact, droplet  Orthopedic Precautions:N/A   Braces: N/A  Respiratory Status: Room air      Exams:  Cognitive Exam:  Patient is oriented to Person  Sensation: -       Intact  RLE ROM: WFL  RLE Strength: -4/5 grossly  LLE ROM: WFL  LLE Strength: -4/5 grossly  Skin integrity: Visible skin intact      Functional Mobility:  Bed Mobility:     Supine to Sit: minimum assistance  Sit to Supine: minimum assistance  Transfers:  Sit to Stand:  minimum assistance with rolling walker  Gait: 16 ft with RW & CGA-MIN A.   Balance: poor      AM-PAC 6 CLICK MOBILITY  Total Score:17     Education Provided:  Role and goals of PT, transfer training, bed mobility, gait training, balance training, safety awareness, assistive device, strengthening exercises, deep breathing techniques, and importance of participating in PT to return to PLOF.    Expected compliance:  Moderate      Patient left supine with all lines intact, call button in reach, and bed alarm on.    GOALS:   Multidisciplinary Problems       Physical Therapy Goals          Problem: Physical Therapy    Goal Priority Disciplines Outcome Goal Variances Interventions   Physical Therapy Goal     PT, PT/OT Ongoing, Progressing     Description: Goals to be met by: 9/10/23     Patient will increase functional independence with mobility by performin. Supine to sit with [place]  2. Sit to supine with [place]  3. Sit to stand transfer with Modified [place]  4. Gait  x 200 feet with Modified [place] using Rolling Walker.                          History:     Past Medical History:   Diagnosis Date    Hypercholesteremia     Hypertension     Thyroid disease        Past Surgical History:   Procedure Laterality Date    ear lobe surgery      EYE SURGERY         Time Tracking:     PT Received On: 08/10/23  PT Start Time: 1530     PT Stop Time: 1550  PT  Total Time (min): 20 min     Billable Minutes: Evaluation 20 minutes      08/10/2023

## 2023-08-10 NOTE — PLAN OF CARE
Problem: Physical Therapy  Goal: Physical Therapy Goal  Description: Goals to be met by: 9/10/23     Patient will increase functional independence with mobility by performin. Supine to sit with [place]  2. Sit to supine with [place]  3. Sit to stand transfer with Modified [place]  4. Gait  x 200 feet with Modified [place] using Rolling Walker.     Outcome: Ongoing, Progressing

## 2023-08-10 NOTE — PLAN OF CARE
08/10/23 1226   Discharge Assessment   Assessment Type Discharge Planning Assessment   Source of Information family   If unable to respond/provide information was family/caregiver contacted? Yes   Contact Name/Number Rodriguez Dozier (spouse) [phone]   Communicated BRITTNEY with patient/caregiver Date not available/Unable to determine   Reason For Admission COVID   People in Home spouse   Do you expect to return to your current living situation? Other (see comments)  (May need placement)   Do you have help at home or someone to help you manage your care at home? Yes   Who are your caregiver(s) and their phone number(s)? Rodriguez Dozier () [phone]   Current cognitive status: Unable to Assess   Walking or Climbing Stairs ambulation difficulty, requires equipment   Mobility Management Walker   Dressing/Bathing bathing difficulty, assistance 1 person;dressing difficulty, assistance 1 person   Dressing/Bathing Management Spouse assist with bathing and dressing   Equipment Currently Used at Home walker, rolling   Do you currently have service(s) that help you manage your care at home? No   Do you take prescription medications? Yes   Do you have prescription coverage? Yes   Do you have any problems affording any of your prescribed medications? No   Is the patient taking medications as prescribed? yes   How do you get to doctors appointments? family or friend will provide   Are you on dialysis? No   Do you take coumadin? No   Discharge Plan A Skilled Nursing Facility   Discharge Plan B Home Health   Discharge Plan discussed with: Spouse/sig other   OTHER   Name(s) of People in Home Rodriguez (spouse)

## 2023-08-11 LAB
ALBUMIN SERPL-MCNC: 2.6 G/DL (ref 3.4–4.8)
ALBUMIN/GLOB SERPL: 1.1 RATIO (ref 1.1–2)
ALP SERPL-CCNC: 45 UNIT/L (ref 40–150)
ALT SERPL-CCNC: 27 UNIT/L (ref 0–55)
ANTINUCLEAR ANTIBODY SCREEN (OHS): NEGATIVE
AST SERPL-CCNC: 62 UNIT/L (ref 5–34)
BASOPHILS # BLD AUTO: 0.01 X10(3)/MCL
BASOPHILS NFR BLD AUTO: 0.4 %
BILIRUB SERPL-MCNC: 1.6 MG/DL
BUN SERPL-MCNC: 15.1 MG/DL (ref 9.8–20.1)
CALCIUM SERPL-MCNC: 7.7 MG/DL (ref 8.4–10.2)
CENTROMERE QUANT (OHS): 0.9 U/ML
CHLORIDE SERPL-SCNC: 113 MMOL/L (ref 98–107)
CO2 SERPL-SCNC: 24 MMOL/L (ref 23–31)
CREAT SERPL-MCNC: 0.64 MG/DL (ref 0.55–1.02)
DSDNA AB QUANT (OHS): 3.9 IU/ML
EOSINOPHIL # BLD AUTO: 0.01 X10(3)/MCL (ref 0–0.9)
EOSINOPHIL NFR BLD AUTO: 0.4 %
ERYTHROCYTE [DISTWIDTH] IN BLOOD BY AUTOMATED COUNT: 17 % (ref 11.5–17)
GFR SERPLBLD CREATININE-BSD FMLA CKD-EPI: >60 MLS/MIN/1.73/M2
GLOBULIN SER-MCNC: 2.4 GM/DL (ref 2.4–3.5)
GLUCOSE SERPL-MCNC: 95 MG/DL (ref 82–115)
HCT VFR BLD AUTO: 34.1 % (ref 37–47)
HGB BLD-MCNC: 11.5 G/DL (ref 12–16)
IMM GRANULOCYTES # BLD AUTO: 0.01 X10(3)/MCL (ref 0–0.04)
IMM GRANULOCYTES NFR BLD AUTO: 0.4 %
JO-1 AB QUANT (OHS): 0.7 U/ML
LYMPHOCYTES # BLD AUTO: 0.76 X10(3)/MCL (ref 0.6–4.6)
LYMPHOCYTES NFR BLD AUTO: 29 %
MCH RBC QN AUTO: 31.3 PG (ref 27–31)
MCHC RBC AUTO-ENTMCNC: 33.7 G/DL (ref 33–36)
MCV RBC AUTO: 92.9 FL (ref 80–94)
MONOCYTES # BLD AUTO: 0.43 X10(3)/MCL (ref 0.1–1.3)
MONOCYTES NFR BLD AUTO: 16.4 %
NEUTROPHILS # BLD AUTO: 1.4 X10(3)/MCL (ref 2.1–9.2)
NEUTROPHILS NFR BLD AUTO: 53.4 %
NRBC BLD AUTO-RTO: 0 %
PLATELET # BLD AUTO: 46 X10(3)/MCL (ref 130–400)
PMV BLD AUTO: 11 FL (ref 7.4–10.4)
POTASSIUM SERPL-SCNC: 3.9 MMOL/L (ref 3.5–5.1)
PROT SERPL-MCNC: 5 GM/DL (ref 5.8–7.6)
RBC # BLD AUTO: 3.67 X10(6)/MCL (ref 4.2–5.4)
RNP70 AB QUANT (OHS): 0.6 U/ML
SCL-70S AB QUANT (OHS): 1.3 U/ML
SMITH AB QUANT (OHS): 2.7 U/ML
SODIUM SERPL-SCNC: 140 MMOL/L (ref 136–145)
SSA(RO) AB QUANT (OHS): 0.7 U/ML
SSB(LA) AB QUANT (OHS): 0.6 U/ML
U1RNP AB QUANT (OHS): 1.6 U/ML
WBC # SPEC AUTO: 2.62 X10(3)/MCL (ref 4.5–11.5)

## 2023-08-11 PROCEDURE — 97530 THERAPEUTIC ACTIVITIES: CPT

## 2023-08-11 PROCEDURE — 80053 COMPREHEN METABOLIC PANEL: CPT | Performed by: INTERNAL MEDICINE

## 2023-08-11 PROCEDURE — 97116 GAIT TRAINING THERAPY: CPT

## 2023-08-11 PROCEDURE — 25000003 PHARM REV CODE 250: Performed by: INTERNAL MEDICINE

## 2023-08-11 PROCEDURE — 21400001 HC TELEMETRY ROOM

## 2023-08-11 PROCEDURE — 27000207 HC ISOLATION

## 2023-08-11 PROCEDURE — 85025 COMPLETE CBC W/AUTO DIFF WBC: CPT | Performed by: INTERNAL MEDICINE

## 2023-08-11 RX ADMIN — MUPIROCIN: 20 OINTMENT TOPICAL at 10:08

## 2023-08-11 RX ADMIN — LEVOTHYROXINE SODIUM 75 MCG: 25 TABLET ORAL at 04:08

## 2023-08-11 RX ADMIN — DOXYCYCLINE 100 MG: 100 INJECTION, POWDER, LYOPHILIZED, FOR SOLUTION INTRAVENOUS at 12:08

## 2023-08-11 RX ADMIN — ALPRAZOLAM 1 MG: 0.5 TABLET ORAL at 07:08

## 2023-08-11 RX ADMIN — PRAVASTATIN SODIUM 40 MG: 40 TABLET ORAL at 10:08

## 2023-08-11 RX ADMIN — HYDROCORTISONE 2.5%: 25 CREAM TOPICAL at 10:08

## 2023-08-11 NOTE — PROGRESS NOTES
[facility]  Hospital Medicine Progress Note        Chief Complaint: Inpatient Follow-up for COVID infection     HPI:   Tessa Dozier is a 82 y.o. female with a past medical history of essential hypertension, hyperlipidemia, and hypothyroidism presented to [facility] on 8/8/2023 for altered mental status.   reported to ED that patient has had increased confusion and lethargy since yesterday. Patient was seen in ED yesterday with primary complaint of weakness and fever and was diagnosed with COVID.  History is limited secondary to patient's medical condition and majority of the history was obtained from review of the medical record.  Initial vital signs in ED were /63, pulse 89, respirations 14, temperature 37.8° C, and SpO2 94% on room air.  Labs revealed WBC 2.28, RBC 3.48, hemoglobin 11.3, hematocrit 32.3, platelets 41, chloride 111, CO2 20, .8, and troponin 0.151.  Chest x-ray revealed small left pleural effusion. Patient was given IV Remdesivir 200 mg, IV dexamethasone 8 mg, and IV lasix 40 mg in ED. Patient was admitted to hospital medicine service for further medical management.     Patient was weak and PT was started. She was weak and needed assist to ambulate. Family wanting placement but patient wants to go home. CM coordinating dc plans with family.     Interval Hx:   Patient today awake and comfortable. She is sad and in tears. Asking to go home. She does have generalized weakness and needing assist to ambulate. PT working with PT.   She denies any SOB or chest pain. Has been afebrile.     No family at bedside. Per CM  is requesting a placement.     Objective/physical exam:  General: In no acute distress, Frail  Chest: Clear to auscultation bilaterally  Heart: RRR, +S1, S2, no appreciable murmur  Abdomen: Soft, nontender, BS +  MSK: Warm, no lower extremity edema, no clubbing or cyanosis  Neurologic: Cranial nerve II-XII intact, Strength 5/5 Jason UE,  Jason LE 3/5    VITAL SIGNS: 24 HRS MIN & MAX LAST   Temp  Min: 97.4 °F (36.3 °C)  Max: 99.3 °F (37.4 °C) 98 °F (36.7 °C)   BP  Min: 96/57  Max: 121/73 (!) 102/59   Pulse  Min: 50  Max: 66  61   Resp  Min: 16  Max: 16 16   SpO2  Min: 96 %  Max: 98 % 98 %     I have reviewed the following labs:    Recent Labs   Lab 08/09/23  0818 08/10/23  0400 08/11/23  0544   WBC 1.98* 2.56* 2.62*   RBC 3.43* 3.16* 3.67*   HGB 11.2* 10.3* 11.5*   HCT 31.3* 28.7* 34.1*   MCV 91.3 90.8 92.9   MCH 32.7* 32.6* 31.3*   MCHC 35.8 35.9 33.7   RDW 17.0 17.0 17.0   PLT 33* 37* 46*   MPV 10.5* 11.0* 11.0*       Recent Labs   Lab 08/07/23  1851 08/08/23  1156 08/09/23  0601 08/09/23  0818 08/09/23  0830 08/10/23  0400 08/11/23  0544      < > 145 138  --  140 140   K 4.1   < > 1.8* 3.5  --  3.4* 3.9   CO2 20*   < > 13* 21*  --  27 24   BUN 19.0   < > 14.8 24.3*  --  21.9* 15.1   CREATININE 0.78   < > 0.41* 0.77  --  0.70 0.64   CALCIUM 8.4   < > 4.1* 7.5*  --  7.7* 7.7*   PH  --   --   --   --  7.470*  --   --    MG 1.70  --   --  1.80  --  1.90  --    ALBUMIN 3.0*   < > 1.3* 2.4*  --   --  2.6*   ALKPHOS 61   < > 25* 45  --   --  45   ALT 21   < > 17 31  --   --  27   AST 38*   < > 41* 74*  --   --  62*   BILITOT 3.0*   < > 0.9 1.5  --   --  1.6*    < > = values in this interval not displayed.          Microbiology Results (last 7 days)       Procedure Component Value Units Date/Time    Blood Culture #1 **CANNOT BE ORDERED STAT** [[id number]]  (Normal) Collected: 08/08/23 1438    Order Status: Completed Specimen: Blood from Arm, Left Updated: 08/10/23 1600     CULTURE, BLOOD ([facility]) No Growth At 48 Hours    Blood Culture #2 **CANNOT BE ORDERED STAT** [[id number]]  (Normal) Collected: 08/08/23 1438    Order Status: Completed Specimen: Blood from Arm, Right Updated: 08/10/23 1600     CULTURE, BLOOD ([facility]) No Growth At 48 Hours    Respiratory Culture [[id number]]     Order Status: Sent Specimen: Sputum, Expectorated     Respiratory Culture  [[id number]]     Order Status: Sent Specimen: Sputum, Expectorated     Stool Culture [[id number]]     Order Status: Sent Specimen: Stool              See below for Radiology    Scheduled Med:   ALPRAZolam  1 mg Oral QHS    doxycycline (VIBRAMYCIN) IVPB  100 mg Intravenous Q12H    hydrocortisone   Rectal BID    levothyroxine  75 mcg Oral Before breakfast    mupirocin   Nasal BID    pravastatin  40 mg Oral Daily        Continuous Infusions:         PRN Meds:         Assessment/Plan:  Acute COVID-19 infection, + 08/07/2023   Sepsis secondary to above  NSTEMI, likely type 2   Acute metabolic encephalopathy: improved   Leukopenia   Thrombocytopenia   Normocytic anemia  Metabolic acidosis: improved   Essential hypertension  Hyperlipidemia  Hypothyroidism  Generalized weakness     Plan:  Patient has generalized weakness and needing assist to ambulate   Has been afebrile and O2 sats stable   Will cont PT   Dc iv doxy day 3  Completed 3 dose of remdesivir   Continue strict aspiration, fall and decubitus precautions    Current meds reviewed     VTE prophylaxis: scd    Patient condition:  Fair    Anticipated discharge and Disposition:   Home with  vs rehab       All diagnosis and differential diagnosis have been reviewed; assessment and plan has been documented; I have personally reviewed the labs and test results that are presently available; I have reviewed the patients medication list; I have reviewed the consulting providers response and recommendations. I have reviewed or attempted to review medical records based upon their availability    All of the patient's questions have been  addressed and answered. Patient's is agreeable to the above stated plan. I will continue to monitor closely and make adjustments to medical management as needed.  _____________________________________________________________________    Nutrition Status:    Radiology:  I have personally reviewed the following imaging and agree with the  radiologist.     CV Ultrasound doppler venous legs bilat  There was no evidence of deep or superficial vein thrombosis in the   bilateral lower extremities.      Luis Fitzgerald MD   08/11/2023

## 2023-08-11 NOTE — PT/OT/SLP PROGRESS
Physical Therapy Treatment    Patient Name:  Tessa Dozier   MRN:  [id number]    Recommendations:     Discharge Recommendations: rehabilitation facility  Discharge Equipment Recommendations: to be determined by next level of care  Barriers to discharge: Impaired mobility    Assessment:     Tessa Dozier is a 82 y.o. female admitted with a medical diagnosis of COVID(+), AMS, NSTEMI, CHF.  She presents with the following impairments/functional limitations: weakness, impaired endurance, impaired self care skills, impaired functional mobility, gait instability, impaired balance, impaired cognition, decreased safety awareness . Pt tearful throughout session saying she wants to go home. Reports her  also has COVID and he normally helps her around the house due to instability. CM reporting  wants placement for rehab so the patient can be more independent at home. PT discussed pt's current function with pt's  Bill on the phone and agree with him that placement would be appropriate, hopefully pt will agree. She could tolerate IPR over SNF.    Rehab Prognosis: Good; patient would benefit from acute skilled PT services to address these deficits and reach maximum level of function.    Recent Surgery: * No surgery found *      Plan:     During this hospitalization, patient to be seen 6 x/week to address the identified rehab impairments via gait training, therapeutic activities, therapeutic exercises, neuromuscular re-education and progress toward the following goals:    Plan of Care Expires:  09/10/23    Subjective     Chief Complaint: wants to go home  Patient/Family Comments/goals: walk better  Pain/Comfort:  Pain Rating 1: 0/10      Objective:     Communicated with RN prior to session.  Patient found HOB elevated with telemetry upon PT entry to room.     General Precautions: Standard, fall, droplet, contact  Orthopedic Precautions: N/A  Braces: N/A  Respiratory Status: Room air  Blood Pressure:  NT  Skin Integrity: Visible skin intact      Functional Mobility:  Bed Mobility:     Supine to Sit: minimum assistance  Sit to Supine: moderate assistance and of 2 persons  Transfers:     Sit to Stand:  minimum assistance with rolling walker  Toilet Transfer: minimum assistance with  grab bars  using  Stand Pivot  Gait: 75ft within room with several turns with initially minAx2 but progressed to [place] x1 with time. Decreased stance time on L. Instability noted with tremors, mildly ataxic.  Balance: Sitting balance on toilet = SBA. Dynamic standing balance washing hands without UE support = CGA    Therapeutic Activities/Exercises:  Voided urine on toilet and indep in pericare  Pt walked several laps around the room with RW with minAx1-2, stability improving with reps  Assisted patient in calling her  on the room phone as she was distraught over not speaking with him for several days.    Education:  Patient provided with verbal education regarding phone use, call light use, safety precautions, d/c recs for rehab.  Understanding was verbalized.     Patient left HOB elevated with all lines intact, call button in reach, bed alarm on, and CM/MD notified..    GOALS:   Multidisciplinary Problems       Physical Therapy Goals          Problem: Physical Therapy    Goal Priority Disciplines Outcome Goal Variances Interventions   Physical Therapy Goal     PT, PT/OT Ongoing, Progressing     Description: Goals to be met by: 9/10/23     Patient will increase functional independence with mobility by performin. Supine to sit with [place]  2. Sit to supine with [place]  3. Sit to stand transfer with Modified [place]  4. Gait  x 200 feet with Modified [place] using Rolling Walker.                          Time Tracking:     PT Received On: 23  PT Start Time: 1500     PT Stop Time: 1541  PT Total Time (min): 41 min     Billable Minutes: Gait Training 15 and Therapeutic Activity 26    Treatment Type:  Treatment  PT/PTA: PT     Number of PTA visits since last PT visit: 1 08/11/2023

## 2023-08-12 PROCEDURE — 27000207 HC ISOLATION

## 2023-08-12 PROCEDURE — 21400001 HC TELEMETRY ROOM

## 2023-08-12 PROCEDURE — 25000003 PHARM REV CODE 250: Performed by: INTERNAL MEDICINE

## 2023-08-12 RX ADMIN — HYDROCORTISONE 2.5%: 25 CREAM TOPICAL at 09:08

## 2023-08-12 RX ADMIN — LEVOTHYROXINE SODIUM 75 MCG: 25 TABLET ORAL at 04:08

## 2023-08-12 RX ADMIN — PRAVASTATIN SODIUM 40 MG: 40 TABLET ORAL at 08:08

## 2023-08-12 RX ADMIN — ALPRAZOLAM 1 MG: 0.5 TABLET ORAL at 09:08

## 2023-08-12 RX ADMIN — HYDROCORTISONE 2.5%: 25 CREAM TOPICAL at 08:08

## 2023-08-12 RX ADMIN — MUPIROCIN: 20 OINTMENT TOPICAL at 08:08

## 2023-08-12 RX ADMIN — MUPIROCIN: 20 OINTMENT TOPICAL at 09:08

## 2023-08-12 NOTE — PROGRESS NOTES
[facility]  Hospital Medicine Progress Note        Chief Complaint: Inpatient Follow-up for COVID infection     HPI:   Tessa Dozier is a 82 y.o. female with a past medical history of essential hypertension, hyperlipidemia, and hypothyroidism presented to [facility] on 8/8/2023 for altered mental status.   reported to ED that patient has had increased confusion and lethargy since yesterday. Patient was seen in ED yesterday with primary complaint of weakness and fever and was diagnosed with COVID.  History is limited secondary to patient's medical condition and majority of the history was obtained from review of the medical record.  Initial vital signs in ED were /63, pulse 89, respirations 14, temperature 37.8° C, and SpO2 94% on room air.  Labs revealed WBC 2.28, RBC 3.48, hemoglobin 11.3, hematocrit 32.3, platelets 41, chloride 111, CO2 20, .8, and troponin 0.151.  Chest x-ray revealed small left pleural effusion. Patient was given IV Remdesivir 200 mg, IV dexamethasone 8 mg, and IV lasix 40 mg in ED. Patient was admitted to hospital medicine service for further medical management.     Patient was weak and PT was started. She was weak and needed assist to ambulate. Family wanting placement but patient wants to go home. CM coordinating dc plans with family.     Interval Hx:   Patient today awake and comfortable. She is still weak and needing assist to ambulate. She has been afebrile.  at bedside, states he is unable to take care of her at home.   Requesting placement in rehab.     Objective/physical exam:  General: In no acute distress, Frail  Chest: Clear to auscultation bilaterally  Heart: RRR, +S1, S2, no appreciable murmur  Abdomen: Soft, nontender, BS +  MSK: Warm, no lower extremity edema, no clubbing or cyanosis  Neurologic: Cranial nerve II-XII intact, Strength 5/5 Jason UE, Jason LE 3/5    VITAL SIGNS: 24 HRS MIN & MAX LAST   Temp  Min: 97 °F (36.1 °C)   Max: 98.6 °F (37 °C) 97.6 °F (36.4 °C)   BP  Min: 93/58  Max: 117/76 107/71   Pulse  Min: 61  Max: 75  63   Resp  Min: 20  Max: 20 20   SpO2  Min: 94 %  Max: 98 % 96 %     I have reviewed the following labs:    Recent Labs   Lab 08/09/23  0818 08/10/23  0400 08/11/23  0544   WBC 1.98* 2.56* 2.62*   RBC 3.43* 3.16* 3.67*   HGB 11.2* 10.3* 11.5*   HCT 31.3* 28.7* 34.1*   MCV 91.3 90.8 92.9   MCH 32.7* 32.6* 31.3*   MCHC 35.8 35.9 33.7   RDW 17.0 17.0 17.0   PLT 33* 37* 46*   MPV 10.5* 11.0* 11.0*       Recent Labs   Lab 08/07/23  1851 08/08/23  1156 08/09/23  0601 08/09/23  0818 08/09/23  0830 08/10/23  0400 08/11/23  0544      < > 145 138  --  140 140   K 4.1   < > 1.8* 3.5  --  3.4* 3.9   CO2 20*   < > 13* 21*  --  27 24   BUN 19.0   < > 14.8 24.3*  --  21.9* 15.1   CREATININE 0.78   < > 0.41* 0.77  --  0.70 0.64   CALCIUM 8.4   < > 4.1* 7.5*  --  7.7* 7.7*   PH  --   --   --   --  7.470*  --   --    MG 1.70  --   --  1.80  --  1.90  --    ALBUMIN 3.0*   < > 1.3* 2.4*  --   --  2.6*   ALKPHOS 61   < > 25* 45  --   --  45   ALT 21   < > 17 31  --   --  27   AST 38*   < > 41* 74*  --   --  62*   BILITOT 3.0*   < > 0.9 1.5  --   --  1.6*    < > = values in this interval not displayed.          Microbiology Results (last 7 days)       Procedure Component Value Units Date/Time    Blood Culture #1 **CANNOT BE ORDERED STAT** [[id number]]  (Normal) Collected: 08/08/23 1438    Order Status: Completed Specimen: Blood from Arm, Left Updated: 08/11/23 1600     CULTURE, BLOOD ([facility]) No Growth At 72 Hours    Blood Culture #2 **CANNOT BE ORDERED STAT** [[id number]]  (Normal) Collected: 08/08/23 1438    Order Status: Completed Specimen: Blood from Arm, Right Updated: 08/11/23 1600     CULTURE, BLOOD ([facility]) No Growth At 72 Hours    Respiratory Culture [[id number]]     Order Status: Sent Specimen: Sputum, Expectorated     Respiratory Culture [[id number]]     Order Status: Sent Specimen: Sputum, Expectorated     Stool Culture  [[id number]]     Order Status: Sent Specimen: Stool              See below for Radiology    Scheduled Med:   ALPRAZolam  1 mg Oral QHS    hydrocortisone   Rectal BID    levothyroxine  75 mcg Oral Before breakfast    mupirocin   Nasal BID    pravastatin  40 mg Oral Daily        Continuous Infusions:         PRN Meds:         Assessment/Plan:  Acute COVID-19 infection, + 08/07/2023   Sepsis secondary to above  NSTEMI, likely type 2   Acute metabolic encephalopathy: improved   Leukopenia   Thrombocytopenia   Normocytic anemia  Metabolic acidosis: improved   Essential hypertension  Hyperlipidemia  Hypothyroidism  Generalized weakness     Plan:  Patient looks comfortable and in no respiratory distress  Has been afebrile and O2 sats stable   Will cont PT   Continue strict aspiration, fall and decubitus precautions    Current meds reviewed     VTE prophylaxis: scd    Patient condition:  Fair    Anticipated discharge and Disposition:   Rehab       All diagnosis and differential diagnosis have been reviewed; assessment and plan has been documented; I have personally reviewed the labs and test results that are presently available; I have reviewed the patients medication list; I have reviewed the consulting providers response and recommendations. I have reviewed or attempted to review medical records based upon their availability    All of the patient's questions have been  addressed and answered. Patient's is agreeable to the above stated plan. I will continue to monitor closely and make adjustments to medical management as needed.  _____________________________________________________________________    Nutrition Status:    Radiology:  I have personally reviewed the following imaging and agree with the radiologist.     CV Ultrasound doppler venous legs bilat  There was no evidence of deep or superficial vein thrombosis in the   bilateral lower extremities.      Luis Fitzgerald MD   08/12/2023

## 2023-08-13 LAB
BACTERIA BLD CULT: NORMAL
BACTERIA BLD CULT: NORMAL

## 2023-08-13 PROCEDURE — 21400001 HC TELEMETRY ROOM

## 2023-08-13 PROCEDURE — 97530 THERAPEUTIC ACTIVITIES: CPT | Mod: CQ

## 2023-08-13 PROCEDURE — 25000003 PHARM REV CODE 250: Performed by: INTERNAL MEDICINE

## 2023-08-13 PROCEDURE — 27000207 HC ISOLATION

## 2023-08-13 PROCEDURE — 97116 GAIT TRAINING THERAPY: CPT | Mod: CQ

## 2023-08-13 PROCEDURE — 97110 THERAPEUTIC EXERCISES: CPT | Mod: CQ

## 2023-08-13 RX ADMIN — ALPRAZOLAM 1 MG: 0.5 TABLET ORAL at 10:08

## 2023-08-13 RX ADMIN — LEVOTHYROXINE SODIUM 75 MCG: 25 TABLET ORAL at 05:08

## 2023-08-13 RX ADMIN — PRAVASTATIN SODIUM 40 MG: 40 TABLET ORAL at 08:08

## 2023-08-13 RX ADMIN — HYDROCORTISONE 2.5%: 25 CREAM TOPICAL at 10:08

## 2023-08-13 NOTE — PT/OT/SLP PROGRESS
Physical Therapy Treatment    Patient Name:  Tessa Dozier   MRN:  [id number]    Recommendations:     Discharge Recommendations: rehabilitation facility  Discharge Equipment Recommendations: to be determined by next level of care  Barriers to discharge:       Assessment:     Tessa Dozier is a 83 y.o. female admitted with a medical diagnosis of COVID.  She presents with the following impairments/functional limitations: weakness, gait instability, impaired endurance, impaired balance, impaired self care skills, impaired functional mobility, decreased safety awareness.    Rehab Prognosis: Good; patient would benefit from acute skilled PT services to address these deficits and reach maximum level of function.    Recent Surgery: * No surgery found *      Plan:     During this hospitalization, patient to be seen 6 x/week to address the identified rehab impairments via gait training, therapeutic activities, therapeutic exercises, neuromuscular re-education and progress toward the following goals:    Plan of Care Expires:  09/10/23    Subjective     Chief Complaint: Pt reports no pain, pt states today is her birthday and she just wants to go home.   Patient/Family Comments/goals: Pt spouse present and states that he wants his wife to get stronger before going home.   Pain/Comfort:  Pain Rating 1: 0/10      Objective:     Communicated with NSG prior to session.  Patient found HOB elevated with telemetry, peripheral IV upon PTA entry to room.     General Precautions: Standard, droplet, fall  Orthopedic Precautions: N/A  Braces: N/A  Respiratory Status:     Functional Mobility:  Bed: ModA supine to sit EOB, Suzanne roll L and R for pericare  T/F with RW: Suzanne sit <-> stand with RW, Suzanne toilet t/f   Gait trial with rollator and RW: 40 ft with rollator, 15 ft with RW  Pt ambulated several lapse in room. Pt required safety cues with managing personal rollator, performed better with use of RW. Pt demo step through gait  pattern, slow woodrow, cues for RW proximity and to avoid bumping objects with AD. CGA to Suzanne for safety.     Therapeutic Activities/Exercises:  B LE therex x 10 reps: LAQ, hamstring curls, hip flexion, hip abduction  B UE scapular retraction and protraction x 10 reps     Education:  Patient provided with verbal education regarding safety.  Understanding was verbalized.     Patient left up in chair with all lines intact, call button in reach, and  present and NSG aware.   GOALS:   Multidisciplinary Problems       Physical Therapy Goals          Problem: Physical Therapy    Goal Priority Disciplines Outcome Goal Variances Interventions   Physical Therapy Goal     PT, PT/OT Ongoing, Progressing     Description: Goals to be met by: 9/10/23     Patient will increase functional independence with mobility by performin. Supine to sit with [place]  2. Sit to supine with [place]  3. Sit to stand transfer with Modified [place]  4. Gait  x 200 feet with Modified [place] using Rolling Walker.                          Time Tracking:     PT Received On:    PT Start Time: 1155     PT Stop Time: 1236  PT Total Time (min): 41 min     Billable Minutes: Gait Training 15, Therapeutic Activity 15, and Therapeutic Exercise 11    Treatment Type: Treatment  PT/PTA: PTA     Number of PTA visits since last PT visit: 2     2023

## 2023-08-13 NOTE — PROGRESS NOTES
[facility]  Hospital Medicine Progress Note        Chief Complaint: Inpatient Follow-up for COVID infection     HPI:   Tessa Dozier is a 82 y.o. female with a past medical history of essential hypertension, hyperlipidemia, and hypothyroidism presented to [facility] on 8/8/2023 for altered mental status.   reported to ED that patient has had increased confusion and lethargy since yesterday. Patient was seen in ED yesterday with primary complaint of weakness and fever and was diagnosed with COVID.  History is limited secondary to patient's medical condition and majority of the history was obtained from review of the medical record.  Initial vital signs in ED were /63, pulse 89, respirations 14, temperature 37.8° C, and SpO2 94% on room air.  Labs revealed WBC 2.28, RBC 3.48, hemoglobin 11.3, hematocrit 32.3, platelets 41, chloride 111, CO2 20, .8, and troponin 0.151.  Chest x-ray revealed small left pleural effusion. Patient was given IV Remdesivir 200 mg, IV dexamethasone 8 mg, and IV lasix 40 mg in ED. Patient was admitted to hospital medicine service for further medical management.     Patient was weak and PT was started. She was weak and needed assist to ambulate. Family wanting placement but patient wants to go home. CM coordinating dc plans with family.     Interval Hx:   Patient today awake and comfortable. Still feeling weak and needing help to ambulate. Has a moderate appetite, Has been afebrile. Denies any SOB or chest pain.    Objective/physical exam:  General: In no acute distress, Frail  Chest: Clear to auscultation bilaterally  Heart: RRR, +S1, S2, no appreciable murmur  Abdomen: Soft, nontender, BS +  MSK: Warm, no lower extremity edema, no clubbing or cyanosis  Neurologic: Cranial nerve II-XII intact, Strength 5/5 Jason UE, Jason LE 3/5    VITAL SIGNS: 24 HRS MIN & MAX LAST   Temp  Min: 97.5 °F (36.4 °C)  Max: 98.6 °F (37 °C) 97.5 °F (36.4 °C)   BP  Min:  106/68  Max: 131/78 106/68   Pulse  Min: 63  Max: 76  67   Resp  Min: 18  Max: 20 18   SpO2  Min: 94 %  Max: 96 % 96 %     I have reviewed the following labs:    Recent Labs   Lab 08/09/23  0818 08/10/23  0400 08/11/23  0544   WBC 1.98* 2.56* 2.62*   RBC 3.43* 3.16* 3.67*   HGB 11.2* 10.3* 11.5*   HCT 31.3* 28.7* 34.1*   MCV 91.3 90.8 92.9   MCH 32.7* 32.6* 31.3*   MCHC 35.8 35.9 33.7   RDW 17.0 17.0 17.0   PLT 33* 37* 46*   MPV 10.5* 11.0* 11.0*       Recent Labs   Lab 08/07/23  1851 08/08/23  1156 08/09/23  0601 08/09/23  0818 08/09/23  0830 08/10/23  0400 08/11/23  0544      < > 145 138  --  140 140   K 4.1   < > 1.8* 3.5  --  3.4* 3.9   CO2 20*   < > 13* 21*  --  27 24   BUN 19.0   < > 14.8 24.3*  --  21.9* 15.1   CREATININE 0.78   < > 0.41* 0.77  --  0.70 0.64   CALCIUM 8.4   < > 4.1* 7.5*  --  7.7* 7.7*   PH  --   --   --   --  7.470*  --   --    MG 1.70  --   --  1.80  --  1.90  --    ALBUMIN 3.0*   < > 1.3* 2.4*  --   --  2.6*   ALKPHOS 61   < > 25* 45  --   --  45   ALT 21   < > 17 31  --   --  27   AST 38*   < > 41* 74*  --   --  62*   BILITOT 3.0*   < > 0.9 1.5  --   --  1.6*    < > = values in this interval not displayed.          Microbiology Results (last 7 days)       Procedure Component Value Units Date/Time    Blood Culture #1 **CANNOT BE ORDERED STAT** [[id number]]  (Normal) Collected: 08/08/23 1438    Order Status: Completed Specimen: Blood from Arm, Left Updated: 08/12/23 1600     CULTURE, BLOOD ([facility]) No Growth At 96 Hours    Blood Culture #2 **CANNOT BE ORDERED STAT** [[id number]]  (Normal) Collected: 08/08/23 1438    Order Status: Completed Specimen: Blood from Arm, Right Updated: 08/12/23 1600     CULTURE, BLOOD ([facility]) No Growth At 96 Hours    Respiratory Culture [[id number]]     Order Status: Sent Specimen: Sputum, Expectorated     Respiratory Culture [[id number]]     Order Status: Sent Specimen: Sputum, Expectorated     Stool Culture [[id number]]     Order Status: Sent Specimen: Stool               See below for Radiology    Scheduled Med:   ALPRAZolam  1 mg Oral QHS    hydrocortisone   Rectal BID    levothyroxine  75 mcg Oral Before breakfast    mupirocin   Nasal BID    pravastatin  40 mg Oral Daily        Continuous Infusions:         PRN Meds:         Assessment/Plan:  Acute COVID-19 infection, + 08/07/2023   Sepsis secondary to above  NSTEMI, likely type 2   Acute metabolic encephalopathy: improved   Leukopenia   Thrombocytopenia   Normocytic anemia  Metabolic acidosis: improved   Essential hypertension  Hyperlipidemia  Hypothyroidism  Generalized weakness     Plan:  Patient has no new issues. Has persistent weakness and needing assist with ADLs.    has also noticed cognitive decline in the past few months. Will have OT or ST evaluate cognition in am   Has been afebrile and O2 sats stable   Will cont PT   Continue strict aspiration, fall and decubitus precautions    Current meds reviewed     Keep on isolation day 7/10    VTE prophylaxis: scd    Patient condition:  Fair    Anticipated discharge and Disposition:   Rehab       All diagnosis and differential diagnosis have been reviewed; assessment and plan has been documented; I have personally reviewed the labs and test results that are presently available; I have reviewed the patients medication list; I have reviewed the consulting providers response and recommendations. I have reviewed or attempted to review medical records based upon their availability    All of the patient's questions have been  addressed and answered. Patient's is agreeable to the above stated plan. I will continue to monitor closely and make adjustments to medical management as needed.  _____________________________________________________________________    Nutrition Status:    Radiology:  I have personally reviewed the following imaging and agree with the radiologist.     CV Ultrasound doppler venous legs bilat  There was no evidence of deep or superficial vein  thrombosis in the   bilateral lower extremities.      Luis Fitzgerald MD   08/13/2023

## 2023-08-14 ENCOUNTER — TELEPHONE (OUTPATIENT)
Dept: FAMILY MEDICINE | Facility: CLINIC | Age: 83
End: 2023-08-14
Payer: MEDICARE

## 2023-08-14 VITALS
RESPIRATION RATE: 18 BRPM | HEIGHT: 63 IN | HEART RATE: 72 BPM | DIASTOLIC BLOOD PRESSURE: 65 MMHG | TEMPERATURE: 98 F | BODY MASS INDEX: 19.41 KG/M2 | SYSTOLIC BLOOD PRESSURE: 116 MMHG | OXYGEN SATURATION: 95 % | WEIGHT: 109.56 LBS

## 2023-08-14 PROCEDURE — 25000003 PHARM REV CODE 250: Performed by: INTERNAL MEDICINE

## 2023-08-14 RX ORDER — DOCUSATE SODIUM 100 MG/1
100 CAPSULE, LIQUID FILLED ORAL 2 TIMES DAILY
Refills: 0
Start: 2023-08-14 | End: 2023-09-05

## 2023-08-14 RX ORDER — POLYETHYLENE GLYCOL 3350 17 G/17G
17 POWDER, FOR SOLUTION ORAL DAILY
Status: DISCONTINUED | OUTPATIENT
Start: 2023-08-14 | End: 2023-08-14 | Stop reason: HOSPADM

## 2023-08-14 RX ORDER — BISACODYL 10 MG
10 SUPPOSITORY, RECTAL RECTAL DAILY PRN
Status: DISCONTINUED | OUTPATIENT
Start: 2023-08-14 | End: 2023-08-14 | Stop reason: HOSPADM

## 2023-08-14 RX ORDER — POLYETHYLENE GLYCOL 3350 17 G/17G
17 POWDER, FOR SOLUTION ORAL DAILY
Refills: 0
Start: 2023-08-14 | End: 2023-09-05

## 2023-08-14 RX ORDER — HYDROCORTISONE 25 MG/G
CREAM TOPICAL 2 TIMES DAILY
Start: 2023-08-14 | End: 2023-09-05 | Stop reason: SDUPTHER

## 2023-08-14 RX ORDER — DOCUSATE SODIUM 100 MG/1
100 CAPSULE, LIQUID FILLED ORAL 2 TIMES DAILY
Status: DISCONTINUED | OUTPATIENT
Start: 2023-08-14 | End: 2023-08-14 | Stop reason: HOSPADM

## 2023-08-14 RX ADMIN — POLYETHYLENE GLYCOL 3350 17 G: 17 POWDER, FOR SOLUTION ORAL at 11:08

## 2023-08-14 RX ADMIN — LEVOTHYROXINE SODIUM 75 MCG: 25 TABLET ORAL at 06:08

## 2023-08-14 RX ADMIN — DOCUSATE SODIUM 100 MG: 100 CAPSULE, LIQUID FILLED ORAL at 11:08

## 2023-08-14 RX ADMIN — HYDROCORTISONE 2.5%: 25 CREAM TOPICAL at 10:08

## 2023-08-14 RX ADMIN — PRAVASTATIN SODIUM 40 MG: 40 TABLET ORAL at 10:08

## 2023-08-14 RX ADMIN — BISACODYL 10 MG: 10 SUPPOSITORY RECTAL at 10:08

## 2023-08-14 NOTE — PLAN OF CARE
08/14/23 1212   Final Note   Assessment Type Final Discharge Note   Anticipated Discharge Disposition Home-Health   Hospital Resources/Appts/Education Provided Post-Acute resouces added to AVS   Post-Acute Status   Post-Acute Authorization Home Health   Home Health Status Set-up Complete/Auth obtained   Patient choice form signed by patient/caregiver List with quality metrics by geographic area provided   Discharge Delays None known at this time     Spoke to spouse via phone about home health. [place] of choice obtained. Referral sent via [facility].

## 2023-08-14 NOTE — DISCHARGE SUMMARY
[facility] Medicine Discharge Summary    Admit Date: 8/8/2023  Discharge Date and Time: 8/14/202311:10 AM  Admitting Physician:  Team  Discharging Physician: Luis Fitzgerald MD.  Primary Care Physician: Angélica Dominguez MD      Discharge Diagnoses:  Acute COVID-19 infection, + 08/07/2023   Sepsis secondary to above  NSTEMI, likely type 2   Acute metabolic encephalopathy: improved   Leukopenia   Thrombocytopenia   Normocytic anemia  Metabolic acidosis: improved   Essential hypertension  Hyperlipidemia  Hypothyroidism  Generalized weakness     Hospital Course:   Tessa Dozier is a 82 y.o. female with a past medical history of essential hypertension, hyperlipidemia, and hypothyroidism presented to [facility] on 8/8/2023 for altered mental status.   reported to ED that patient has had increased confusion and lethargy since yesterday. Patient was seen in ED yesterday with primary complaint of weakness and fever and was diagnosed with COVID.  History is limited secondary to patient's medical condition and majority of the history was obtained from review of the medical record.  Initial vital signs in ED were /63, pulse 89, respirations 14, temperature 37.8° C, and SpO2 94% on room air.  Labs revealed WBC 2.28, RBC 3.48, hemoglobin 11.3, hematocrit 32.3, platelets 41, chloride 111, CO2 20, .8, and troponin 0.151.  Chest x-ray revealed small left pleural effusion. Patient was given IV Remdesivir 200 mg, IV dexamethasone 8 mg, and IV lasix 40 mg in ED. Patient was admitted to hospital medicine service for further medical management.      Patient was weak and PT was started. She was weak and needed assist to ambulate. Family wanting placement but patient wants to go home. Patient was participating with PT. Ambulating to the rest room. Her respiratory status was stable. Patients  was later ok to take her home with HH and PT.   This was arranged and  she was discharged home in a stable condition. Added bowel regimen for constipation.      Pt was seen and examined on the day of discharge  Vitals:  VITAL SIGNS: 24 HRS MIN & MAX LAST   Temp  Min: 97.6 °F (36.4 °C)  Max: 98.8 °F (37.1 °C) 98.1 °F (36.7 °C)   BP  Min: 110/61  Max: 125/69 116/65   Pulse  Min: 69  Max: 75  72   Resp  Min: 18  Max: 18 18   SpO2  Min: 94 %  Max: 96 % 95 %       Physical Exam:  Heart RRR  Lungs clear   Abdomen soft and non tender   Neuro: No FND      Procedures Performed: No admission procedures for hospital encounter.     Significant Diagnostic Studies: See Full reports for all details    Recent Labs   Lab 08/09/23  0818 08/10/23  0400 08/11/23  0544   WBC 1.98* 2.56* 2.62*   RBC 3.43* 3.16* 3.67*   HGB 11.2* 10.3* 11.5*   HCT 31.3* 28.7* 34.1*   MCV 91.3 90.8 92.9   MCH 32.7* 32.6* 31.3*   MCHC 35.8 35.9 33.7   RDW 17.0 17.0 17.0   PLT 33* 37* 46*   MPV 10.5* 11.0* 11.0*       Recent Labs   Lab 08/07/23  1851 08/08/23  1156 08/09/23  0601 08/09/23  0818 08/09/23  0830 08/10/23  0400 08/11/23  0544      < > 145 138  --  140 140   K 4.1   < > 1.8* 3.5  --  3.4* 3.9   CO2 20*   < > 13* 21*  --  27 24   BUN 19.0   < > 14.8 24.3*  --  21.9* 15.1   CREATININE 0.78   < > 0.41* 0.77  --  0.70 0.64   CALCIUM 8.4   < > 4.1* 7.5*  --  7.7* 7.7*   PH  --   --   --   --  7.470*  --   --    MG 1.70  --   --  1.80  --  1.90  --    ALBUMIN 3.0*   < > 1.3* 2.4*  --   --  2.6*   ALKPHOS 61   < > 25* 45  --   --  45   ALT 21   < > 17 31  --   --  27   AST 38*   < > 41* 74*  --   --  62*   BILITOT 3.0*   < > 0.9 1.5  --   --  1.6*    < > = values in this interval not displayed.        Microbiology Results (last 7 days)       Procedure Component Value Units Date/Time    Blood Culture #1 **CANNOT BE ORDERED STAT** [[id number]]  (Normal) Collected: 08/08/23 1438    Order Status: Completed Specimen: Blood from Arm, Left Updated: 08/13/23 1600     CULTURE, BLOOD ([facility]) No Growth at 5 days    Blood Culture  #2 **CANNOT BE ORDERED STAT** [[id number]]  (Normal) Collected: 08/08/23 1438    Order Status: Completed Specimen: Blood from Arm, Right Updated: 08/13/23 1600     CULTURE, BLOOD ([facility]) No Growth at 5 days    Respiratory Culture [[id number]]     Order Status: Sent Specimen: Sputum, Expectorated     Respiratory Culture [[id number]]     Order Status: Sent Specimen: Sputum, Expectorated     Stool Culture [[id number]]     Order Status: Sent Specimen: Stool              CV Ultrasound doppler venous legs bilat  There was no evidence of deep or superficial vein thrombosis in the   bilateral lower extremities.         Medication List        START taking these medications      docusate sodium 100 MG capsule  Commonly known as: COLACE  Take 1 capsule (100 mg total) by mouth 2 (two) times daily.     hydrocortisone 2.5 % rectal cream  Commonly known as: ANUSOL-HC  Place rectally 2 (two) times daily.     polyethylene glycol 17 gram Pwpk  Commonly known as: GLYCOLAX  Take 17 g by mouth once daily.            CHANGE how you take these medications      levothyroxine 75 MCG tablet  Commonly known as: SYNTHROID  Take 1 tablet (75 mcg total) by mouth before breakfast.  What changed: Another medication with the same name was removed. Continue taking this medication, and follow the directions you see here.            CONTINUE taking these medications      ALPRAZolam 2 MG Tab  Commonly known as: XANAX  Take 1 tablet (2 mg total) by mouth every 24 hours as needed (anxiety).     carvediloL 3.125 MG tablet  Commonly known as: COREG  TAKE 1 TABLET BY MOUTH TWICE A DAY     pravastatin 40 MG tablet  Commonly known as: PRAVACHOL            STOP taking these medications      vibegron 75 mg Tab               Where to Get Your Medications        Information about where to get these medications is not yet available    Ask your nurse or doctor about these medications  docusate sodium 100 MG capsule  hydrocortisone 2.5 % rectal cream  polyethylene glycol  17 gram Pwpk          Explained in detail to the patient about the discharge plan, medications, and follow-up visits. Pt understands and agrees with the treatment plan  Discharge Disposition: Home with HH   Discharged Condition: stable  Diet-   Dietary Orders (From admission, onward)       Start     Ordered    08/13/23 0949  Dietary nutrition supplements Boost Strawberry; All Meals  Continuous        Question Answer Comment   Select PO Supplement: Boost Strawberry    Frequency: All Meals        08/13/23 0949    08/12/23 1624  Diet Adult Regular Isolation Tray - Styrofoam  Diet effective now        Question:  Tray type:  Answer:  Isolation Tray - Styrofoam    08/12/23 1624    08/09/23 1817  Dietary nutrition supplements Banatrol Plus Banana Flakes; TID  Continuous        Question Answer Comment   Select PO Supplement: Banatrol Plus Banana Flakes    Frequency: TID        08/09/23 1816                   Medications Per DC med rec  Activities as tolerated   Follow-up Information       Jamey Quesada MD Follow up.    Specialties: Cardiovascular Disease, Cardiology  Why: 1-2 week follow-up   ECHO in 6 weeks  CBC and CMP in 3-5 days  Contact information:  [address] [place] [id number]  [phone]               Angélica Dominguez MD Follow up in 2 week(s).    Specialty: Family Medicine  Contact information:  [address]  [address]  [facility] [id number] [phone]                           For further questions contact hospitalist office    Discharge time 33 minutes    For worsening symptoms, chest pain, shortness of breath, increased abdominal pain, high grade fever, stroke or stroke like symptoms, immediately go to the nearest Emergency Room or call 911 as soon as possible.      Luis Ortiz M.D on 8/14/2023. at 11:10 AM.

## 2023-08-14 NOTE — TELEPHONE ENCOUNTER
----- Message from Alfie Thompson sent at 8/14/2023  1:07 PM CDT -----  .Type:  Needs Medical Advice    Who Called: [facility]   Symptoms (please be specific):    How long has patient had these symptoms:    Pharmacy name and phone #:    Would the patient rather a call back or a response via [facility]?   Best Call Back Number: [phone]  Additional Information: She needs the nurse to call her back re: this patient.  She is being discharged from [facility] and she wanted to make sure that she could put home care service in with Dr. Dominguez's approval.

## 2023-08-14 NOTE — DISCHARGE INSTRUCTIONS
RN provided discharge instructions and answered all questions. Pt and , primary caretaker, at bedside. Verbalizes understanding. Transport transported pt to private vehicle.

## 2023-08-15 DIAGNOSIS — F41.1 GENERALIZED ANXIETY DISORDER: ICD-10-CM

## 2023-08-15 PROCEDURE — G0180 MD CERTIFICATION HHA PATIENT: HCPCS | Mod: ,,, | Performed by: FAMILY MEDICINE

## 2023-08-15 PROCEDURE — G0180 PR HOME HEALTH MD CERTIFICATION: ICD-10-PCS | Mod: ,,, | Performed by: FAMILY MEDICINE

## 2023-08-15 RX ORDER — ALPRAZOLAM 2 MG/1
2 TABLET ORAL
Qty: 30 TABLET | Refills: 2 | Status: SHIPPED | OUTPATIENT
Start: 2023-08-15 | End: 2023-09-20 | Stop reason: SDUPTHER

## 2023-08-16 ENCOUNTER — PATIENT MESSAGE (OUTPATIENT)
Dept: ADMINISTRATIVE | Facility: OTHER | Age: 83
End: 2023-08-16
Payer: MEDICARE

## 2023-08-16 NOTE — PHYSICIAN QUERY
PT Name: Tessa Dozier  MR #: [id number]     DOCUMENTATION CLARIFICATION      CDS/: Queta Arias RN CDIS            Contact information: [email or web address]    This form is a permanent document in the medical record.    Query Date: August 16, 2023    By submitting this query, we are merely seeking further clarification of documentation to reflect the severity of illness of your patient. Please utilize your independent clinical judgment when addressing the question(s) below.     The Medical Record contains the following:   Indicators   Supporting Clinical Findings Location in Medical Record    Chest Pain, Angina      Coronary Artery Disease     x EKG Normal sinus rhythm   Indeterminate axis   Low voltage QRS   Right bundle branch block   Abnormal ECG   When compared with ECG of 07-AUG-2023 19:30,   No significant change was found   Confirmed by Abdulaziz Marino MD ([id number]) on 8/8/2023 3:19:02 PM  EKG 8/8    x Troponin  Latest Reference Range & Units 08/08/23 11:56 08/08/23 18:05 08/09/23 00:02 08/09/23 06:01   Troponin I 0.000 - 0.045 ng/mL 0.151 (H) 0.131 (H) 0.078 (H) 0.049 (H)    Labs    x Echo Results   Left Ventricle: The left ventricle is normal in size. Normal wall thickness. Normal wall motion. There is normal systolic function with a visually estimated ejection fraction of 55 - 60%.    Right Ventricle: Normal right ventricular cavity size. Systolic function is normal.    Aortic Valve: The aortic valve is a trileaflet valve.    IVC/SVC: Normal venous pressure at 3 mmHg.    Breast implants noted. ECHO 8/8    Angiography     x Documentation of acute cardiac condition Acute COVID-19 infection, + 08/07/2023   Sepsis secondary to above  NSTEMI, likely type 2     NSTEMI - likely type 2 in the setting of COVID 19 Discharge summary         Cards consult     Medication/Treatment      Other          Due to the conflicting clinical picture, please clinically validate the diagnosis of _Type 2 NSTEMI _.    If  validated, please provide additional clinical support for the diagnosis.       [   ] Type 2 NSTEMI  is not confirmed and/or it has been ruled out   [  x ] Type 2 NSTEMI is not confirmed and/or it has been ruled out, other diagnosis ruled in (please          specify):  [ x  ] Non ischemic Acute Myocardial Injury, due to COVID 19    [   ] Elevated troponin only (without corresponding diagnosis)   [   ] Other Cardiac Diagnosis (please specify): _______________   _______________   [   ] Type 2 NSTEMI  is confirmed. Please specify clinical support (signs, symptoms, and treatment) for  the confirmed diagnosis: ____________________     [   ] Other clarification (please specify): ___________________           Please document in your progress notes daily for the duration of treatment until resolved, and include in your discharge summary.    Form No. [id number]

## 2023-08-16 NOTE — PHYSICIAN QUERY
PT Name: Tessa Dozier  MR #: [id number]     DOCUMENTATION CLARIFICATION     CDS/: Queta rAias RN CDIS            Contact information: [email or web address]    This form is a permanent document in the medical record.    Query Date: August 16, 2023    By submitting this query, we are merely seeking further clarification of documentation to reflect the severity of illness of your patient. Please utilize your independent clinical judgment when addressing the question(s) below.    The Medical Record reflects the following:     Indicators   Supporting Clinical Findings Location in Medical Record   x Lab Value(s)  Latest Reference Range & Units 08/08/23 11:56 08/09/23 06:01 08/09/23 08:18 08/10/23 04:00 08/11/23 05:44   Potassium 3.5 - 5.1 mmol/L 3.9 1.8 (LL) 3.5 3.4 (L) 3.9    Labs    x Treatment/Medication potassium chloride 20 mEq in 100 mL IVPB (FOR CENTRAL LINE ADMINISTRATION ONLY)  Dose: 40 mEq  Freq: Once Route: IV  Start: 08/09/23 0815 End: 08/09/23 1000    potassium chloride SA CR tablet 40 mEq  Dose: 40 mEq  Freq: Once Route: Oral  Start: 08/10/23 1415 End: 08/10/23 1457 MAR     Other       Provider, please specify the diagnosis or diagnoses that correspond(s) to the above indicators. Donis all that apply:    [  x ] Hypokalemia   [   ] Other electrolyte disturbance (please specify): __________       Please document in your progress notes daily for the duration of treatment until resolved, and include in your discharge summary.

## 2023-08-16 NOTE — PHYSICIAN QUERY
PT Name: Tessa Dozier  MR #: [id number]     Documentation Clarification      CDS/: Queta Arias RN CDIS           Contact information: [email or web address]  This form is a permanent document in the medical record.     Query Date: August 16, 2023    By submitting this query, we are merely seeking further clarification of documentation. Please utilize your independent clinical judgment when addressing the question(s) below.    The Medical Record reflects the following:    Clinical Findings Location in Medical Records   Acute COVID-19 infection, + 08/07/2023   Sepsis secondary to above    VITAL SIGNS: 24 HRS MIN & MAX  Temp  Min: 98.9 °F (37.2 °C)  Max: 100.3 °F (37.9 °C)  BP  Min: 117/63  Max: 126/62  Pulse  Min: 89  Max: 97  SpO2  Min: 94 %  Max: 98 %    VITAL SIGNS: 24 HRS MIN & MAX  Temp  Min: 96.7 °F (35.9 °C)  Max: 100 °F (37.8 °C)  BP  Min: 82/47  Max: 118/63  Pulse  Min: 58  Max: 89  Resp  Min: 14  Max: 20    VITAL SIGNS: 24 HRS MIN & MAX.  Temp  Min: 97 °F (36.1 °C)  Max: 98.3 °F (36.8 °C)  BP  Min: 98/59  Max: 111/58  Pulse  Min: 52  Max: 72   Resp  Min: 14  Max: 20    VITAL SIGNS: 24 HRS MIN & MAX  Temp  Min: 97.4 °F (36.3 °C)  Max: 99.3 °F (37.4 °C)  BP  Min: 96/57  Max: 121/73  Pulse  Min: 50  Max: 66   Resp  Min: 16  Max: 16 Discharge summary       H&P 8/8             Hm note 8/9             Hm note 8/10             Hm note 8/11     Latest Reference Range & Units 08/08/23 11:56 08/09/23 06:01 08/09/23 08:18 08/10/23 04:00 08/11/23 05:44   WBC 4.50 - 11.50 x10(3)/mcL 2.28 (L) 1.56 (LL) 1.98 (LL) 2.56 (L) 2.62 (L)      Latest Reference Range & Units 08/08/23 14:01 08/10/23 04:00   CRP <5.00 mg/L 13.10 (H) 15.80 (H)      Latest Reference Range & Units 08/07/23 18:51   Lactate, Jason 0.5 - 2.2 mmol/L 1.9     remdesivir 200 mg in sodium chloride 0.9% 250 mL infusion  Dose: 200 mg  Freq: Every 24 hours (non-standard times) Route: IV  Start: 08/08/23 1330 End: 08/08/23 1410    remdesivir 100 mg in  sodium chloride 0.9% 100 mL infusion  Dose: 100 mg  Freq: Daily Route: IV  Start: 08/09/23 1300 End: 08/10/23 1036    doxycycline (VIBRAMYCIN) 100 mg in dextrose 5 % in water (D5W) 100 mL IVPB (MB+)  Dose: 100 mg  Freq: Every 12 hours (non-standard times) Route: IV  Indications of Use: Sepsis of Unknown Source  Start: 08/09/23 2300 End: 08/11/23 1552    piperacillin-tazobactam (ZOSYN) 4.5 g in dextrose 5 % in water (D5W) 100 mL IVPB (MB+)  Dose: 4.5 g  Freq: Every 8 hours (non-standard times) Route: IV  Indications of Use: Sepsis of Unknown Source  Start: 08/09/23 1720 End: 08/10/23 1305    vancomycin in dextrose 5 % 1 gram/250 mL IVPB 1,000 mg  Dose: 20 mg/kg  Weight Dosing Info: 45.4 kg (Dosing Weight)  Freq: Once Route: IV  Indications of Use: Sepsis of Unknown Source  Start: 08/09/23 1100 End: 08/09/23 1148 Labs                                           MAR           MAR         MAR               MAR                 MAR          Due to the conflicting clinical picture, please clinically validate the diagnosis of _Sepsis_.    If validated, please provide additional clinical support for the diagnosis.       [  x ] Sepsis is not confirmed and/or it has been ruled out   [   ] Sepsis  is not confirmed and/or it has been ruled out, other diagnosis ruled in (please          specify):_______________     [   ] Sepsis is confirmed. Please specify clinical support (signs, symptoms, and treatment) for  the confirmed diagnosis: ____________________     [   ] Other clarification (please specify): ___________________         Form No. [id number]

## 2023-08-18 ENCOUNTER — LAB REQUISITION (OUTPATIENT)
Dept: LAB | Facility: HOSPITAL | Age: 83
End: 2023-08-18
Payer: MEDICARE

## 2023-08-18 ENCOUNTER — PATIENT MESSAGE (OUTPATIENT)
Dept: ADMINISTRATIVE | Facility: OTHER | Age: 83
End: 2023-08-18
Payer: MEDICARE

## 2023-08-18 DIAGNOSIS — R53.1 WEAKNESS: ICD-10-CM

## 2023-08-18 DIAGNOSIS — G93.41 METABOLIC ENCEPHALOPATHY: ICD-10-CM

## 2023-08-18 DIAGNOSIS — A41.9 SEPSIS, UNSPECIFIED ORGANISM: ICD-10-CM

## 2023-08-18 LAB
ALBUMIN SERPL-MCNC: 3 G/DL (ref 3.4–4.8)
ALBUMIN/GLOB SERPL: 1 RATIO (ref 1.1–2)
ALP SERPL-CCNC: 63 UNIT/L (ref 40–150)
ALT SERPL-CCNC: 20 UNIT/L (ref 0–55)
AST SERPL-CCNC: 39 UNIT/L (ref 5–34)
BASOPHILS # BLD AUTO: 0.02 X10(3)/MCL
BASOPHILS NFR BLD AUTO: 0.9 %
BILIRUB SERPL-MCNC: 2 MG/DL
BUN SERPL-MCNC: 18.2 MG/DL (ref 9.8–20.1)
CALCIUM SERPL-MCNC: 8.3 MG/DL (ref 8.4–10.2)
CHLORIDE SERPL-SCNC: 115 MMOL/L (ref 98–107)
CO2 SERPL-SCNC: 21 MMOL/L (ref 23–31)
CREAT SERPL-MCNC: 0.74 MG/DL (ref 0.55–1.02)
EOSINOPHIL # BLD AUTO: 0.05 X10(3)/MCL (ref 0–0.9)
EOSINOPHIL NFR BLD AUTO: 2.2 %
ERYTHROCYTE [DISTWIDTH] IN BLOOD BY AUTOMATED COUNT: 16.5 % (ref 11.5–17)
GFR SERPLBLD CREATININE-BSD FMLA CKD-EPI: >60 MLS/MIN/1.73/M2
GLOBULIN SER-MCNC: 2.9 GM/DL (ref 2.4–3.5)
GLUCOSE SERPL-MCNC: 110 MG/DL (ref 82–115)
HCT VFR BLD AUTO: 36.3 % (ref 37–47)
HGB BLD-MCNC: 12.1 G/DL (ref 12–16)
IMM GRANULOCYTES # BLD AUTO: 0.02 X10(3)/MCL (ref 0–0.04)
IMM GRANULOCYTES NFR BLD AUTO: 0.9 %
LYMPHOCYTES # BLD AUTO: 0.81 X10(3)/MCL (ref 0.6–4.6)
LYMPHOCYTES NFR BLD AUTO: 35.4 %
MCH RBC QN AUTO: 32.5 PG (ref 27–31)
MCHC RBC AUTO-ENTMCNC: 33.3 G/DL (ref 33–36)
MCV RBC AUTO: 97.6 FL (ref 80–94)
MONOCYTES # BLD AUTO: 0.33 X10(3)/MCL (ref 0.1–1.3)
MONOCYTES NFR BLD AUTO: 14.4 %
NEUTROPHILS # BLD AUTO: 1.06 X10(3)/MCL (ref 2.1–9.2)
NEUTROPHILS NFR BLD AUTO: 46.2 %
NRBC BLD AUTO-RTO: 0 %
PLATELET # BLD AUTO: 67 X10(3)/MCL (ref 130–400)
PLATELETS.RETICULATED NFR BLD AUTO: 3.2 % (ref 0.9–11.2)
PMV BLD AUTO: 11.3 FL (ref 7.4–10.4)
POTASSIUM SERPL-SCNC: 4.2 MMOL/L (ref 3.5–5.1)
PROT SERPL-MCNC: 5.9 GM/DL (ref 5.8–7.6)
RBC # BLD AUTO: 3.72 X10(6)/MCL (ref 4.2–5.4)
SODIUM SERPL-SCNC: 144 MMOL/L (ref 136–145)
WBC # SPEC AUTO: 2.29 X10(3)/MCL (ref 4.5–11.5)

## 2023-08-18 PROCEDURE — 80053 COMPREHEN METABOLIC PANEL: CPT | Performed by: FAMILY MEDICINE

## 2023-08-18 PROCEDURE — 85025 COMPLETE CBC W/AUTO DIFF WBC: CPT | Performed by: FAMILY MEDICINE

## 2023-08-21 ENCOUNTER — TELEPHONE (OUTPATIENT)
Dept: FAMILY MEDICINE | Facility: CLINIC | Age: 83
End: 2023-08-21
Payer: MEDICARE

## 2023-08-21 ENCOUNTER — DOCUMENTATION ONLY (OUTPATIENT)
Dept: FAMILY MEDICINE | Facility: CLINIC | Age: 83
End: 2023-08-21
Payer: MEDICARE

## 2023-08-21 NOTE — TELEPHONE ENCOUNTER
----- Message from Ivania Ludwig sent at 8/21/2023  2:13 PM CDT -----  .Type:  Needs Medical Advice    Who Called: [facility]     Would the patient rather a call back or a response via [facility]? Call back   Best Call Back Number: [phone]  Additional Information: did you receive lab orders on the pt?

## 2023-08-21 NOTE — TELEPHONE ENCOUNTER
[facility] is requesting for you to please review and advise about pts last CBC and CMP from 08/18/2023. Results are in the pts chart

## 2023-08-23 ENCOUNTER — EXTERNAL HOME HEALTH (OUTPATIENT)
Dept: HOME HEALTH SERVICES | Facility: HOSPITAL | Age: 83
End: 2023-08-23
Payer: MEDICARE

## 2023-08-25 ENCOUNTER — DOCUMENT SCAN (OUTPATIENT)
Dept: HOME HEALTH SERVICES | Facility: HOSPITAL | Age: 83
End: 2023-08-25
Payer: MEDICARE

## 2023-08-28 ENCOUNTER — HOSPITAL ENCOUNTER (EMERGENCY)
Facility: HOSPITAL | Age: 83
Discharge: HOME | End: 2023-08-28
Attending: EMERGENCY MEDICINE
Payer: MEDICARE

## 2023-08-28 VITALS
RESPIRATION RATE: 14 BRPM | OXYGEN SATURATION: 99 % | BODY MASS INDEX: 19.13 KG/M2 | SYSTOLIC BLOOD PRESSURE: 123 MMHG | TEMPERATURE: 98 F | WEIGHT: 108 LBS | DIASTOLIC BLOOD PRESSURE: 65 MMHG | HEART RATE: 75 BPM
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
## 2023-08-28 DIAGNOSIS — T14.8XXA ABRASION: ICD-10-CM

## 2023-08-28 DIAGNOSIS — R07.81 RIB PAIN: ICD-10-CM

## 2023-08-28 LAB
ALBUMIN SERPL-MCNC: 3.1 G/DL (ref 3.4–4.8)
ALBUMIN/GLOB SERPL: 1 RATIO (ref 1.1–2)
ALP SERPL-CCNC: 77 UNIT/L (ref 40–150)
ALT SERPL-CCNC: 18 UNIT/L (ref 0–55)
APPEARANCE UR: CLEAR
AST SERPL-CCNC: 31 UNIT/L (ref 5–34)
BACTERIA #/AREA URNS AUTO: ABNORMAL /HPF
BASOPHILS # BLD AUTO: 0.01 X10(3)/MCL
BASOPHILS NFR BLD AUTO: 0.4 %
BILIRUB SERPL-MCNC: 2.2 MG/DL
BILIRUB UR QL STRIP.AUTO: NEGATIVE
BUN SERPL-MCNC: 18 MG/DL (ref 9.8–20.1)
CALCIUM SERPL-MCNC: 8.7 MG/DL (ref 8.4–10.2)
CHLORIDE SERPL-SCNC: 115 MMOL/L (ref 98–107)
CO2 SERPL-SCNC: 19 MMOL/L (ref 23–31)
COLOR UR: YELLOW
CREAT SERPL-MCNC: 0.78 MG/DL (ref 0.55–1.02)
EOSINOPHIL # BLD AUTO: 0.06 X10(3)/MCL (ref 0–0.9)
EOSINOPHIL NFR BLD AUTO: 2.3 %
ERYTHROCYTE [DISTWIDTH] IN BLOOD BY AUTOMATED COUNT: 16.9 % (ref 11.5–17)
GFR SERPLBLD CREATININE-BSD FMLA CKD-EPI: >60 MLS/MIN/1.73/M2
GLOBULIN SER-MCNC: 3 GM/DL (ref 2.4–3.5)
GLUCOSE SERPL-MCNC: 177 MG/DL (ref 82–115)
GLUCOSE UR QL STRIP.AUTO: NEGATIVE
HCT VFR BLD AUTO: 33.5 % (ref 37–47)
HGB BLD-MCNC: 11.2 G/DL (ref 12–16)
IMM GRANULOCYTES # BLD AUTO: 0 X10(3)/MCL (ref 0–0.04)
IMM GRANULOCYTES NFR BLD AUTO: 0 %
KETONES UR QL STRIP.AUTO: NEGATIVE
LEUKOCYTE ESTERASE UR QL STRIP.AUTO: ABNORMAL
LYMPHOCYTES # BLD AUTO: 0.59 X10(3)/MCL (ref 0.6–4.6)
LYMPHOCYTES NFR BLD AUTO: 23 %
MCH RBC QN AUTO: 32 PG (ref 27–31)
MCHC RBC AUTO-ENTMCNC: 33.4 G/DL (ref 33–36)
MCV RBC AUTO: 95.7 FL (ref 80–94)
MONOCYTES # BLD AUTO: 0.41 X10(3)/MCL (ref 0.1–1.3)
MONOCYTES NFR BLD AUTO: 16 %
NEUTROPHILS # BLD AUTO: 1.49 X10(3)/MCL (ref 2.1–9.2)
NEUTROPHILS NFR BLD AUTO: 58.3 %
NITRITE UR QL STRIP.AUTO: NEGATIVE
NRBC BLD AUTO-RTO: 0 %
PH UR STRIP.AUTO: 6 [PH]
PLATELET # BLD AUTO: 60 X10(3)/MCL (ref 130–400)
PMV BLD AUTO: 10.4 FL (ref 7.4–10.4)
POTASSIUM SERPL-SCNC: 4.1 MMOL/L (ref 3.5–5.1)
PROT SERPL-MCNC: 6.1 GM/DL (ref 5.8–7.6)
PROT UR QL STRIP.AUTO: NEGATIVE
RBC # BLD AUTO: 3.5 X10(6)/MCL (ref 4.2–5.4)
RBC #/AREA URNS AUTO: 10 /HPF
RBC UR QL AUTO: ABNORMAL
SODIUM SERPL-SCNC: 139 MMOL/L (ref 136–145)
SP GR UR STRIP.AUTO: 1.02 (ref 1–1.03)
SQUAMOUS #/AREA URNS AUTO: <5 /HPF
UROBILINOGEN UR STRIP-ACNC: 1
WBC # SPEC AUTO: 2.56 X10(3)/MCL (ref 4.5–11.5)
WBC #/AREA URNS AUTO: <5 /HPF

## 2023-08-28 PROCEDURE — 25500020 PHARM REV CODE 255

## 2023-08-28 PROCEDURE — 81001 URINALYSIS AUTO W/SCOPE: CPT | Performed by: PHYSICIAN ASSISTANT

## 2023-08-28 PROCEDURE — 96360 HYDRATION IV INFUSION INIT: CPT

## 2023-08-28 PROCEDURE — 99285 EMERGENCY DEPT VISIT HI MDM: CPT | Mod: 25

## 2023-08-28 PROCEDURE — 80053 COMPREHEN METABOLIC PANEL: CPT | Performed by: PHYSICIAN ASSISTANT

## 2023-08-28 PROCEDURE — 63600175 PHARM REV CODE 636 W HCPCS

## 2023-08-28 PROCEDURE — 85025 COMPLETE CBC W/AUTO DIFF WBC: CPT | Performed by: PHYSICIAN ASSISTANT

## 2023-08-28 RX ORDER — MUPIROCIN 20 MG/G
OINTMENT TOPICAL 3 TIMES DAILY
Qty: 15 G | Refills: 0 | Status: SHIPPED | OUTPATIENT
Start: 2023-08-28

## 2023-08-28 RX ADMIN — IOPAMIDOL 90 ML: 755 INJECTION, SOLUTION INTRAVENOUS at 06:08

## 2023-08-28 RX ADMIN — SODIUM CHLORIDE, POTASSIUM CHLORIDE, SODIUM LACTATE AND CALCIUM CHLORIDE 500 ML: 600; 310; 30; 20 INJECTION, SOLUTION INTRAVENOUS at 06:08

## 2023-08-28 NOTE — FIRST PROVIDER EVALUATION
Medical screening examination initiated.  I have conducted a focused provider triage encounter, findings are as follows:    Chief Complaint   Patient presents with    Fall     Fall last night, fell on back. Has gait instability. Family at bedside reports not hitting head. Patient has delayed speech. Denies any blood thinners. Patient has worsening mental status, waiting on appointment for neurologist.     Brief history of present illness:  83 y.o. female presents to the ED with fall onset last night, landing on her back. Had fall on Friday as well, hitting her mouth. Denies LOC.     Vitals:    08/28/23 1403   BP: 111/62   Pulse: 73   Resp: 20   Temp: 98.2 °F (36.8 °C)   TempSrc: Oral   SpO2: 99%   Weight: 49 kg (108 lb)       Pertinent physical exam:  Awake, alert, non-labored respirations, slowed speech (normal per ), abrasion to right mid-back, bruising to right side of mouth     Brief workup plan:  labs, imaging     Preliminary workup initiated; this workup will be continued and followed by the physician or advanced practice provider that is assigned to the patient when roomed.

## 2023-08-28 NOTE — ED PROVIDER NOTES
"Encounter Date: 8/28/2023       History     Chief Complaint   Patient presents with    Fall     Fall last night, fell on back. Has gait instability. Family at bedside reports not hitting head. Patient has delayed speech. Denies any blood thinners. Patient has worsening mental status, waiting on appointment for neurologist.     83 y.o. White female with a history of hypertension, liver cancer, and thyroid disease presents to  Emergency Department with a chief complaint of rib pain. Symptoms began on yesterday after a fall and have been constant since onset. Family reports they heard her fall and found her on her back. Associated symptoms include myalgias and abrasion. Patient has been having increased confusion and gait instability for several  months and is scheduled to see a neurologist in 4 months. Symptoms are aggravated with exertion and there are no alleviating factors. The patient denies CP, SOB, LOC, fever, abdominal pain, or dizziness. No other reported symptoms at this time      The history is provided by the patient. No  was used.   Fall  The accident occurred yesterday. The fall occurred while walking. She landed on A hard floor. There was no blood loss. The pain is present in the chest. She was Ambulatory at the scene. There was No entrapment after the fall. There was No drug use involved in the accident. There was No alcohol use involved in the accident. Pertinent negatives include no paresthesias, no paralysis, no fever, no numbness, no abdominal pain, no bowel incontinence, no nausea, no vomiting, no headaches, no hearing loss, no loss of consciousness and no tingling. The symptoms are aggravated by activity.     Review of patient's allergies indicates:   Allergen Reactions    Promethazine      Other reaction(s): hallucinates    Azo-sulfisoxazole      Other reaction(s): rash    Ciprofloxacin      Other reaction(s): hives    Linaclotide      Other reaction(s): "feels awful"     Past " Medical History:   Diagnosis Date    Hypercholesteremia     Hypertension     Liver cancer     Thyroid disease      Past Surgical History:   Procedure Laterality Date    ear lobe surgery      EYE SURGERY  2018     No family history on file.  Social History     Tobacco Use    Smoking status: Never     Passive exposure: Never    Smokeless tobacco: Never   Substance Use Topics    Alcohol use: Never    Drug use: Never     Review of Systems   Constitutional:  Negative for chills, fatigue and fever.   Eyes:  Negative for photophobia and visual disturbance.   Respiratory:  Negative for cough, shortness of breath, wheezing and stridor.    Cardiovascular:  Negative for palpitations and leg swelling.   Gastrointestinal:  Negative for abdominal pain, bowel incontinence, nausea and vomiting.   Musculoskeletal:  Positive for arthralgias.   Skin:  Positive for color change and wound.   Neurological:  Negative for dizziness, tingling, loss of consciousness, syncope, weakness, numbness, headaches and paresthesias.   All other systems reviewed and are negative.      Physical Exam     Initial Vitals [08/28/23 1403]   BP Pulse Resp Temp SpO2   111/62 73 20 98.2 °F (36.8 °C) 99 %      MAP       --         Physical Exam    Nursing note and vitals reviewed.  Constitutional: She appears well-developed and well-nourished. She is not diaphoretic. She is cooperative.  Non-toxic appearance. No distress.   HENT:   Head: Normocephalic and atraumatic.   Right Ear: External ear normal.   Left Ear: External ear normal.   Nose: Nose normal.   Mouth/Throat: Oropharynx is clear and moist.   Eyes: Conjunctivae and EOM are normal. Pupils are equal, round, and reactive to light.   Neck: Neck supple.   Normal range of motion.  Cardiovascular:  Normal rate, regular rhythm, S1 normal, S2 normal, normal heart sounds, intact distal pulses and normal pulses.           Pulmonary/Chest: Effort normal and breath sounds normal. No respiratory distress. She has no  wheezes. She exhibits tenderness.     Tenderness noted to outlined area.    Abdominal: Abdomen is soft. Bowel sounds are normal.   Musculoskeletal:         General: Normal range of motion.      Cervical back: Normal range of motion and neck supple.        Back:      Neurological: She is alert and oriented to person, place, and time. She has normal strength. No sensory deficit. GCS score is 15. GCS eye subscore is 4. GCS verbal subscore is 5. GCS motor subscore is 6.   Skin: Skin is warm and dry. Capillary refill takes less than 2 seconds. Abrasion and bruising noted. There is erythema.   Abrasion noted to L FA.    Bruising noted to upper lip.    Psychiatric: She has a normal mood and affect. Thought content normal.         ED Course   Procedures  Labs Reviewed   COMPREHENSIVE METABOLIC PANEL - Abnormal; Notable for the following components:       Result Value    Chloride 115 (*)     Carbon Dioxide 19 (*)     Glucose Level 177 (*)     Albumin Level 3.1 (*)     Albumin/Globulin Ratio 1.0 (*)     Bilirubin Total 2.2 (*)     All other components within normal limits   URINALYSIS, REFLEX TO URINE CULTURE - Abnormal; Notable for the following components:    Blood, UA 3+ (*)     Leukocyte Esterase, UA 1+ (*)     All other components within normal limits   CBC WITH DIFFERENTIAL - Abnormal; Notable for the following components:    WBC 2.56 (*)     RBC 3.50 (*)     Hgb 11.2 (*)     Hct 33.5 (*)     MCV 95.7 (*)     MCH 32.0 (*)     Platelet 60 (*)     Lymph # 0.59 (*)     Neut # 1.49 (*)     All other components within normal limits   URINALYSIS, MICROSCOPIC - Abnormal; Notable for the following components:    RBC, UA 10 (*)     All other components within normal limits   CBC W/ AUTO DIFFERENTIAL    Narrative:     The following orders were created for panel order CBC auto differential.  Procedure                               Abnormality         Status                     ---------                               -----------          ------                     CBC with Differential[[id number]]        Abnormal            Final result                 Please view results for these tests on the individual orders.          Imaging Results              CT Chest Abdomen Pelvis With Contrast (xpd) (Final result)  Result time 08/28/23 18:53:33      Final result by Ace Ratliff MD (08/28/23 18:53:33)                   Impression:      No acute traumatic injury identified.      Electronically signed by: Ace Ratliff  Date:    08/28/2023  Time:    18:53               Narrative:    EXAMINATION:  CT CHEST ABDOMEN PELVIS WITH CONTRAST (XPD)    CLINICAL HISTORY:  Polytrauma, blunt;    TECHNIQUE:  CT imaging of the chest, abdomen and pelvis after IV contrast. Axial, coronal and sagittal reformatted images reviewed. Dose length product is 180 mGycm. Automatic exposure control, adjustment of mA/kV or iterative reconstruction technique used to limit radiation dose.    COMPARISON:  CT abdomen/pelvis 08/09/2023    Chest CT 08/08/2023    FINDINGS:  No mediastinal hematoma or convincing findings for acute thoracic aortic injury.  No pleural effusion, pneumothorax or consolidation.  Bilateral breast implants.  Small caliber paraesophageal varices.    Cirrhotic liver.  No defined hepatic or splenic laceration.  Splenomegaly similar to prior exam.  Stable appearance of the pancreas, adrenal glands and kidneys.  No mesenteric hematoma or pneumoperitoneum.  Similar small volume ascites.  Normal bladder.    No acute osseous process appreciated.                                       CT Maxillofacial Without Contrast (Final result)  Result time 08/28/23 15:49:43      Final result by Bruno Jorgensen MD (08/28/23 15:49:43)                   Narrative:    EXAMINATION  CT MAXILLOFACIAL WITHOUT CONTRAST    CLINICAL HISTORY  Facial trauma, blunt;    TECHNIQUE  Non-contrast helical-acquisition CT images were obtained and multiplanar reconstructions accomplished by a CT  technologist at a separate workstation, pushed to PACS for physician review.    COMPARISON  None available at the time of initial interpretation.    FINDINGS  Images were reviewed in soft tissue and bone windows.    Exam quality: adequate for evaluation    Bones: No acutely displaced fracture is appreciated.  There is normal and symmetric TMJ alignment.    Orbits: Unremarkable appearance of the intraorbital fat and musculature. Normal, symmetric size and contour of the globes.    Aerodigestive Structures: Clear sinuses, with no mucosal thickening.  Unremarkable appearance of the naso-/oropharynx.    Other findings: No evidence of acute or focal intracranial abnormality or cervical spine displacement.  Bilateral mastoids are well aerated. Regional vasculature is unremarkable.  Visualized subcutaneous tissues are without acute or focal abnormality.    IMPRESSION  No convincing displaced fracture or other acute maxillofacial process.    RADIATION DOSE  Automated tube current modulation, weight-based exposure dosing, and/or iterative reconstruction technique utilized to reach lowest reasonably achievable exposure rate.    DLP: 412 mGy*cm      Electronically signed by: Bruno Jorgensen  Date:    08/28/2023  Time:    15:49                                     CT Cervical Spine Without Contrast (Final result)  Result time 08/28/23 15:48:10      Final result by Desiree Zepeda MD (08/28/23 15:48:10)                   Impression:      No acute fracture identified.      Electronically signed by: Desiree Zepeda  Date:    08/28/2023  Time:    15:48               Narrative:    EXAMINATION:  CT CERVICAL SPINE WITHOUT CONTRAST    CLINICAL HISTORY:  Neck trauma (Age >= 65y);    TECHNIQUE:  Noncontrast CT images of the cervical 267 spine. Axial, coronal, and sagittal reformatted images were obtained. Dose length product is 267 mGycm. Automatic exposure control, adjustment of mA/kV or iterative reconstruction technique was used to  limit radiation dose.    COMPARISON:  None    FINDINGS:  The cervical spine is visualized through the level of T1    There is no acute fracture identified.  There is grade 1 anterolisthesis of C7 over T1.  There are multilevel degenerative changes with disc height loss, marginal osteophyte formation and facet arthropathy.  There is no paraspinal hematoma.                                       CT Head Without Contrast (Final result)  Result time 08/28/23 15:43:46      Final result by Desiree Zepeda MD (08/28/23 15:43:46)                   Impression:      1. No acute intracranial abnormality.  2. Chronic microvascular ischemic changes.      Electronically signed by: Desiree Zepeda  Date:    08/28/2023  Time:    15:43               Narrative:    EXAMINATION:  CT HEAD WITHOUT CONTRAST    CLINICAL HISTORY:  Facial trauma, blunt;    TECHNIQUE:  Axial scans were obtained from skull base to the vertex.    Coronal and sagittal reconstructions obtained from the axial data.    Automatic exposure control was utilized to limit radiation dose.    Contrast: None    Radiation Dose:    Total DLP: 909 mGy*cm    COMPARISON:  None    FINDINGS:  There is no acute intracranial hemorrhage or edema. The gray-white matter differentiation is preserved.  Patchy hypodensities in the subcortical and periventricular white matter likely represent chronic microvascular ischemic changes.    There is no mass effect or midline shift. The ventricles and sulci are normal in size. The basal cisterns are patent. There is no abnormal extra-axial fluid collection.    The calvarium and skull base are intact. The visualized paranasal sinuses and the mastoid air cells are clear.                                       XR Ribs Min 3 Views w/PA Chest Right (Final result)  Result time 08/28/23 14:55:21      Final result by Bruno Jorgensen MD (08/28/23 14:55:21)                   Narrative:    EXAMINATION  XR RIBS MIN 3 VIEWS W/ PA CHEST RIGHT    CLINICAL  HISTORY  fall;    TECHNIQUE  A total of 3 images of the ribs, submitted with PA view of the chest.    COMPARISON  8 August 2023    FINDINGS  Lines/tubes/devices: none present    The cardiomediastinal silhouette and central vascular structures are without significant interval change.  The trachea is midline. There is no lobar consolidation, pleural effusion, or pneumothorax.    No convincing acute osseous displacement is identified.  Degenerative alterations through the spinal column are similar in comparison.  The extra thoracic and upper abdominal soft tissues are without significant interval change.    IMPRESSION  1. No acute cardiopulmonary abnormality.  2. No displaced rib fracture.      Electronically signed by: Bruno Jorgensen  Date:    08/28/2023  Time:    14:55                                     Medications   lactated ringers bolus 500 mL (0 mLs Intravenous Stopped 8/28/23 1911)   iopamidoL (ISOVUE-370) injection 100 mL (90 mLs Intravenous Given 8/28/23 1834)     Medical Decision Making  Amount and/or Complexity of Data Reviewed  Radiology: ordered.    Risk  Prescription drug management.                          Medical Decision Making:   History:   I obtained history from: someone other than patient.       <> Summary of History: Patient's  at bedside providing history and reason for visit.   Initial Assessment:   Patient awake, alert, answers questions appropriately, and follows commands appropriately. C/o R rib pain that began after a fall on yesterday. Fall was unwitnessed; family found her on her back. Patient has been experiencing gait instability and intermittent confusion for several months. Has appointment with neurology in November 2023. GCS 15 on exam. NAD Noted.   Differential Diagnosis:   Rib Fracture, Abrasion, Fall, Rib Contusion  Clinical Tests:   Lab Tests: Ordered and Reviewed  Radiological Study: Ordered and Reviewed  ED Management:  Co-morbidities and/or factors adding to the  complexity or risk for the patient?: none  Differential diagnoses: Rib Fracture, Abrasion, Fall, Rib Contusion  Decision to obtain previous or outside records?: I reviewed previous labs.   Chart Review (nursing home, outside records, CareEverywhere): yes  Review of RX medications/new RX prescribed by me?: Prescribed Bactroban   Labs/imaging/other tests obtained/considered (risk/benefits of testing discussed): cbc, cmp, ua, imaging  Labs/tests intepretation: Leukopenia noted but patient has hx of this. Platelets 60 but trending higher than baseline. UA- RBCs noted but after reviewing previous labs patient has hx of this. CO2- 19. CT head- 1. No acute intracranial abnormality. 2. Chronic microvascular ischemic changes. CT cervical spine-  No acute fracture identified. CT max/face- No convincing displaced fracture or other acute maxillofacial process. CT chest/abdomen/pelvis- No acute traumatic injury identified. Rib Xray- 1. No acute cardiopulmonary abnormality. 2. No displaced rib fracture. Informed patient and patient's  of results.   My independent imaging interpretation: none  Treatment/interventions, IV fluids, IV medications: IV fluids given in ER.   Complex management (IV controlled substances, went to OR, DNR, meds requiring monitoring, transfer, etc)?: none  Workup/treatment affected by social determinants of health?:none   Point of care US done/interpretation: none  Consults/radiologist/EMS/social work/family discussion/alternate history: none  Advanced care planning/end of life discussion: none  Shared decision making: Discussed plan of care and interventions with patient and patient's family. Agreed to and aware of plan of care. Comfortable being discharged home.   ETOH/smoking/drug cessation discussion: none  Dispo: Patient discharged home. Patient denies new or additional complaints; no further tests indicated at this time. Verbalized understanding of instructions. No emergent or apparent distress  noted prior to discharge. To follow up with PCP in 1 week as needed. Strict ER return precautions given.           Clinical Impression:   Final diagnoses:  [W19.XXXA] Fall, initial encounter (Primary)  [R07.81] Rib pain  [T14.8XXA] Abrasion        ED Disposition Condition    Discharge Stable          ED Prescriptions       Medication Sig Dispense Start Date End Date Auth. Provider    mupirocin (BACTROBAN) 2 % ointment Apply topically 3 (three) times daily. 15 g 8/28/2023 -- Flor Lan, NP          Follow-up Information       Follow up With Specialties Details Why Contact Info    Angélica Dominguez MD Family Medicine Call in 1 week If symptoms worsen, As needed [id number] [facility]  [address]  [facility] [address]  [phone]      [facility] - Emergency Dept Emergency Medicine Go to  If symptoms worsen, As needed [address] [facility] [place] [id number] [phone]             Flor Lan, NP  08/28/23 1940

## 2023-08-29 NOTE — DISCHARGE INSTRUCTIONS
Thanks for letting us take care of you today!  It is our goal to give you courteous care and to keep you comfortable and informed, if you have any questions before you leave I will be happy to try and answer them.    Here is some advice after your visit:      Your visit in the emergency department is NOT definitive care - please follow-up with your primary care doctor and/or specialist within 1 week.  Please return if you have any worsening in your condition or if you have any other concerns.    If you had radiology exams like an XRAY or CT in the emergency Department the interpreation on them may be preliminary - there may be less time sensitive findings on the reports please obtain these reports within 24 hours from the hospital or by using your out on your mobile phone to access records.  Bring these to your primary care doctor and/or specialist for further review of incidental findings.    Please review any LAB WORK from your visit today with your primary care physician..

## 2023-09-05 ENCOUNTER — OFFICE VISIT (OUTPATIENT)
Dept: FAMILY MEDICINE | Facility: CLINIC | Age: 83
End: 2023-09-05
Payer: MEDICARE

## 2023-09-05 VITALS
OXYGEN SATURATION: 99 % | SYSTOLIC BLOOD PRESSURE: 106 MMHG | DIASTOLIC BLOOD PRESSURE: 68 MMHG | HEIGHT: 64 IN | HEART RATE: 72 BPM | WEIGHT: 105.63 LBS | BODY MASS INDEX: 18.03 KG/M2

## 2023-09-05 DIAGNOSIS — K64.9 HEMORRHOIDS, UNSPECIFIED HEMORRHOID TYPE: ICD-10-CM

## 2023-09-05 DIAGNOSIS — R29.6 FALLS FREQUENTLY: ICD-10-CM

## 2023-09-05 DIAGNOSIS — C22.0 HEPATOCELLULAR CARCINOMA: Primary | ICD-10-CM

## 2023-09-05 DIAGNOSIS — R29.90 ALTERATION IN NEUROLOGICAL STATUS IN ADULT: ICD-10-CM

## 2023-09-05 PROCEDURE — 99214 PR OFFICE/OUTPT VISIT, EST, LEVL IV, 30-39 MIN: ICD-10-PCS | Mod: ,,, | Performed by: FAMILY MEDICINE

## 2023-09-05 PROCEDURE — 99214 OFFICE O/P EST MOD 30 MIN: CPT | Mod: ,,, | Performed by: FAMILY MEDICINE

## 2023-09-05 RX ORDER — HYDROCORTISONE 25 MG/G
CREAM TOPICAL 2 TIMES DAILY PRN
Qty: 28 G | Refills: 3
Start: 2023-09-05 | End: 2023-09-05 | Stop reason: SDUPTHER

## 2023-09-05 NOTE — PROGRESS NOTES
"   Patient ID: [id number]     Chief Complaint: Follow-up (Patient is following up from hospital where she had two incidents first for Covid and then from a fall.  )        HPI:     Tessa Dozier is a 83 y.o. female here today with her  for follow up s/p [facility] ER visit on 08/28/2023 due to a fall. She has a history of hepatocellular carcinoma, HTN, HLD, and hypothyroidism. She denies pain following the fall and had negative imaging of her head, maxillofacial, C-spine, and chest/abdomen/pelvis as well as stable labs. Patient's  reports that patient has continued to have increased confusion and gait instability for several months and she had negative workup with Hematology (Dr. Hagan) and has been referred to Neurology (Dr. Mulligan), but cannot get an appointment until 11/06/2023. Symptoms are aggravated with exertion and there are no alleviating factors. The patient denies CP, SOB, LOC, fever, abdominal pain, or dizziness. She is ambulating with a rollator. Her  would like a referral to a Neurologist who can see patient sooner and her would like consultation with Palliative Care/Hospice for additional assistance with patient's medical needs.  - External hemorrhoids are controlled with topical Anusol, no side effects, she needs Rx refill today.   - Patient and her  are without any other complaints today.     ----------------------------  Hypercholesteremia  Hypertension  Liver cancer  Thyroid disease     Past Surgical History:   Procedure Laterality Date    ear lobe surgery      EYE SURGERY  2018       Review of patient's allergies indicates:   Allergen Reactions    Promethazine      Other reaction(s): hallucinates    Azo-sulfisoxazole      Other reaction(s): rash    Ciprofloxacin      Other reaction(s): hives    Linaclotide      Other reaction(s): "feels awful"       Outpatient Medications Marked as Taking for the 9/5/23 encounter (Office Visit) with Angélica Dominguez MD   Medication " Sig Dispense Refill    ALPRAZolam (XANAX) 2 MG Tab Take 1 tablet (2 mg total) by mouth every 24 hours as needed (anxiety). 30 tablet 2    carvediloL (COREG) 3.125 MG tablet TAKE 1 TABLET BY MOUTH TWICE A  tablet 3    levothyroxine (SYNTHROID) 75 MCG tablet Take 1 tablet (75 mcg total) by mouth before breakfast. 90 tablet 3    mupirocin (BACTROBAN) 2 % ointment Apply topically 3 (three) times daily. 15 g 0    pravastatin (PRAVACHOL) 40 MG tablet Take 40 mg by mouth once daily.      [DISCONTINUED] hydrocortisone (ANUSOL-HC) 2.5 % rectal cream Place rectally 2 (two) times daily.         Social History     Socioeconomic History    Marital status:    Tobacco Use    Smoking status: Never     Passive exposure: Never    Smokeless tobacco: Never   Substance and Sexual Activity    Alcohol use: Never    Drug use: Never    Sexual activity: Not Currently     Social Determinants of Health     Financial Resource Strain: Low Risk  (8/9/2023)    Overall Financial Resource Strain (CARDIA)     Difficulty of Paying Living Expenses: Not hard at all   Food Insecurity: No Food Insecurity (8/9/2023)    Hunger Vital Sign     Worried About Running Out of Food in the Last Year: Never true     Ran Out of Food in the Last Year: Never true   Transportation Needs: No Transportation Needs (8/9/2023)    PRAPARE - Transportation     Lack of Transportation (Medical): No     Lack of Transportation (Non-Medical): No   Physical Activity: Unknown (8/9/2023)    Exercise Vital Sign     Days of Exercise per Week: 5 days   Stress: No Stress Concern Present (8/9/2023)    [employer] - Occupational Stress Questionnaire     Feeling of Stress : Not at all   Social Connections: Unknown (8/9/2023)    Social Connection and Isolation Panel [NHANES]     Frequency of Communication with Friends and Family: Once a week     Frequency of Social Gatherings with Friends and Family: Once a week     Active Member of Clubs or  "Organizations: Yes     Attends Club or Organization Meetings: Patient refused     Marital Status:    Housing Stability: Low Risk  (8/9/2023)    Housing Stability Vital Sign     Unable to Pay for Housing in the Last Year: No     Number of Places Lived in the Last Year: 1     Unstable Housing in the Last Year: No        History reviewed. No pertinent family history.     Subjective:       Review of Systems:    See HPI for details    Constitutional: As per HPI. Denies Change in appetite. Denies Chills. Denies Fever. Denies Night sweats.  Eye: Denies Blurred vision. Denies Discharge. Denies Eye pain.  ENT: Denies Decreased hearing. Denies Sore throat. Denies Swollen glands.  Respiratory: Denies Cough. Denies Shortness of breath. Denies Shortness of breath with exertion. Denies Wheezing.  Cardiovascular: Denies Chest pain at rest. Denies Chest pain with exertion. Denies Irregular heartbeat. Denies Palpitations.  Gastrointestinal: As per HPI. Denies Abdominal pain. Denies Diarrhea. Denies Nausea. Denies Vomiting. Denies Hematemesis or Hematochezia.  Genitourinary: Denies Dysuria. Denies Urinary frequency. Denies Urinary urgency. Denies Blood in urine.  Endocrine: Denies Cold intolerance. Denies Excessive thirst. Denies Heat intolerance. Denies Weight loss. Denies Weight gain.  Musculoskeletal: Denies Painful joints. Denies Weakness.  Integumentary: Denies Rash. Denies Itching. Denies Dry skin.  Neurologic: Denies Dizziness. Denies Fainting. Denies Headache.  Psychiatric: Denies Depression. Denies Anxiety. Denies Suicidal/Homicidal ideations.    All Other ROS: Negative except as stated in HPI.       Objective:     /68 (BP Location: Right arm, Patient Position: Sitting, BP Method: Medium (Automatic))   Pulse 72   Ht 5' 4" (1.626 m)   Wt 47.9 kg (105 lb 9.6 oz)   SpO2 99%   BMI 18.13 kg/m²     Physical Exam    General: Alert and oriented, No acute distress. Thin, frail.   Head: Normocephalic, " Atraumatic.  Eye: Pupils are equal, round and reactive to light, Extraocular movements are intact, Sclera non-icteric.  Ears/Nose/Throat: Normal, Mucosa moist,Clear.  Neck/Thyroid: Supple, Non-tender, No carotid bruit, No palpable thyromegaly or thyroid nodule, No lymphadenopathy, No JVD, Full range of motion.  Respiratory: Clear to auscultation bilaterally; No wheezes, rales or rhonchi,Non-labored respirations, Symmetrical chest wall expansion.  Cardiovascular: Regular rate and rhythm, S1/S2 normal, No murmurs, rubs or gallops.  Gastrointestinal: Soft, Non-tender, Non-distended, Normal bowel sounds, No palpable organomegaly.  Musculoskeletal: Normal range of motion. Uses rollator to assist with ambulation.   Integumentary: Warm, Dry, Intact, No suspicious lesions or rashes.  Extremities: No clubbing, cyanosis or edema  Neurologic: No focal deficits, Cranial Nerves II-XII are grossly intact, Motor strength normal upper and lower extremities, Sensory exam intact. Slow speech  Psychiatric: Normal interaction, Coherent speech, Euthymic mood, Appropriate affect     *Records from [facility] visit on 08/28/2023 were reviewed and discussed with patient and patient voices understanding.*    Assessment:       ICD-10-CM ICD-9-CM   1. Hepatocellular carcinoma  C22.0 155.0   2. Alteration in neurological status in adult  R29.90 781.99   3. Falls frequently  R29.6 V15.88   4. Hemorrhoids, unspecified hemorrhoid type  K64.9 455.6        Plan:     Problem List Items Addressed This Visit    None  Visit Diagnoses       Hepatocellular carcinoma    -  Primary    Relevant Orders    Ambulatory referral/consult to Hospice    Ambulatory referral/consult to Neurology    Alteration in neurological status in adult        Relevant Orders    Ambulatory referral/consult to Hospice    Ambulatory referral/consult to Neurology    Falls frequently        Relevant Orders    Ambulatory referral/consult to Hospice    Ambulatory referral/consult to  Neurology    Hemorrhoids, unspecified hemorrhoid type        Relevant Medications    hydrocortisone (ANUSOL-HC) 2.5 % rectal cream         1. Hepatocellular carcinoma  - Ambulatory referral/consult to Hospice; Future for evaluation and treatment  - Ambulatory referral/consult to Neurology; Future for evaluation and treatment  - Continue current treatment plan for now. Notify M.D. or ER if symptoms persist or worsen, temp >100.4, or any acute illness.      2. Alteration in neurological status in adult  - Ambulatory referral/consult to Hospice; Future  - Ambulatory referral/consult to Neurology; Future  - Fall precautions encouraged. Same as #1.     3. Falls frequently  - Ambulatory referral/consult to Hospice; Future  - Ambulatory referral/consult to Neurology; Future  - Same as #1.     4. Hemorrhoids, unspecified hemorrhoid type  - Rx hydrocortisone (ANUSOL-HC) 2.5 % rectal cream; Place rectally 2 (two) times daily as needed for Hemorrhoids.  Dispense: 28 g; Refill: 3 refilled today. Notify M.D. or ER if symptoms persist or worsen, temp >100.4, or any acute illness.          Tessa was seen today for follow-up.    Diagnoses and all orders for this visit:    Hepatocellular carcinoma  -     Ambulatory referral/consult to Hospice; Future  -     Ambulatory referral/consult to Neurology; Future    Alteration in neurological status in adult  -     Ambulatory referral/consult to Hospice; Future  -     Ambulatory referral/consult to Neurology; Future    Falls frequently  -     Ambulatory referral/consult to Hospice; Future  -     Ambulatory referral/consult to Neurology; Future    Hemorrhoids, unspecified hemorrhoid type  -     hydrocortisone (ANUSOL-HC) 2.5 % rectal cream; Place rectally 2 (two) times daily as needed for Hemorrhoids.          Medication List with Changes/Refills   Current Medications    ALPRAZOLAM (XANAX) 2 MG TAB    Take 1 tablet (2 mg total) by mouth every 24 hours as needed (anxiety).       Start Date:  8/15/2023 End Date: --    CARVEDILOL (COREG) 3.125 MG TABLET    TAKE 1 TABLET BY MOUTH TWICE A DAY       Start Date: 1/9/2023  End Date: --    LEVOTHYROXINE (SYNTHROID) 75 MCG TABLET    Take 1 tablet (75 mcg total) by mouth before breakfast.       Start Date: 7/5/2023  End Date: --    MUPIROCIN (BACTROBAN) 2 % OINTMENT    Apply topically 3 (three) times daily.       Start Date: 8/28/2023 End Date: --    PRAVASTATIN (PRAVACHOL) 40 MG TABLET    Take 40 mg by mouth once daily.       Start Date: 4/8/2023  End Date: --   Changed and/or Refilled Medications    Modified Medication Previous Medication    HYDROCORTISONE (ANUSOL-HC) 2.5 % RECTAL CREAM hydrocortisone (ANUSOL-HC) 2.5 % rectal cream       Place rectally 2 (two) times daily as needed for Hemorrhoids.    Place rectally 2 (two) times daily.       Start Date: 9/5/2023  End Date: --    Start Date: 8/14/2023 End Date: 9/5/2023   Discontinued Medications    DOCUSATE SODIUM (COLACE) 100 MG CAPSULE    Take 1 capsule (100 mg total) by mouth 2 (two) times daily.       Start Date: 8/14/2023 End Date: 9/5/2023    POLYETHYLENE GLYCOL (GLYCOLAX) 17 GRAM PWPK    Take 17 g by mouth once daily.       Start Date: 8/14/2023 End Date: 9/5/2023          Follow up in about 3 months (around 12/5/2023) for Anxiety followup- 30 minute appointment.

## 2023-09-05 NOTE — PATIENT INSTRUCTIONS
Nguyễn Zarate,     If you are due for any health screening(s) below please notify me so we can arrange them to be ordered and scheduled. Most healthy patients at your age complete them, but you are free to accept or refuse.     If you can't do it, I'll definitely understand. If you can, I'd certainly appreciate it!    All of your core healthy metrics are met.

## 2023-09-06 RX ORDER — HYDROCORTISONE 25 MG/G
CREAM TOPICAL 2 TIMES DAILY PRN
Qty: 28 G | Refills: 3
Start: 2023-09-06 | End: 2023-09-20 | Stop reason: SDUPTHER

## 2023-09-12 ENCOUNTER — HOSPITAL ENCOUNTER (INPATIENT)
Facility: HOSPITAL | Age: 83
LOS: 3 days | Discharge: HOSPICE | DRG: 441 | End: 2023-09-15
Attending: EMERGENCY MEDICINE | Admitting: STUDENT IN AN ORGANIZED HEALTH CARE EDUCATION/TRAINING PROGRAM
Payer: MEDICARE

## 2023-09-12 DIAGNOSIS — R41.82 ALTERED MENTAL STATUS: ICD-10-CM

## 2023-09-12 DIAGNOSIS — K76.82 HEPATIC ENCEPHALOPATHY: Primary | ICD-10-CM

## 2023-09-12 DIAGNOSIS — Z46.59 ENCOUNTER FOR NASOGASTRIC TUBE PLACEMENT: ICD-10-CM

## 2023-09-12 LAB
ALBUMIN SERPL-MCNC: 3.3 G/DL (ref 3.4–4.8)
ALBUMIN/GLOB SERPL: 0.9 RATIO (ref 1.1–2)
ALP SERPL-CCNC: 93 UNIT/L (ref 40–150)
ALT SERPL-CCNC: 18 UNIT/L (ref 0–55)
AMMONIA PLAS-MSCNC: 136.4 UMOL/L (ref 18–72)
AMPHET UR QL SCN: NEGATIVE
APPEARANCE UR: CLEAR
APTT PPP: 31.4 SECONDS (ref 23.2–33.7)
AST SERPL-CCNC: 32 UNIT/L (ref 5–34)
BACTERIA #/AREA URNS AUTO: NORMAL /HPF
BARBITURATE SCN PRESENT UR: NEGATIVE
BASOPHILS # BLD AUTO: 0.05 X10(3)/MCL
BASOPHILS NFR BLD AUTO: 0.9 %
BENZODIAZ UR QL SCN: POSITIVE
BILIRUB SERPL-MCNC: 3.1 MG/DL
BILIRUB UR QL STRIP.AUTO: NEGATIVE
BUN SERPL-MCNC: 33.6 MG/DL (ref 9.8–20.1)
CALCIUM SERPL-MCNC: 9 MG/DL (ref 8.4–10.2)
CANNABINOIDS UR QL SCN: NEGATIVE
CHLORIDE SERPL-SCNC: 118 MMOL/L (ref 98–107)
CO2 SERPL-SCNC: 20 MMOL/L (ref 23–31)
COCAINE UR QL SCN: NEGATIVE
COLOR UR: ABNORMAL
CREAT SERPL-MCNC: 0.77 MG/DL (ref 0.55–1.02)
EOSINOPHIL # BLD AUTO: 0.1 X10(3)/MCL (ref 0–0.9)
EOSINOPHIL NFR BLD AUTO: 1.9 %
ERYTHROCYTE [DISTWIDTH] IN BLOOD BY AUTOMATED COUNT: 17.2 % (ref 11.5–17)
ETHANOL SERPL-MCNC: <10 MG/DL
FENTANYL UR QL SCN: NEGATIVE
FLUAV AG UPPER RESP QL IA.RAPID: NOT DETECTED
FLUBV AG UPPER RESP QL IA.RAPID: NOT DETECTED
GFR SERPLBLD CREATININE-BSD FMLA CKD-EPI: >60 MLS/MIN/1.73/M2
GLOBULIN SER-MCNC: 3.5 GM/DL (ref 2.4–3.5)
GLUCOSE SERPL-MCNC: 138 MG/DL (ref 82–115)
GLUCOSE UR QL STRIP.AUTO: NEGATIVE
HCT VFR BLD AUTO: 40.9 % (ref 37–47)
HGB BLD-MCNC: 14 G/DL (ref 12–16)
IMM GRANULOCYTES # BLD AUTO: 0.02 X10(3)/MCL (ref 0–0.04)
IMM GRANULOCYTES NFR BLD AUTO: 0.4 %
INR PPP: 1.4
KETONES UR QL STRIP.AUTO: NEGATIVE
LACTATE SERPL-SCNC: 1.7 MMOL/L (ref 0.5–2.2)
LEUKOCYTE ESTERASE UR QL STRIP.AUTO: ABNORMAL
LYMPHOCYTES # BLD AUTO: 1.11 X10(3)/MCL (ref 0.6–4.6)
LYMPHOCYTES NFR BLD AUTO: 20.9 %
MAGNESIUM SERPL-MCNC: 2.2 MG/DL (ref 1.6–2.6)
MCH RBC QN AUTO: 33.2 PG (ref 27–31)
MCHC RBC AUTO-ENTMCNC: 34.2 G/DL (ref 33–36)
MCV RBC AUTO: 96.9 FL (ref 80–94)
MDMA UR QL SCN: NEGATIVE
MONOCYTES # BLD AUTO: 0.71 X10(3)/MCL (ref 0.1–1.3)
MONOCYTES NFR BLD AUTO: 13.4 %
NEUTROPHILS # BLD AUTO: 3.32 X10(3)/MCL (ref 2.1–9.2)
NEUTROPHILS NFR BLD AUTO: 62.5 %
NITRITE UR QL STRIP.AUTO: NEGATIVE
NRBC BLD AUTO-RTO: 0 %
OPIATES UR QL SCN: NEGATIVE
PCP UR QL: NEGATIVE
PH UR STRIP.AUTO: 7 [PH]
PH UR: 7 [PH] (ref 3–11)
PLATELET # BLD AUTO: 88 X10(3)/MCL (ref 130–400)
PMV BLD AUTO: 10.5 FL (ref 7.4–10.4)
POTASSIUM SERPL-SCNC: 4.4 MMOL/L (ref 3.5–5.1)
PROT SERPL-MCNC: 6.8 GM/DL (ref 5.8–7.6)
PROT UR QL STRIP.AUTO: NEGATIVE
PROTHROMBIN TIME: 17.2 SECONDS (ref 12.5–14.5)
RBC # BLD AUTO: 4.22 X10(6)/MCL (ref 4.2–5.4)
RBC #/AREA URNS AUTO: NORMAL /HPF
RBC UR QL AUTO: ABNORMAL
SARS-COV-2 RNA RESP QL NAA+PROBE: DETECTED
SODIUM SERPL-SCNC: 145 MMOL/L (ref 136–145)
SP GR UR STRIP.AUTO: 1.02 (ref 1–1.03)
SQUAMOUS #/AREA URNS AUTO: NORMAL /HPF
TROPONIN I SERPL-MCNC: <0.01 NG/ML (ref 0–0.04)
TSH SERPL-ACNC: 0.2 UIU/ML (ref 0.35–4.94)
UROBILINOGEN UR STRIP-ACNC: 1
WBC # SPEC AUTO: 5.31 X10(3)/MCL (ref 4.5–11.5)
WBC #/AREA URNS AUTO: NORMAL /HPF

## 2023-09-12 PROCEDURE — 85025 COMPLETE CBC W/AUTO DIFF WBC: CPT | Performed by: EMERGENCY MEDICINE

## 2023-09-12 PROCEDURE — 93010 ELECTROCARDIOGRAM REPORT: CPT | Mod: ,,, | Performed by: INTERNAL MEDICINE

## 2023-09-12 PROCEDURE — 82140 ASSAY OF AMMONIA: CPT | Performed by: EMERGENCY MEDICINE

## 2023-09-12 PROCEDURE — 84484 ASSAY OF TROPONIN QUANT: CPT | Performed by: EMERGENCY MEDICINE

## 2023-09-12 PROCEDURE — 11000001 HC ACUTE MED/SURG PRIVATE ROOM

## 2023-09-12 PROCEDURE — 25000003 PHARM REV CODE 250: Performed by: EMERGENCY MEDICINE

## 2023-09-12 PROCEDURE — 83735 ASSAY OF MAGNESIUM: CPT | Performed by: EMERGENCY MEDICINE

## 2023-09-12 PROCEDURE — 83605 ASSAY OF LACTIC ACID: CPT | Performed by: EMERGENCY MEDICINE

## 2023-09-12 PROCEDURE — 0240U COVID/FLU A&B PCR: CPT | Performed by: EMERGENCY MEDICINE

## 2023-09-12 PROCEDURE — 84443 ASSAY THYROID STIM HORMONE: CPT | Performed by: EMERGENCY MEDICINE

## 2023-09-12 PROCEDURE — 25000003 PHARM REV CODE 250: Performed by: STUDENT IN AN ORGANIZED HEALTH CARE EDUCATION/TRAINING PROGRAM

## 2023-09-12 PROCEDURE — 93010 EKG 12-LEAD: ICD-10-PCS | Mod: ,,, | Performed by: INTERNAL MEDICINE

## 2023-09-12 PROCEDURE — 85610 PROTHROMBIN TIME: CPT | Performed by: EMERGENCY MEDICINE

## 2023-09-12 PROCEDURE — 81001 URINALYSIS AUTO W/SCOPE: CPT | Performed by: EMERGENCY MEDICINE

## 2023-09-12 PROCEDURE — 99285 EMERGENCY DEPT VISIT HI MDM: CPT | Mod: 25

## 2023-09-12 PROCEDURE — 85730 THROMBOPLASTIN TIME PARTIAL: CPT | Performed by: EMERGENCY MEDICINE

## 2023-09-12 PROCEDURE — 63600175 PHARM REV CODE 636 W HCPCS: Performed by: EMERGENCY MEDICINE

## 2023-09-12 PROCEDURE — 80307 DRUG TEST PRSMV CHEM ANLYZR: CPT | Performed by: EMERGENCY MEDICINE

## 2023-09-12 PROCEDURE — 93005 ELECTROCARDIOGRAM TRACING: CPT

## 2023-09-12 PROCEDURE — 82077 ASSAY SPEC XCP UR&BREATH IA: CPT | Performed by: EMERGENCY MEDICINE

## 2023-09-12 PROCEDURE — 80053 COMPREHEN METABOLIC PANEL: CPT | Performed by: EMERGENCY MEDICINE

## 2023-09-12 RX ORDER — IBUPROFEN 200 MG
24 TABLET ORAL
Status: DISCONTINUED | OUTPATIENT
Start: 2023-09-12 | End: 2023-09-15 | Stop reason: HOSPADM

## 2023-09-12 RX ORDER — LACTULOSE 10 G/15ML
30 SOLUTION ORAL 3 TIMES DAILY
Status: DISCONTINUED | OUTPATIENT
Start: 2023-09-12 | End: 2023-09-15

## 2023-09-12 RX ORDER — ENOXAPARIN SODIUM 100 MG/ML
30 INJECTION SUBCUTANEOUS EVERY 24 HOURS
Status: DISCONTINUED | OUTPATIENT
Start: 2023-09-12 | End: 2023-09-15 | Stop reason: HOSPADM

## 2023-09-12 RX ORDER — SODIUM CHLORIDE 0.9 % (FLUSH) 0.9 %
10 SYRINGE (ML) INJECTION EVERY 12 HOURS PRN
Status: DISCONTINUED | OUTPATIENT
Start: 2023-09-12 | End: 2023-09-15 | Stop reason: HOSPADM

## 2023-09-12 RX ORDER — TALC
6 POWDER (GRAM) TOPICAL NIGHTLY PRN
Status: DISCONTINUED | OUTPATIENT
Start: 2023-09-12 | End: 2023-09-15 | Stop reason: HOSPADM

## 2023-09-12 RX ORDER — ONDANSETRON 2 MG/ML
4 INJECTION INTRAMUSCULAR; INTRAVENOUS EVERY 8 HOURS PRN
Status: DISCONTINUED | OUTPATIENT
Start: 2023-09-12 | End: 2023-09-15 | Stop reason: HOSPADM

## 2023-09-12 RX ORDER — FAMOTIDINE 10 MG/ML
20 INJECTION INTRAVENOUS EVERY 12 HOURS
Status: DISCONTINUED | OUTPATIENT
Start: 2023-09-12 | End: 2023-09-13

## 2023-09-12 RX ORDER — SODIUM CHLORIDE 0.9 % (FLUSH) 0.9 %
10 SYRINGE (ML) INJECTION
Status: DISCONTINUED | OUTPATIENT
Start: 2023-09-12 | End: 2023-09-15 | Stop reason: HOSPADM

## 2023-09-12 RX ORDER — SODIUM CHLORIDE 9 MG/ML
1000 INJECTION, SOLUTION INTRAVENOUS
Status: COMPLETED | OUTPATIENT
Start: 2023-09-12 | End: 2023-09-12

## 2023-09-12 RX ORDER — LACTULOSE 10 G/15ML
200 SOLUTION ORAL; RECTAL 3 TIMES DAILY
Status: DISCONTINUED | OUTPATIENT
Start: 2023-09-12 | End: 2023-09-13

## 2023-09-12 RX ORDER — SODIUM CHLORIDE, SODIUM LACTATE, POTASSIUM CHLORIDE, CALCIUM CHLORIDE 600; 310; 30; 20 MG/100ML; MG/100ML; MG/100ML; MG/100ML
INJECTION, SOLUTION INTRAVENOUS CONTINUOUS
Status: DISCONTINUED | OUTPATIENT
Start: 2023-09-12 | End: 2023-09-13

## 2023-09-12 RX ORDER — ONDANSETRON 2 MG/ML
4 INJECTION INTRAMUSCULAR; INTRAVENOUS EVERY 4 HOURS PRN
Status: DISCONTINUED | OUTPATIENT
Start: 2023-09-12 | End: 2023-09-12

## 2023-09-12 RX ORDER — GLUCAGON 1 MG
1 KIT INJECTION
Status: DISCONTINUED | OUTPATIENT
Start: 2023-09-12 | End: 2023-09-15 | Stop reason: HOSPADM

## 2023-09-12 RX ORDER — IBUPROFEN 200 MG
16 TABLET ORAL
Status: DISCONTINUED | OUTPATIENT
Start: 2023-09-12 | End: 2023-09-15 | Stop reason: HOSPADM

## 2023-09-12 RX ADMIN — ENOXAPARIN SODIUM 30 MG: 30 INJECTION SUBCUTANEOUS at 05:09

## 2023-09-12 RX ADMIN — LACTULOSE 30 G: 10 SOLUTION ORAL at 11:09

## 2023-09-12 RX ADMIN — SODIUM CHLORIDE, POTASSIUM CHLORIDE, SODIUM LACTATE AND CALCIUM CHLORIDE: 600; 310; 30; 20 INJECTION, SOLUTION INTRAVENOUS at 05:09

## 2023-09-12 RX ADMIN — LACTULOSE 30 G: 10 SOLUTION ORAL at 05:09

## 2023-09-12 RX ADMIN — LACTULOSE 200 G: 10 SOLUTION ORAL at 11:09

## 2023-09-12 RX ADMIN — FAMOTIDINE 20 MG: 10 INJECTION, SOLUTION INTRAVENOUS at 11:09

## 2023-09-12 RX ADMIN — SODIUM CHLORIDE 1000 ML: 9 INJECTION, SOLUTION INTRAVENOUS at 11:09

## 2023-09-12 NOTE — ED PROVIDER NOTES
"Encounter Date: 9/12/2023       History     Chief Complaint   Patient presents with    Altered Mental Status     AASI with AMS since yesterday. Was seen at neurologist for encephalopathy yesterday. Responds to voice, cannot follow commands. Narcan given en route, no improvement      The history is provided by the EMS personnel. The history is limited by the condition of the patient.   Altered Mental Status  This is a new problem. The current episode started yesterday. The problem occurs constantly. Pertinent negatives include no chest pain and no shortness of breath. Nothing aggravates the symptoms. Nothing relieves the symptoms.   Patient has apparently been experiencing fairly rapid cognitive decline and was seen by neurologist yesterday.  According to EMS, the  was told "encephalopathy".  After the appointment, she returned home and has become less and less responsive and now will not speak at all.    Review of patient's allergies indicates:   Allergen Reactions    Promethazine      Other reaction(s): hallucinates    Azo-sulfisoxazole      Other reaction(s): rash    Ciprofloxacin      Other reaction(s): hives    Linaclotide      Other reaction(s): "feels awful"     Past Medical History:   Diagnosis Date    Hypercholesteremia     Hypertension     Liver cancer     Thyroid disease      Past Surgical History:   Procedure Laterality Date    ear lobe surgery      EYE SURGERY  2018     No family history on file.  Social History     Tobacco Use    Smoking status: Never     Passive exposure: Never    Smokeless tobacco: Never   Substance Use Topics    Alcohol use: Never    Drug use: Never     Review of Systems   Constitutional:  Negative for fever.   HENT:  Negative for sore throat.    Respiratory:  Negative for shortness of breath.    Cardiovascular:  Negative for chest pain.   Gastrointestinal:  Negative for nausea.   Genitourinary:  Negative for dysuria.   Musculoskeletal:  Negative for back pain.   Skin:  " Negative for rash.   Neurological:  Negative for weakness.   Hematological:  Does not bruise/bleed easily.       Physical Exam     Initial Vitals   BP Pulse Resp Temp SpO2   09/12/23 1050 09/12/23 1050 09/12/23 1110 09/12/23 1050 09/12/23 1050   114/63 73 16 98.7 °F (37.1 °C) 99 %      MAP       --                Physical Exam    Nursing note and vitals reviewed.  Constitutional: She appears well-developed.   Cachectic-appearing   HENT:   Head: Normocephalic and atraumatic.   Right Ear: External ear normal.   Left Ear: External ear normal.   Eyes: Conjunctivae and EOM are normal. Pupils are equal, round, and reactive to light.   Neck: Neck supple.   Cardiovascular:  Normal rate, regular rhythm, normal heart sounds and intact distal pulses.           Pulmonary/Chest: Breath sounds normal.   Abdominal: Abdomen is soft. Bowel sounds are normal.   Musculoskeletal:      Cervical back: Neck supple.     Neurological: No cranial nerve deficit or sensory deficit. GCS score is 15. GCS eye subscore is 4. GCS verbal subscore is 1. GCS motor subscore is 4.   Patient lying motionless in bed with mouth held open; does not follow simple commands.  Eyes deviate in direction of voice but head does not move.  Attempts to withdraw from painful stimuli.   Skin: Skin is warm and dry. Capillary refill takes less than 2 seconds.   Psychiatric: She has a normal mood and affect. Her behavior is normal. Judgment and thought content normal.         ED Course   Critical Care    Date/Time: 9/12/2023 3:20 PM    Performed by: Gentry Rico MD  Authorized by: Gentry Rico MD  Direct patient critical care time: 14 minutes  Additional history critical care time: 5 minutes  Ordering / reviewing critical care time: 5 minutes  Documentation critical care time: 14 minutes  Consulting other physicians critical care time: 3 minutes  Consult with family critical care time: 4 minutes  Total critical care time (exclusive of procedural  time) : 45 minutes  Critical care was necessary to treat or prevent imminent or life-threatening deterioration of the following conditions: CNS failure or compromise and hepatic failure.  Critical care was time spent personally by me on the following activities: development of treatment plan with patient or surrogate, discussions with consultants, interpretation of cardiac output measurements, evaluation of patient's response to treatment, examination of patient, obtaining history from patient or surrogate, ordering and performing treatments and interventions, ordering and review of laboratory studies, ordering and review of radiographic studies, pulse oximetry, re-evaluation of patient's condition and review of old charts.        Labs Reviewed   COMPREHENSIVE METABOLIC PANEL - Abnormal; Notable for the following components:       Result Value    Chloride 118 (*)     Carbon Dioxide 20 (*)     Glucose Level 138 (*)     Blood Urea Nitrogen 33.6 (*)     Albumin Level 3.3 (*)     Albumin/Globulin Ratio 0.9 (*)     Bilirubin Total 3.1 (*)     All other components within normal limits   COVID/FLU A&B PCR - Abnormal; Notable for the following components:    SARS-CoV-2 PCR Detected (*)     All other components within normal limits    Narrative:     The Xpert Xpress SARS-CoV-2/FLU/RSV plus is a rapid, multiplexed real-time PCR test intended for the simultaneous qualitative detection and differentiation of SARS-CoV-2, Influenza A, Influenza B, and respiratory syncytial virus (RSV) viral RNA in either nasopharyngeal swab or nasal swab specimens.         DRUG SCREEN, URINE (BEAKER) - Abnormal; Notable for the following components:    Benzodiazepine, Urine Positive (*)     All other components within normal limits    Narrative:     Cut off concentrations:    Amphetamines - 1000 ng/ml  Barbiturates - 200 ng/ml  Benzodiazepine - 200 ng/ml  Cannabinoids (THC) - 50 ng/ml  Cocaine - 300 ng/ml  Fentanyl - 1.0 ng/ml  MDMA - 500  ng/ml  Opiates - 300 ng/ml   Phencyclidine (PCP) - 25 ng/ml    Specimen submitted for drug analysis and tested for pH and specific gravity in order to evaluate sample integrity. Suspect tampering if specific gravity is <1.003 and/or pH is not within the range of 4.5 - 8.0  False negatives may result form substances such as bleach added to urine.  False positives may result for the presence of a substance with similar chemical structure to the drug or its metabolite.    This test provides only a PRELIMINARY analytical test result. A more specific alternate chemical method must be used in order to obtain a confirmed analytical result. Gas chromatography/mass spectrometry (GC/MS) is the preferred confirmatory method. Other chemical confirmation methods are available. Clinical consideration and professional judgement should be applied to any drug of abuse test result, particularly when preliminary positive results are used.    Positive results will be confirmed only at the physicians request. Unconfirmed screening results are to be used only for medical purposes (treatment).        PROTIME-INR - Abnormal; Notable for the following components:    PT 17.2 (*)     INR 1.4 (*)     All other components within normal limits   TSH - Abnormal; Notable for the following components:    Thyroid Stimulating Hormone 0.195 (*)     All other components within normal limits   URINALYSIS, REFLEX TO URINE CULTURE - Abnormal; Notable for the following components:    Blood, UA Trace (*)     Leukocyte Esterase, UA Trace (*)     All other components within normal limits   AMMONIA - Abnormal; Notable for the following components:    Ammonia Level 136.4 (*)     All other components within normal limits   CBC WITH DIFFERENTIAL - Abnormal; Notable for the following components:    MCV 96.9 (*)     MCH 33.2 (*)     RDW 17.2 (*)     Platelet 88 (*)     MPV 10.5 (*)     All other components within normal limits   ALCOHOL,MEDICAL (ETHANOL) - Normal    LACTIC ACID, PLASMA - Normal   MAGNESIUM - Normal   APTT - Normal   TROPONIN I - Normal   URINALYSIS, MICROSCOPIC - Normal   CBC W/ AUTO DIFFERENTIAL    Narrative:     The following orders were created for panel order CBC auto differential.  Procedure                               Abnormality         Status                     ---------                               -----------         ------                     CBC with Differential[[id number]]       Abnormal            Final result                 Please view results for these tests on the individual orders.          Imaging Results              CT Head Without Contrast (Final result)  Result time 09/12/23 11:49:41      Final result by Casimiro Tirado MD (09/12/23 11:49:41)                   Impression:      No acute intracranial findings.      Electronically signed by: Casimiro Tirado  Date:    09/12/2023  Time:    11:49               Narrative:    EXAMINATION:  CT HEAD WITHOUT CONTRAST    CLINICAL HISTORY:  Mental status change, unknown cause;    TECHNIQUE:  CT imaging of the head performed from the skull base to the vertex without intravenous contrast. DLP 1010 mGycm. Automatic exposure control, adjustment of mA/kV or iterative reconstruction technique was used to reduce radiation.    COMPARISON:  28 August 2023    FINDINGS:  There is no acute cortical infarct, hemorrhage or mass lesion.  No new parenchymal attenuation abnormality.  Ventricular size is stable.  There are vascular calcifications.    Visualized paranasal sinuses and mastoid air cells are clear.                                       X-Ray Chest AP Portable (Final result)  Result time 09/12/23 12:16:13      Final result by Parminder Beverly MD (09/12/23 12:16:13)                   Impression:      No acute chest disease is identified.      Electronically signed by: Parminder Beverly  Date:    09/12/2023  Time:    12:16               Narrative:    EXAMINATION:  XR CHEST AP PORTABLE    CLINICAL  HISTORY:  altered mental status;, .    COMPARISON:  August 28, 2023    FINDINGS:  No alveolar consolidation, effusion, or pneumothorax is seen.   The thoracic aorta is normal  cardiac silhouette, central pulmonary vessels and mediastinum are normal in size and are grossly unremarkable.   visualized osseous structures are grossly unremarkable.                                       Medications   0.9%  NaCl infusion (1,000 mLs Intravenous New Bag 9/12/23 1148)     Medical Decision Making  Amount and/or Complexity of Data Reviewed  External Data Reviewed: notes.     Details: Reviewed clinic note from Dr. Dominguez's office outlining her cognitive decline  Labs: ordered. Decision-making details documented in ED Course.  Radiology: ordered. Decision-making details documented in ED Course.  ECG/medicine tests: ordered and independent interpretation performed. Decision-making details documented in ED Course.    Risk  Prescription drug management.  Decision regarding hospitalization.    Differential includes:  hepatic encephalopathy, CVA, toxic encephalopathy, medication side effect, infectious process, ICH, dementia, PML, ALS, myxedema coma, seizure.  Will obtain head CT, CBC, CMP, coags, ammonia, TSH, troponin, UA, UDS, EtOH and EKG, CXR.  Will undoubtedly require admission.                 Discussed with , who provides independent history.  She has a h/o hepatitis C and hepatic malignancy and is s/p multiple TACE procedures at [facility].  Over the past 6-8 weeks, she has had a fairly rapid decline in function to the point where she can no longer walk or talk.  Saw Dr. Landon yesterday and had EEG and was given F/U appointment for next week.  Discussed hyperammonemia and role it plays in neurologic function.  Will admit for hepatic encephalopathy.  May need NGT to administer lactulose vs. Enema administration, as I don't know that she is cognizant enough to take it voluntarily.  I spoke with ROCIO Al ([facility]  medicine) who states to admit to Dr. Ponce.          Clinical Impression:   Final diagnoses:  [R41.82] Altered mental status  [K76.82] Hepatic encephalopathy (Primary)        ED Disposition Condition    Admit Stable                Gentry Rico MD  09/12/23 [id number]

## 2023-09-12 NOTE — H&P
[facility] Hospital Medicine History & Physical Examination       Patient Name: Tessa Dozier  MRN: [id number]  Patient Class: IP- Inpatient   Admission Date: 9/12/2023   Admitting Physician: Ezra Ponce MD   Length of Stay: 0  Attending Physician: Ezra Ponce MD   Primary Care Provider: Angélica Dominguez MD  Face-to-Face encounter date: 09/12/2023  Code Status: Full Code  Chief Complaint: Altered Mental Status (AASI with AMS since yesterday. Was seen at neurologist for encephalopathy yesterday. Responds to voice, cannot follow commands. Narcan given en route, no improvement )        Patient information was obtained from patient, patient's family, past medical records and ER records.     HISTORY OF PRESENT ILLNESS:   Tessa Dozier is a 83 y.o. White female with a past medical history of hypertension, hyperlipidemia, hypothyroidism, hepatitis-C and liver cancer status post multiple TACE procedures.  Patient was admitted to hospital medicine services 08/08/2023 to 08/14/2023 for sepsis secondary to COVID-19 infection which she tested positive on 08/07/2023.  She was treated with remdesivir and Decadron.  Patient was also noted to have NSTEMI at this time.  Since she is been having a rapid decline and function to where she is no longer able to walk or talk.  The patient presented to [facility] on 9/12/2023 with a primary complaint of altered mental status which began yesterday (09/11/2023).  Patient unable to give history and no family at bedside therefore history information gathered from chart.  Patient was seen by neurologist yesterday which EEG was performed and follow-up appointment was scheduled.  After returning home patient became less responsive and not able to speak.    Upon presentation to the ED, temperature 98.7° F, heart rate 73, blood pressure 114/63, respiratory rate 16 SpO2 99% on room air.  Labs with WBC 5.31, CO2 20, BUN/creatinine 33.6/0.77, total bilirubin 3.1,  INR 1.4, PT 17.2, PTT 31.4, troponin less than 0.01, lactic acid 1.7.  Alcohol level less than 10.  Ammonia level 136.4.  UDS positive for benzos.  Influenza A and B negative. SARS-CoV-2 PCR positive.  UA negative for infection.  Chest x-ray normal sinus rhythm and right bundle-branch block.  Chest x-ray no acute chest disease.  CT of the head no acute intracranial findings.  In the ED patient received IV fluid hydration.  Patient was admitted to hospital medicine services for further medical management.    PAST MEDICAL HISTORY:     Past Medical History:   Diagnosis Date    Hypercholesteremia     Hypertension     Liver cancer     Thyroid disease        PAST SURGICAL HISTORY:     Past Surgical History:   Procedure Laterality Date    ear lobe surgery      EYE SURGERY  2018       ALLERGIES:   Promethazine, Azo-sulfisoxazole, Ciprofloxacin, and Linaclotide    FAMILY HISTORY:   Unable to obtain due to medical condition    SOCIAL HISTORY:   Unable to obtain due to medical condition    HOME MEDICATIONS:     Prior to Admission medications    Medication Sig Start Date End Date Taking? Authorizing Provider   ALPRAZolam (XANAX) 2 MG Tab Take 1 tablet (2 mg total) by mouth every 24 hours as needed (anxiety). 8/15/23   Angélica Dominguez MD   carvediloL (COREG) 3.125 MG tablet TAKE 1 TABLET BY MOUTH TWICE A DAY 1/9/23   Angélica Dominguez MD   hydrocortisone (ANUSOL-HC) 2.5 % rectal cream Place rectally 2 (two) times daily as needed for Hemorrhoids. 9/6/23   Angélica Dominguez MD   levothyroxine (SYNTHROID) 75 MCG tablet Take 1 tablet (75 mcg total) by mouth before breakfast. 7/5/23   Angélica Dominguez MD   mupirocin (BACTROBAN) 2 % ointment Apply topically 3 (three) times daily. 8/28/23   Flor Lan NP   pravastatin (PRAVACHOL) 40 MG tablet Take 40 mg by mouth once daily. 4/8/23   Provider, Historical       REVIEW OF SYSTEMS:   Except as documented, all other systems reviewed and negative     PHYSICAL  EXAM:     VITAL SIGNS: 24 HRS MIN & MAX LAST   Temp  Min: 98.7 °F (37.1 °C)  Max: 98.7 °F (37.1 °C) 98.7 °F (37.1 °C)   BP  Min: 114/63  Max: 138/76 138/76   Pulse  Min: 67  Max: 73  70   Resp  Min: 12  Max: 22 12   SpO2  Min: 94 %  Max: 99 % 96 %       General appearance: Well-developed, well-nourished female in no apparent distress.  No family  at bedside.  HEENT: Atraumatic head.  Dry mucous membranes of oral cavity.  Lungs: Clear to auscultation bilaterally.   Heart: Regular rate and rhythm.   Abdomen: Soft, non-distended, non-tender. Bowel sounds are normal.   Extremities: No cyanosis, clubbing. No deformities.  Skin: No Rash. Warm and dry.  Neuro:  Lethargic, mumbles, does not follow commands.   Psych/mental status: Appropriate mood and affect. Cooperative. Responds appropriately to questions.       LABS AND IMAGING:     Recent Labs   Lab 09/12/23  1147   WBC 5.31   RBC 4.22   HGB 14.0   HCT 40.9   MCV 96.9*   MCH 33.2*   MCHC 34.2   RDW 17.2*   PLT 88*   MPV 10.5*       Recent Labs   Lab 09/12/23  1147      K 4.4   CO2 20*   BUN 33.6*   CREATININE 0.77   CALCIUM 9.0   MG 2.20   ALBUMIN 3.3*   ALKPHOS 93   ALT 18   AST 32   BILITOT 3.1*       Microbiology Results (last 7 days)       ** No results found for the last 168 hours. **             X-Ray Chest AP Portable  Narrative: EXAMINATION:  XR CHEST AP PORTABLE    CLINICAL HISTORY:  altered mental status;, .    COMPARISON:  August 28, 2023    FINDINGS:  No alveolar consolidation, effusion, or pneumothorax is seen.   The thoracic aorta is normal  cardiac silhouette, central pulmonary vessels and mediastinum are normal in size and are grossly unremarkable.   visualized osseous structures are grossly unremarkable.  Impression: No acute chest disease is identified.    Electronically signed by: Parminder Beverly  Date:    09/12/2023  Time:    12:16  CT Head Without Contrast  Narrative: EXAMINATION:  CT HEAD WITHOUT CONTRAST    CLINICAL HISTORY:  Mental status  change, unknown cause;    TECHNIQUE:  CT imaging of the head performed from the skull base to the vertex without intravenous contrast. DLP 1010 mGycm. Automatic exposure control, adjustment of mA/kV or iterative reconstruction technique was used to reduce radiation.    COMPARISON:  28 August 2023    FINDINGS:  There is no acute cortical infarct, hemorrhage or mass lesion.  No new parenchymal attenuation abnormality.  Ventricular size is stable.  There are vascular calcifications.    Visualized paranasal sinuses and mastoid air cells are clear.  Impression: No acute intracranial findings.    Electronically signed by: Casimiro Tirado  Date:    09/12/2023  Time:    11:49        ASSESSMENT & PLAN:   Assessment:  Acute hepatic encephalopathy secondary to liver cancer   Hyperbilirubinemia   Asymptomatic COVID 19 infection vs prolonged positive result  History of hypertension, hyperlipidemia, hypothyroidism, hepatitis-C and liver cancer status post multiple TACE procedures    - Lactulose via enema TID  - NPO   - Patient not requiring oxygen therefore Decadron and remdesivir not indicated  - Resume appropriate home medications when deemed necessary   - Labs in AM      VTE Prophylaxis: will be placed on SCD for DVT prophylaxis and will be advised to be as mobile as possible and sit in a chair as tolerated      __________________________________________________________________________  INPATIENT LIST OF MEDICATIONS     Current Facility-Administered Medications:     dextrose 10% bolus 125 mL 125 mL, 12.5 g, Intravenous, PRN, Mikaela Sanchez, PA-C    dextrose 10% bolus 250 mL 250 mL, 25 g, Intravenous, PRN, Mikaela Sanchez., PA-C    glucagon (human recombinant) injection 1 mg, 1 mg, Intramuscular, PRN, Mikaela Sanchez, PA-C    glucose chewable tablet 16 g, 16 g, Oral, PRN, Mikaela Sanchez, PA-C    glucose chewable tablet 24 g, 24 g, Oral, PRN, Mikaela Sanchez, PA-C    lactulose 10 gram/15 mL enema solution (inpatient use)  200 g, 200 g, Rectal, TID, Ezra Ponce MD    ondansetron injection 4 mg, 4 mg, Intravenous, Q4H PRN, Mikaela Sanchez PA-C    sodium chloride 0.9% flush 10 mL, 10 mL, Intravenous, Q12H PRN, Mikaela Sanchez PA-C    Current Outpatient Medications:     ALPRAZolam (XANAX) 2 MG Tab, Take 1 tablet (2 mg total) by mouth every 24 hours as needed (anxiety)., Disp: 30 tablet, Rfl: 2    carvediloL (COREG) 3.125 MG tablet, TAKE 1 TABLET BY MOUTH TWICE A DAY, Disp: 180 tablet, Rfl: 3    hydrocortisone (ANUSOL-HC) 2.5 % rectal cream, Place rectally 2 (two) times daily as needed for Hemorrhoids., Disp: 28 g, Rfl: 3    levothyroxine (SYNTHROID) 75 MCG tablet, Take 1 tablet (75 mcg total) by mouth before breakfast., Disp: 90 tablet, Rfl: 3    mupirocin (BACTROBAN) 2 % ointment, Apply topically 3 (three) times daily., Disp: 15 g, Rfl: 0    pravastatin (PRAVACHOL) 40 MG tablet, Take 40 mg by mouth once daily., Disp: , Rfl:       Scheduled Meds:   lactulose  200 g Rectal TID     Continuous Infusions:  PRN Meds:.dextrose 10%, dextrose 10%, glucagon (human recombinant), glucose, glucose, ondansetron, sodium chloride 0.9%      Discharge Planning and Disposition: Anticipated discharge to be determined.    I, ROCIO Liu, have reviewed and discussed the case with Dr. Ezra Ponce MD    Please see the following addendum for further assessment and plan from there attending MD.    Mikaela Sanchez PA-C  09/12/2023    Seen and examined the patient in the day of admission, Agree with above history PE and management.   Presenting with hepatic encephalopathy and high ammonia level. Has TACE procedure in the past which also precipitates this condition.  She is very lethargic however, follow was able to open eyes and follow commands minimally.   - will start lactulose, GI consult.   - NG tube for meds and nutrition.   - Asymptomatic COVID-19 infection vs lingering positivity. Continue to isolation.       Ezra Ponce M.D.  [facility]  Medicine Department  [facility]

## 2023-09-13 LAB
ALBUMIN SERPL-MCNC: 3 G/DL (ref 3.4–4.8)
ALBUMIN/GLOB SERPL: 0.9 RATIO (ref 1.1–2)
ALP SERPL-CCNC: 88 UNIT/L (ref 40–150)
ALT SERPL-CCNC: 23 UNIT/L (ref 0–55)
AST SERPL-CCNC: 39 UNIT/L (ref 5–34)
BASOPHILS # BLD AUTO: 0.07 X10(3)/MCL
BASOPHILS NFR BLD AUTO: 1.1 %
BILIRUB SERPL-MCNC: 3.1 MG/DL
BUN SERPL-MCNC: 31.2 MG/DL (ref 9.8–20.1)
CALCIUM SERPL-MCNC: 8.6 MG/DL (ref 8.4–10.2)
CHLORIDE SERPL-SCNC: 121 MMOL/L (ref 98–107)
CO2 SERPL-SCNC: 17 MMOL/L (ref 23–31)
CREAT SERPL-MCNC: 0.71 MG/DL (ref 0.55–1.02)
EOSINOPHIL # BLD AUTO: 0.08 X10(3)/MCL (ref 0–0.9)
EOSINOPHIL NFR BLD AUTO: 1.3 %
ERYTHROCYTE [DISTWIDTH] IN BLOOD BY AUTOMATED COUNT: 17.1 % (ref 11.5–17)
GFR SERPLBLD CREATININE-BSD FMLA CKD-EPI: >60 MLS/MIN/1.73/M2
GLOBULIN SER-MCNC: 3.2 GM/DL (ref 2.4–3.5)
GLUCOSE SERPL-MCNC: 109 MG/DL (ref 82–115)
HCT VFR BLD AUTO: 37.8 % (ref 37–47)
HGB BLD-MCNC: 13.1 G/DL (ref 12–16)
IMM GRANULOCYTES # BLD AUTO: 0.01 X10(3)/MCL (ref 0–0.04)
IMM GRANULOCYTES NFR BLD AUTO: 0.2 %
LYMPHOCYTES # BLD AUTO: 1.16 X10(3)/MCL (ref 0.6–4.6)
LYMPHOCYTES NFR BLD AUTO: 18.2 %
MCH RBC QN AUTO: 33.1 PG (ref 27–31)
MCHC RBC AUTO-ENTMCNC: 34.7 G/DL (ref 33–36)
MCV RBC AUTO: 95.5 FL (ref 80–94)
MONOCYTES # BLD AUTO: 1.02 X10(3)/MCL (ref 0.1–1.3)
MONOCYTES NFR BLD AUTO: 16 %
NEUTROPHILS # BLD AUTO: 4.04 X10(3)/MCL (ref 2.1–9.2)
NEUTROPHILS NFR BLD AUTO: 63.2 %
NRBC BLD AUTO-RTO: 0 %
PLATELET # BLD AUTO: 89 X10(3)/MCL (ref 130–400)
PMV BLD AUTO: 11.3 FL (ref 7.4–10.4)
POTASSIUM SERPL-SCNC: 3.7 MMOL/L (ref 3.5–5.1)
PROT SERPL-MCNC: 6.2 GM/DL (ref 5.8–7.6)
RBC # BLD AUTO: 3.96 X10(6)/MCL (ref 4.2–5.4)
SODIUM SERPL-SCNC: 147 MMOL/L (ref 136–145)
WBC # SPEC AUTO: 6.38 X10(3)/MCL (ref 4.5–11.5)

## 2023-09-13 PROCEDURE — 92610 EVALUATE SWALLOWING FUNCTION: CPT

## 2023-09-13 PROCEDURE — 63600175 PHARM REV CODE 636 W HCPCS: Performed by: EMERGENCY MEDICINE

## 2023-09-13 PROCEDURE — 25000003 PHARM REV CODE 250: Performed by: INTERNAL MEDICINE

## 2023-09-13 PROCEDURE — 25000003 PHARM REV CODE 250: Performed by: EMERGENCY MEDICINE

## 2023-09-13 PROCEDURE — 80053 COMPREHEN METABOLIC PANEL: CPT | Performed by: PHYSICIAN ASSISTANT

## 2023-09-13 PROCEDURE — 27000207 HC ISOLATION

## 2023-09-13 PROCEDURE — 25000003 PHARM REV CODE 250: Performed by: STUDENT IN AN ORGANIZED HEALTH CARE EDUCATION/TRAINING PROGRAM

## 2023-09-13 PROCEDURE — 85025 COMPLETE CBC W/AUTO DIFF WBC: CPT | Performed by: PHYSICIAN ASSISTANT

## 2023-09-13 PROCEDURE — 63600175 PHARM REV CODE 636 W HCPCS: Performed by: INTERNAL MEDICINE

## 2023-09-13 PROCEDURE — 11000001 HC ACUTE MED/SURG PRIVATE ROOM

## 2023-09-13 RX ORDER — DEXTROSE MONOHYDRATE, SODIUM CHLORIDE, AND POTASSIUM CHLORIDE 50; 1.49; 4.5 G/1000ML; G/1000ML; G/1000ML
INJECTION, SOLUTION INTRAVENOUS CONTINUOUS
Status: DISCONTINUED | OUTPATIENT
Start: 2023-09-13 | End: 2023-09-15

## 2023-09-13 RX ORDER — MUPIROCIN 20 MG/G
OINTMENT TOPICAL 2 TIMES DAILY
Status: DISCONTINUED | OUTPATIENT
Start: 2023-09-13 | End: 2023-09-15 | Stop reason: HOSPADM

## 2023-09-13 RX ADMIN — MUPIROCIN: 20 OINTMENT TOPICAL at 11:09

## 2023-09-13 RX ADMIN — LACTULOSE 30 G: 10 SOLUTION ORAL at 03:09

## 2023-09-13 RX ADMIN — ENOXAPARIN SODIUM 30 MG: 30 INJECTION SUBCUTANEOUS at 04:09

## 2023-09-13 RX ADMIN — RIFAXIMIN 550 MG: 550 TABLET ORAL at 09:09

## 2023-09-13 RX ADMIN — POTASSIUM CHLORIDE, DEXTROSE MONOHYDRATE AND SODIUM CHLORIDE: 150; 5; 450 INJECTION, SOLUTION INTRAVENOUS at 03:09

## 2023-09-13 RX ADMIN — FAMOTIDINE 20 MG: 10 INJECTION, SOLUTION INTRAVENOUS at 09:09

## 2023-09-13 RX ADMIN — MUPIROCIN: 20 OINTMENT TOPICAL at 09:09

## 2023-09-13 RX ADMIN — LACTULOSE 30 G: 10 SOLUTION ORAL at 08:09

## 2023-09-13 RX ADMIN — LACTULOSE 200 G: 10 SOLUTION ORAL at 08:09

## 2023-09-13 RX ADMIN — LACTULOSE 30 G: 10 SOLUTION ORAL at 09:09

## 2023-09-13 RX ADMIN — RIFAXIMIN 550 MG: 550 TABLET ORAL at 03:09

## 2023-09-13 NOTE — NURSING
Nurses Note -- 4 Eyes      9/13/2023   12:13 AM      Skin assessed during: Admit      [x] No Altered Skin Integrity Present    []Prevention Measures Documented      [] Yes- Altered Skin Integrity Present or Discovered   [] LDA Added if Not in Epic (Describe Wound)   [] New Altered Skin Integrity was Present on Admit and Documented in LDA   [] Wound Image Taken    Wound Care Consulted? No    Attending Nurse:  Judy neal RN    Second RN/Staff Member:   Arminda Jorgensen RN

## 2023-09-13 NOTE — PLAN OF CARE
Problem: Adult Inpatient Plan of Care  Goal: Plan of Care Review  Outcome: Ongoing, Progressing  Goal: Patient-Specific Goal (Individualized)  Outcome: Ongoing, Not Progressing  Goal: Absence of Hospital-Acquired Illness or Injury  Outcome: Ongoing, Progressing  Goal: Optimal Comfort and Wellbeing  Outcome: Ongoing, Not Progressing  Goal: Readiness for Transition of Care  Outcome: Ongoing, Not Progressing     Problem: Skin Injury Risk Increased  Goal: Skin Health and Integrity  Outcome: Ongoing, Progressing     Problem: Infection  Goal: Absence of Infection Signs and Symptoms  Outcome: Ongoing, Progressing

## 2023-09-13 NOTE — PROGRESS NOTES
[facility] - 5th Floor Med Surg  [facility] MEDICINE ~ PROGRESS NOTE        CHIEF COMPLAINT   Hospital follow up    HOSPITAL COURSE   Tessa Dozier is a 83 y.o. White female with a past medical history of hypertension, hyperlipidemia, hypothyroidism, hepatitis-C and liver cancer status post multiple TACE procedures.  Patient was admitted to hospital medicine services 08/08/2023 to 08/14/2023 for sepsis secondary to COVID-19 infection which she tested positive on 08/07/2023.  She was treated with remdesivir and Decadron.  Patient was also noted to have NSTEMI at this time.  Since she is been having a rapid decline and function to where she is no longer able to walk or talk.  The patient presented to [facility] on 9/12/2023 with a primary complaint of altered mental status which began yesterday (09/11/2023).  Patient unable to give history and no family at bedside therefore history information gathered from chart.  Patient was seen by neurologist yesterday which EEG was performed and follow-up appointment was scheduled.  After returning home patient became less responsive and not able to speak.   Upon presentation to the ED, temperature 98.7° F, heart rate 73, blood pressure 114/63, respiratory rate 16 SpO2 99% on room air.  Labs with WBC 5.31, CO2 20, BUN/creatinine 33.6/0.77, total bilirubin 3.1, INR 1.4, PT 17.2, PTT 31.4, troponin less than 0.01, lactic acid 1.7.  Alcohol level less than 10.  Ammonia level 136.4.  UDS positive for benzos.  Influenza A and B negative. SARS-CoV-2 PCR positive.  UA negative for infection.  Chest x-ray normal sinus rhythm and right bundle-branch block.  Chest x-ray no acute chest disease.  CT of the head no acute intracranial findings.  In the ED patient received IV fluid hydration.  Patient was admitted to hospital medicine services for further medical management.    Today  Per the  she is more alert today.  We will continue with the lactulose.  Did not pass her  swallow evaluation yet.  We will start tube feeds.   tells me that she has had a steady slow decline over the last few years but more drastically since June or July of this year and that is why she was seeing Neurology as outpatient.        OBJECTIVE/PHYSICAL EXAM     VITAL SIGNS (MOST RECENT):  Temp: 98.8 °F (37.1 °C) (09/13/23 1105)  Pulse: 74 (09/13/23 1105)  Resp: 18 (09/13/23 1105)  BP: (!) 154/79 (09/13/23 1105)  SpO2: 98 % (09/13/23 1105) VITAL SIGNS (24 HOUR RANGE):  Temp:  [98.6 °F (37 °C)-98.8 °F (37.1 °C)] 98.8 °F (37.1 °C)  Pulse:  [74-79] 74  Resp:  [18-24] 18  SpO2:  [96 %-98 %] 98 %  BP: (144-159)/(68-82) 154/79   GENERAL: In no acute distress, afebrile  HEENT:  Dry mouth  CHEST: Clear to auscultation bilaterally  HEART: S1, S2, no appreciable murmur  ABDOMEN: Soft, nontender, BS +  MSK: Warm, no lower extremity edema, no clubbing or cyanosis  NEUROLOGIC: Alert and oriented x1  INTEGUMENTARY:  PSYCHIATRY:        ASSESSMENT/PLAN   Acute hepatic encephalopathy   Decompensated cirrhosis   Hypernatremia   COVID-19 infection versus positive test postinfection    History of: HTN, HLD, hypothyroidism, hepatitis-C treated, liver cancer status post multiple TACE procedures      Will ask Infectious Disease to see if we need to continue isolation given positive COVID in August 7th.  Reinfection versus post COVID positive test.  Continue lactulose 30 g t.i.d. to have 3 bowel movements per day  Will order for MRI brain with and without contrast, previous 1 few months ago was abnormal.     stated that she has had slow progressive decline over the last 2 3 years but more so drastically in the last few months.  No evidence of metastasis or recurrence of cancer per the , follows with hepatologist at [facility] following.  Start tube feeds today once we have some recommendations from dietitian.  Previously noted a portal vein thrombosis, will order for Doppler ultrasound again to follow-up.    DVT  "prophylaxis:  Lovenox 30    Critical care diagnosis:  Hepatic encephalopathy  Critical care time spent:  35 minutes    Anticipated discharge and disposition:   __________________________________________________________________________    LABS/MICRO/MEDS/DIAGNOSTICS       LABS  Recent Labs     09/13/23 0527   *   K 3.7   CHLORIDE 121*   CO2 17*   BUN 31.2*   CREATININE 0.71   GLUCOSE 109   CALCIUM 8.6   ALKPHOS 88   AST 39*   ALT 23   ALBUMIN 3.0*     Recent Labs     09/13/23 0527   WBC 6.38   RBC 3.96*   HCT 37.8   MCV 95.5*   PLT 89*       MICROBIOLOGY  Microbiology Results (last 7 days)       ** No results found for the last 168 hours. **               MEDICATIONS   enoxparin  30 mg Subcutaneous Daily    famotidine (PF)  20 mg Intravenous Q12H    lactulose 10 gram/15 ml  30 g Per NG tube TID    mupirocin   Nasal BID         INFUSIONS   lactated ringers 125 mL/hr at 09/12/23 1736          DIAGNOSTIC TESTS  XR Gastric tube check, non-radiologist performed   Final Result      As above.         Electronically signed by: Casimiro Tirado   Date:    09/12/2023   Time:    16:33      CT Head Without Contrast   Final Result      No acute intracranial findings.         Electronically signed by: Casimiro Tirado   Date:    09/12/2023   Time:    11:49      X-Ray Chest AP Portable   Final Result      No acute chest disease is identified.         Electronically signed by: Parminder Beverly   Date:    09/12/2023   Time:    12:16           No results found for: "EF"       NUTRITION STATUS  Patient meets ASPEN criteria for   malnutrition of   per RD assessment as evidenced by:                       A minimum of two characteristics is recommended for diagnosis of either severe or non-severe malnutrition.       Case related differential diagnoses have been reviewed; assessment and plan has been documented. I have personally reviewed the labs and test results that are currently available; I have reviewed the patients medication list. " I have reviewed the consulting providers recommendations. I have reviewed or attempted to review medical records based upon their availability.  All of the patient's and/or family's questions have been addressed and answered to the best of my ability.  I will continue to monitor closely and make adjustments to medical management as needed.  This document was created using [employer] Fluency Direct.  Transcription errors may have been made.  Please contact me if any questions may rise regarding documentation to clarify transcription.        Jasmeet Rush MD   Internal Medicine  Department of Hospital Medicine  [facility] - 5th Floor Med Surg

## 2023-09-13 NOTE — PT/OT/SLP EVAL
"[facility]  Speech Language Pathology Department  Clinical Swallow Evaluation    Patient Name:  Tessa Dozier   MRN:  [id number]    Recommendations:     General recommendations:  Modified Barium Swallow Study when mental status returns to baseline  Diet recommendations:  NPO  Medications: via NG tube  Precautions: Standard, aspiration    History:     Tessa Dozier is a/n 83 y.o. female admitted with AMS.  Pt was seen by a neurologist on Monday for encephalopathy.  CT of the head upon admission was negative for acute changes.  Pt with recent Covid infection requiring hospitalization.    Past Medical History:   Diagnosis Date    Hypercholesteremia     Hypertension     Liver cancer     Thyroid disease      Past Surgical History:   Procedure Laterality Date    ear lobe surgery      EYE SURGERY  2018     Home Diet: Regular and thin liquids (per )  Current Method of Nutrition: NPO    Patient complaint: Pt's  reports no difficulty with PO intake at home and she "loves that boost"    Imaging   Results for orders placed during the hospital encounter of 08/08/23    X-Ray Chest 1 View    Narrative  EXAMINATION:  XR CHEST 1 VIEW    CLINICAL HISTORY:  Shortness of breath    COMPARISON:  08/07/2023    FINDINGS:  Single view of the chest shows new small left pleural fluid.  No pneumothorax or lobar type consolidation.  Aorta is partially calcified.  Heart is upper normal in size.    Impression  Small left pleural effusion, new from the prior study      Electronically signed by: Nik Miguel MD  Date:    08/08/2023  Time:    12:40    Subjective     Patient awake, alert, flat, and nonverbal.    Patient goals: unable to state     Spiritual/Cultural/Caodaism Beliefs/Practices that affect care: no  Pain/Comfort: Pain Rating 1: 0/10    Respiratory status: room air  Restraints/positioning devices: none    Objective:     ORAL MUSCULATURE  Dentition: own teeth  Secretion Management: left " corner drooling  Mucosal Quality: good  Facial Movement: masked facies  Buccal Strength & Mobility: impaired  Mandibular Strength & Mobility: WFL  Oral Labial Strength & Mobility: impaired retraction, impaired pursing, and impaired seal  Lingual Strength & Mobility: impaired strength, impaired depression, impaired protrusion, impaired left lateral movement, and impaired right lateral movement  Velar Elevation: unable to assess  Vocal Quality: strained/strangled    Consistency Fed By Oral Symptoms Pharyngeal Symptoms   Puree SLP Slowed oral transit time Multiple swallows  Wet vocal quality after swallow   Thin liquid by straw SLP None Multiple swallows  Wet vocal quality after swallow  Cough after swallow     Assessment:     Pt presents with signs/sx oropharyngeal dysphagia warranting comprehensive assessment of swallow function to determine safety of PO intake.    Patient Education:     Pt's  provided with verbal education regarding SLP POC and risks of aspiration.  Understanding was verbalized.    Plan:     SLP Follow-Up:  Yes   Plan of Care reviewed with:  patient, spouse      Time Tracking:     SLP Treatment Date:   09/13/23  Speech Start Time:  1105  Speech Stop Time:  1115     Speech Total Time (min):  10 min    Billable minutes:  Swallow and Oral Function Evaluation, 10 minutes     09/13/2023

## 2023-09-14 LAB
AFP-TM SERPL-MCNC: 3.5 NG/ML
ALBUMIN SERPL-MCNC: 2.7 G/DL (ref 3.4–4.8)
ALBUMIN/GLOB SERPL: 0.9 RATIO (ref 1.1–2)
ALP SERPL-CCNC: 82 UNIT/L (ref 40–150)
ALT SERPL-CCNC: 20 UNIT/L (ref 0–55)
AMMONIA PLAS-MSCNC: 65.3 UMOL/L (ref 18–72)
AST SERPL-CCNC: 34 UNIT/L (ref 5–34)
BASOPHILS # BLD AUTO: 0.06 X10(3)/MCL
BASOPHILS NFR BLD AUTO: 0.7 %
BILIRUB SERPL-MCNC: 2.9 MG/DL
BUN SERPL-MCNC: 25.4 MG/DL (ref 9.8–20.1)
CALCIUM SERPL-MCNC: 8 MG/DL (ref 8.4–10.2)
CHLORIDE SERPL-SCNC: 117 MMOL/L (ref 98–107)
CO2 SERPL-SCNC: 17 MMOL/L (ref 23–31)
CREAT SERPL-MCNC: 0.64 MG/DL (ref 0.55–1.02)
EOSINOPHIL # BLD AUTO: 0.1 X10(3)/MCL (ref 0–0.9)
EOSINOPHIL NFR BLD AUTO: 1.2 %
ERYTHROCYTE [DISTWIDTH] IN BLOOD BY AUTOMATED COUNT: 17 % (ref 11.5–17)
GFR SERPLBLD CREATININE-BSD FMLA CKD-EPI: >60 MLS/MIN/1.73/M2
GLOBULIN SER-MCNC: 2.9 GM/DL (ref 2.4–3.5)
GLUCOSE SERPL-MCNC: 140 MG/DL (ref 82–115)
HCT VFR BLD AUTO: 36.8 % (ref 37–47)
HGB BLD-MCNC: 12.6 G/DL (ref 12–16)
IMM GRANULOCYTES # BLD AUTO: 0.04 X10(3)/MCL (ref 0–0.04)
IMM GRANULOCYTES NFR BLD AUTO: 0.5 %
INR PPP: 1.6
LYMPHOCYTES # BLD AUTO: 1.19 X10(3)/MCL (ref 0.6–4.6)
LYMPHOCYTES NFR BLD AUTO: 14.5 %
MCH RBC QN AUTO: 33 PG (ref 27–31)
MCHC RBC AUTO-ENTMCNC: 34.2 G/DL (ref 33–36)
MCV RBC AUTO: 96.3 FL (ref 80–94)
MONOCYTES # BLD AUTO: 1.21 X10(3)/MCL (ref 0.1–1.3)
MONOCYTES NFR BLD AUTO: 14.7 %
NEUTROPHILS # BLD AUTO: 5.63 X10(3)/MCL (ref 2.1–9.2)
NEUTROPHILS NFR BLD AUTO: 68.4 %
NRBC BLD AUTO-RTO: 0 %
PHOSPHATE SERPL-MCNC: 2 MG/DL (ref 2.3–4.7)
PLATELET # BLD AUTO: 72 X10(3)/MCL (ref 130–400)
PMV BLD AUTO: 10.7 FL (ref 7.4–10.4)
POTASSIUM SERPL-SCNC: 3.7 MMOL/L (ref 3.5–5.1)
PROT SERPL-MCNC: 5.6 GM/DL (ref 5.8–7.6)
PROTHROMBIN TIME: 19 SECONDS (ref 12.5–14.5)
RBC # BLD AUTO: 3.82 X10(6)/MCL (ref 4.2–5.4)
SODIUM SERPL-SCNC: 141 MMOL/L (ref 136–145)
WBC # SPEC AUTO: 8.23 X10(3)/MCL (ref 4.5–11.5)

## 2023-09-14 PROCEDURE — 97166 OT EVAL MOD COMPLEX 45 MIN: CPT

## 2023-09-14 PROCEDURE — 99223 PR INITIAL HOSPITAL CARE,LEVL III: ICD-10-PCS | Mod: ,,, | Performed by: PSYCHIATRY & NEUROLOGY

## 2023-09-14 PROCEDURE — 25000003 PHARM REV CODE 250: Performed by: STUDENT IN AN ORGANIZED HEALTH CARE EDUCATION/TRAINING PROGRAM

## 2023-09-14 PROCEDURE — 85610 PROTHROMBIN TIME: CPT | Performed by: INTERNAL MEDICINE

## 2023-09-14 PROCEDURE — A9698 NON-RAD CONTRAST MATERIALNOC: HCPCS | Performed by: INTERNAL MEDICINE

## 2023-09-14 PROCEDURE — 82105 ALPHA-FETOPROTEIN SERUM: CPT

## 2023-09-14 PROCEDURE — 25000003 PHARM REV CODE 250: Performed by: INTERNAL MEDICINE

## 2023-09-14 PROCEDURE — 99223 1ST HOSP IP/OBS HIGH 75: CPT | Mod: ,,, | Performed by: PSYCHIATRY & NEUROLOGY

## 2023-09-14 PROCEDURE — 92611 MOTION FLUOROSCOPY/SWALLOW: CPT

## 2023-09-14 PROCEDURE — 25000003 PHARM REV CODE 250: Performed by: EMERGENCY MEDICINE

## 2023-09-14 PROCEDURE — 80053 COMPREHEN METABOLIC PANEL: CPT | Performed by: INTERNAL MEDICINE

## 2023-09-14 PROCEDURE — 84100 ASSAY OF PHOSPHORUS: CPT | Performed by: INTERNAL MEDICINE

## 2023-09-14 PROCEDURE — 63600175 PHARM REV CODE 636 W HCPCS: Performed by: INTERNAL MEDICINE

## 2023-09-14 PROCEDURE — 82140 ASSAY OF AMMONIA: CPT | Performed by: INTERNAL MEDICINE

## 2023-09-14 PROCEDURE — 11000001 HC ACUTE MED/SURG PRIVATE ROOM

## 2023-09-14 PROCEDURE — 63600175 PHARM REV CODE 636 W HCPCS: Performed by: EMERGENCY MEDICINE

## 2023-09-14 PROCEDURE — 85025 COMPLETE CBC W/AUTO DIFF WBC: CPT | Performed by: INTERNAL MEDICINE

## 2023-09-14 PROCEDURE — 25500020 PHARM REV CODE 255: Performed by: INTERNAL MEDICINE

## 2023-09-14 RX ADMIN — LEVOTHYROXINE SODIUM 75 MCG: 25 TABLET ORAL at 05:09

## 2023-09-14 RX ADMIN — POTASSIUM CHLORIDE, DEXTROSE MONOHYDRATE AND SODIUM CHLORIDE: 150; 5; 450 INJECTION, SOLUTION INTRAVENOUS at 01:09

## 2023-09-14 RX ADMIN — MUPIROCIN: 20 OINTMENT TOPICAL at 08:09

## 2023-09-14 RX ADMIN — LACTULOSE 30 G: 10 SOLUTION ORAL at 08:09

## 2023-09-14 RX ADMIN — BARIUM SULFATE 10 ML: 0.81 POWDER, FOR SUSPENSION ORAL at 02:09

## 2023-09-14 RX ADMIN — ENOXAPARIN SODIUM 30 MG: 30 INJECTION SUBCUTANEOUS at 04:09

## 2023-09-14 RX ADMIN — RIFAXIMIN 550 MG: 550 TABLET ORAL at 08:09

## 2023-09-14 RX ADMIN — RIFAXIMIN 550 MG: 550 TABLET ORAL at 10:09

## 2023-09-14 RX ADMIN — LACTULOSE 30 G: 10 SOLUTION ORAL at 10:09

## 2023-09-14 RX ADMIN — LACTULOSE 30 G: 10 SOLUTION ORAL at 04:09

## 2023-09-14 RX ADMIN — MUPIROCIN: 20 OINTMENT TOPICAL at 10:09

## 2023-09-14 RX ADMIN — ONDANSETRON 4 MG: 2 INJECTION INTRAMUSCULAR; INTRAVENOUS at 05:09

## 2023-09-14 RX ADMIN — POTASSIUM CHLORIDE, DEXTROSE MONOHYDRATE AND SODIUM CHLORIDE: 150; 5; 450 INJECTION, SOLUTION INTRAVENOUS at 12:09

## 2023-09-14 NOTE — PLAN OF CARE
Patient lives with  alone in a single level house. Was walking with a walker until about 3 weeks ago. Patient has been dependent upon  for ADLS and mobility since then. Will make final discharge plan with  after neurology rounds.

## 2023-09-14 NOTE — PROGRESS NOTES
[facility] - 5th Floor Med Surg  [facility] MEDICINE ~ PROGRESS NOTE        CHIEF COMPLAINT   Hospital follow up    HOSPITAL COURSE   Tessa Dozier is a 83 y.o. White female with a past medical history of hypertension, hyperlipidemia, hypothyroidism, hepatitis-C and liver cancer status post multiple TACE procedures.  Patient was admitted to hospital medicine services 08/08/2023 to 08/14/2023 for sepsis secondary to COVID-19 infection which she tested positive on 08/07/2023.  She was treated with remdesivir and Decadron.  Patient was also noted to have NSTEMI at this time.  Since she is been having a rapid decline and function to where she is no longer able to walk or talk.  The patient presented to [facility] on 9/12/2023 with a primary complaint of altered mental status which began yesterday (09/11/2023).  Patient unable to give history and no family at bedside therefore history information gathered from chart.  Patient was seen by neurologist yesterday which EEG was performed and follow-up appointment was scheduled.  After returning home patient became less responsive and not able to speak.   Upon presentation to the ED, temperature 98.7° F, heart rate 73, blood pressure 114/63, respiratory rate 16 SpO2 99% on room air.  Labs with WBC 5.31, CO2 20, BUN/creatinine 33.6/0.77, total bilirubin 3.1, INR 1.4, PT 17.2, PTT 31.4, troponin less than 0.01, lactic acid 1.7.  Alcohol level less than 10.  Ammonia level 136.4.  UDS positive for benzos.  Influenza A and B negative. SARS-CoV-2 PCR positive.  UA negative for infection.  Chest x-ray normal sinus rhythm and right bundle-branch block.  Chest x-ray no acute chest disease.  CT of the head no acute intracranial findings.  In the ED patient received IV fluid hydration.  Patient was admitted to hospital medicine services for further medical management.    Today  Responding appropriately this morning.   at the bedside.   states that he needs to  know why she is very weak and has been declining and now to the point where she is unable to ambulate.  Also he stated that her phonation is very low compared to her baseline.  Will ask Neurology for evaluation.  Denies any recent COVID-19 contact after her recent infection about a month ago.  I am suspecting this is likely a positive test from the prior infection, waiting for Infectious Disease input.  Maybe able to discontinue isolation and save some PPE.        OBJECTIVE/PHYSICAL EXAM     VITAL SIGNS (MOST RECENT):  Temp: 98.6 °F (37 °C) (09/14/23 0735)  Pulse: 72 (09/14/23 0735)  Resp: 18 (09/14/23 0735)  BP: 136/75 (09/14/23 0735)  SpO2: 95 % (09/14/23 0735) VITAL SIGNS (24 HOUR RANGE):  Temp:  [98.3 °F (36.8 °C)-99 °F (37.2 °C)] 98.6 °F (37 °C)  Pulse:  [72-81] 72  Resp:  [18] 18  SpO2:  [95 %-98 %] 95 %  BP: (124-154)/(62-79) 136/75   GENERAL: In no acute distress, afebrile, appears generalized weakness  HEENT:  Dry mouth  CHEST: Clear to auscultation bilaterally  HEART: S1, S2, no appreciable murmur  ABDOMEN: Soft, nontender, BS +  MSK: Warm, no lower extremity edema, no clubbing or cyanosis  NEUROLOGIC: Alert and answering questions appropriately, good strength in all 4 extremities  INTEGUMENTARY:  PSYCHIATRY:        ASSESSMENT/PLAN   Acute hepatic encephalopathy   Decompensated cirrhosis   Hypernatremia-resolved   COVID-19 infection versus positive test postinfection  Chronic portal vein thrombosis    History of: HTN, HLD, hypothyroidism, hepatitis-C treated, liver cancer status post multiple TACE procedures      Will ask Infectious Disease to see if we need to continue isolation given positive COVID in August 7th.  Reinfection versus post COVID positive test.  Adjust lactulose 30 g t.i.d. to have 3 bowel movements per day  Will ask GI for evaluation as well given her 1st decompensated cirrhosis event.  Chronic portal vein thrombosis not currently on anticoagulation.  Will order for MRI brain with and  without contrast, previous 1 few months ago was abnormal.  Neurology has been consulted for input as well.   stated that she has had slow progressive decline over the last 2 3 years but more so drastically in the last few months.  No evidence of metastasis or recurrence of cancer per the , follows with hepatologist at [facility] following.  Hope to advance her diet today as she is doing much better if not then needs to start on tube feeds.  PT and OT    DVT prophylaxis:  Lovenox 30      Anticipated discharge and disposition:   __________________________________________________________________________    LABS/MICRO/MEDS/DIAGNOSTICS       LABS  Recent Labs     09/14/23  0441      K 3.7   CHLORIDE 117*   CO2 17*   BUN 25.4*   CREATININE 0.64   GLUCOSE 140*   CALCIUM 8.0*   ALKPHOS 82   AST 34   ALT 20   ALBUMIN 2.7*       Recent Labs     09/14/23  0441   WBC 8.23   RBC 3.82*   HCT 36.8*   MCV 96.3*   PLT 72*         MICROBIOLOGY  Microbiology Results (last 7 days)       ** No results found for the last 168 hours. **               MEDICATIONS   enoxparin  30 mg Subcutaneous Daily    lactulose 10 gram/15 ml  30 g Per NG tube TID    levothyroxine  75 mcg Oral Before breakfast    mupirocin   Nasal BID    rifAXIMin  550 mg Oral BID         INFUSIONS   dextrose 5 % and 0.45 % NaCl with KCl 20 mEq 100 mL/hr at 09/14/23 0123          DIAGNOSTIC TESTS  US Abdomen Complete with Doppler (xpd)   Final Result      Occluded main portal vein, also seen on prior exams.         Electronically signed by: Desiree Zepeda   Date:    09/13/2023   Time:    17:17      XR Gastric tube check, non-radiologist performed   Final Result      As above.         Electronically signed by: Casimiro Tirado   Date:    09/12/2023   Time:    16:33      CT Head Without Contrast   Final Result      No acute intracranial findings.         Electronically signed by: Casimiro Tirado   Date:    09/12/2023   Time:    11:49      X-Ray Chest AP  "Portable   Final Result      No acute chest disease is identified.         Electronically signed by: Parminder Beverly   Date:    09/12/2023   Time:    12:16      MRI Brain W WO Contrast    (Results Pending)        No results found for: "EF"       NUTRITION STATUS  Patient meets ASPEN criteria for   malnutrition of acute illness or injury per RD assessment as evidenced by:  Energy Intake (Malnutrition): less than 75% for greater than 7 days  Weight Loss (Malnutrition): greater than 2% in 1 week                 A minimum of two characteristics is recommended for diagnosis of either severe or non-severe malnutrition.       Case related differential diagnoses have been reviewed; assessment and plan has been documented. I have personally reviewed the labs and test results that are currently available; I have reviewed the patients medication list. I have reviewed the consulting providers recommendations. I have reviewed or attempted to review medical records based upon their availability.  All of the patient's and/or family's questions have been addressed and answered to the best of my ability.  I will continue to monitor closely and make adjustments to medical management as needed.  This document was created using M*Modal Fluency Direct.  Transcription errors may have been made.  Please contact me if any questions may rise regarding documentation to clarify transcription.        Jasmeet Rush MD   Internal Medicine  Department of Hospital Medicine  [facility] - 5th Floor Med Surg               "

## 2023-09-14 NOTE — PLAN OF CARE
09/14/23 1345   Discharge Assessment   Assessment Type Discharge Planning Assessment   Confirmed Demographics Correct on Facesheet   Source of Information family   Communicated BRITTNEY with patient/caregiver Date not available/Unable to determine   Reason For Admission AMS hepatic encephalopathy   People in Home spouse   Do you expect to return to your current living situation? Other (see comments)  (TBD)   Do you have help at home or someone to help you manage your care at home? Yes   Who are your caregiver(s) and their phone number(s)? spouse Rodriguez Dozier [phone]   Walking or Climbing Stairs ambulation difficulty, dependent;stair climbing difficulty, dependent;transferring difficulty, dependent   Mobility Management has been dependent last 3 weeks   Dressing/Bathing bathing difficulty, dependent;dressing difficulty, dependent   Dressing/Bathing Management has been dependnet last 3 weeks   Do you have any problems with: Needs other help   Home Accessibility wheelchair accessible   Home Layout Able to live on 1st floor   Equipment Currently Used at Home none   Readmission within 30 days? Yes   Patient currently being followed by outpatient case management? No   Do you currently have service(s) that help you manage your care at home? No   Do you take prescription medications? Yes   Do you have prescription coverage? Yes   Coverage Medicare A&B   Do you have any problems affording any of your prescribed medications? No   Is the patient taking medications as prescribed? yes   How do you get to doctors appointments? family or friend will provide   Are you on dialysis? No   Do you take coumadin? No   DME Needed Upon Discharge  other (see comments)  (TBD)   Discharge Plan discussed with: Spouse/sig other   Name(s) and Number(s) Rodriguez Candelariod [phone]   Transition of Care Barriers Mobility   Discharge Plan A Rehab   Discharge Plan B Skilled Nursing Facility   OTHER   Name(s) of People in Home Rodriguez Dozier  [phone]

## 2023-09-14 NOTE — CONSULTS
"[facility] - 5th Floor Med Surg  Neurology  Consult Note    Patient Name: Tessa Dozier  MRN: [id number]  Admission Date: 9/12/2023  Hospital Length of Stay: 2 days  Code Status: Full Code   Attending Provider: Ezra Ponce MD   Consulting Provider: Adore Reynolds [facility]  Primary Care Physician: Angélica Dominguez MD  Principal Problem:<principal problem not specified>    Inpatient consult to Neurology  Consult performed by: Adore Reynolsd FNP  Consult ordered by: Jasmeet Rush MD         Subjective:     Chief Complaint:       HPI:   83-year-old female with PMH HTN, HLD, hypothyroidism, hepatitis-C, and liver cancer s/p multiple TACE procedures who presented to ED on 9/12 with AMS that began the day PTA.  Patient reportedly seen by neurologist the day PTA, which EEG was performed with plans to follow-up outpatient.  However, upon returning home patient became less responsive and not able to speak was brought to ED for further evaluation.      CT head without on admission unrevealing for acute intracranial abnormalities.  Labs significant for BUN 25 0.4 bicarb 17, COVID positive, otherwise unremarkable.       Past Medical History:   Diagnosis Date    Hypercholesteremia     Hypertension     Liver cancer     Thyroid disease        Past Surgical History:   Procedure Laterality Date    ear lobe surgery      EYE SURGERY  2018       Review of patient's allergies indicates:   Allergen Reactions    Promethazine      Other reaction(s): hallucinates    Azo-sulfisoxazole      Other reaction(s): rash    Ciprofloxacin      Other reaction(s): hives    Linaclotide      Other reaction(s): "feels awful"       Current Neurological Medications:     No current facility-administered medications on file prior to encounter.     Current Outpatient Medications on File Prior to Encounter   Medication Sig    ALPRAZolam (XANAX) 2 MG Tab Take 1 tablet (2 mg total) by mouth every 24 hours as needed (anxiety).    carvediloL " (COREG) 3.125 MG tablet TAKE 1 TABLET BY MOUTH TWICE A DAY    hydrocortisone (ANUSOL-HC) 2.5 % rectal cream Place rectally 2 (two) times daily as needed for Hemorrhoids.    levothyroxine (SYNTHROID) 75 MCG tablet Take 1 tablet (75 mcg total) by mouth before breakfast.    mupirocin (BACTROBAN) 2 % ointment Apply topically 3 (three) times daily.    pravastatin (PRAVACHOL) 40 MG tablet Take 40 mg by mouth once daily.     Family History    None       Tobacco Use    Smoking status: Never     Passive exposure: Never    Smokeless tobacco: Never   Substance and Sexual Activity    Alcohol use: Never    Drug use: Never    Sexual activity: Not Currently     Review of Systems   Unable to perform ROS: Acuity of condition       Objective:     Vital Signs (Most Recent):  Temp: 98.4 °F (36.9 °C) (09/14/23 1107)  Pulse: 72 (09/14/23 1107)  Resp: 18 (09/14/23 1107)  BP: 126/65 (09/14/23 1107)  SpO2: 97 % (09/14/23 1107) Vital Signs (24h Range):  Temp:  [98.3 °F (36.8 °C)-99 °F (37.2 °C)] 98.4 °F (36.9 °C)  Pulse:  [72-81] 72  Resp:  [18] 18  SpO2:  [95 %-97 %] 97 %  BP: (124-136)/(62-75) 126/65     Weight: 45.9 kg (101 lb 3.1 oz)  Body mass index is 17.36 kg/m².     Physical Exam   Full exam to follow per neurologist       Significant Labs:   Recent Lab Results         09/14/23  0441        Albumin/Globulin Ratio 0.9       Albumin 2.7       Alkaline Phosphatase 82       ALT 20       Ammonia 65.3       AST 34       Baso # 0.06       Basophil % 0.7       BILIRUBIN TOTAL 2.9       BUN 25.4       Calcium 8.0       Chloride 117       CO2 17       Creatinine 0.64       eGFR >60       Eos # 0.10       Eosinophil % 1.2       Globulin, Total 2.9       Glucose 140       Hematocrit 36.8       Hemoglobin 12.6       Immature Grans (Abs) 0.04       Immature Granulocytes 0.5       INR 1.6       Lymph # 1.19       LYMPH % 14.5       MCH 33.0       MCHC 34.2       MCV 96.3       Mono # 1.21       Mono % 14.7       MPV 10.7       Neut # 5.63        Neut % 68.4       nRBC 0.0       Phosphorus 2.0       Platelets 72       Potassium 3.7       PROTEIN TOTAL 5.6       Protime 19.0       RBC 3.82       RDW 17.0       Sodium 141       WBC 8.23               Significant Imaging:   CT head w/o 9/12/2023:     FINDINGS:  There is no acute cortical infarct, hemorrhage or mass lesion.  No new parenchymal attenuation abnormality.  Ventricular size is stable.  There are vascular calcifications.     Visualized paranasal sinuses and mastoid air cells are clear.     Impression:     No acute intracranial findings.       I have reviewed all pertinent imaging results/findings within the past 24 hours.    Assessment and Plan:     Altered mental status  Multifactorial    -MRI brain w w/o ordered  -PT/OT  -consider palliative care        VTE Risk Mitigation (From admission, onward)           Ordered     enoxaparin injection 30 mg  Daily         09/12/23 1556     IP VTE HIGH RISK PATIENT  Once         09/12/23 1556     Place sequential compression device  Until discontinued         09/12/23 1556     Place sequential compression device  Until discontinued         09/12/23 1534                    Thank you for your consult.  Further recommendations to follow per MD Adore Reynolds, [facility]  Inpatient Neurology  [facility] - 5th Floor Med Surg    I have seen/examined the patient.  NP was scribe.  I agree with the findings unless outlined below:    Adam N Foreman, MD [facility]       In brief, ~6 mos of generalized decline, but worse over the last 2-3  Intermittently confused; had danny hepatic encephalopathy 3 days ago; imrpving, but not to baseline  Largely unable to walk or care for self    Reviewed mri brain from June 2023; it has a metabolic appearance to it, most likely 2.2 liver dz in this case  I cannot exclude underlying alzheimer patholoyg on a pathological level, but we will never know; I think the metabolic derangemetns are winning    Discussed  current state of affairs;  would like referral to [facility] to discuss his options

## 2023-09-14 NOTE — SUBJECTIVE & OBJECTIVE
"Past Medical History:   Diagnosis Date    Hypercholesteremia     Hypertension     Liver cancer     Thyroid disease        Past Surgical History:   Procedure Laterality Date    ear lobe surgery      EYE SURGERY  2018       Review of patient's allergies indicates:   Allergen Reactions    Promethazine      Other reaction(s): hallucinates    Azo-sulfisoxazole      Other reaction(s): rash    Ciprofloxacin      Other reaction(s): hives    Linaclotide      Other reaction(s): "feels awful"       Current Neurological Medications:     No current facility-administered medications on file prior to encounter.     Current Outpatient Medications on File Prior to Encounter   Medication Sig    ALPRAZolam (XANAX) 2 MG Tab Take 1 tablet (2 mg total) by mouth every 24 hours as needed (anxiety).    carvediloL (COREG) 3.125 MG tablet TAKE 1 TABLET BY MOUTH TWICE A DAY    hydrocortisone (ANUSOL-HC) 2.5 % rectal cream Place rectally 2 (two) times daily as needed for Hemorrhoids.    levothyroxine (SYNTHROID) 75 MCG tablet Take 1 tablet (75 mcg total) by mouth before breakfast.    mupirocin (BACTROBAN) 2 % ointment Apply topically 3 (three) times daily.    pravastatin (PRAVACHOL) 40 MG tablet Take 40 mg by mouth once daily.     Family History    None       Tobacco Use    Smoking status: Never     Passive exposure: Never    Smokeless tobacco: Never   Substance and Sexual Activity    Alcohol use: Never    Drug use: Never    Sexual activity: Not Currently     Review of Systems   Unable to perform ROS: Acuity of condition       Objective:     Vital Signs (Most Recent):  Temp: 98.4 °F (36.9 °C) (09/14/23 1107)  Pulse: 72 (09/14/23 1107)  Resp: 18 (09/14/23 1107)  BP: 126/65 (09/14/23 1107)  SpO2: 97 % (09/14/23 1107) Vital Signs (24h Range):  Temp:  [98.3 °F (36.8 °C)-99 °F (37.2 °C)] 98.4 °F (36.9 °C)  Pulse:  [72-81] 72  Resp:  [18] 18  SpO2:  [95 %-97 %] 97 %  BP: (124-136)/(62-75) 126/65     Weight: 45.9 kg (101 lb 3.1 oz)  Body mass index is " 17.36 kg/m².     Physical Exam   Full exam to follow per neurologist       Significant Labs:   Recent Lab Results         09/14/23  0441        Albumin/Globulin Ratio 0.9       Albumin 2.7       Alkaline Phosphatase 82       ALT 20       Ammonia 65.3       AST 34       Baso # 0.06       Basophil % 0.7       BILIRUBIN TOTAL 2.9       BUN 25.4       Calcium 8.0       Chloride 117       CO2 17       Creatinine 0.64       eGFR >60       Eos # 0.10       Eosinophil % 1.2       Globulin, Total 2.9       Glucose 140       Hematocrit 36.8       Hemoglobin 12.6       Immature Grans (Abs) 0.04       Immature Granulocytes 0.5       INR 1.6       Lymph # 1.19       LYMPH % 14.5       MCH 33.0       MCHC 34.2       MCV 96.3       Mono # 1.21       Mono % 14.7       MPV 10.7       Neut # 5.63       Neut % 68.4       nRBC 0.0       Phosphorus 2.0       Platelets 72       Potassium 3.7       PROTEIN TOTAL 5.6       Protime 19.0       RBC 3.82       RDW 17.0       Sodium 141       WBC 8.23               Significant Imaging:   CT head w/o 9/12/2023:     FINDINGS:  There is no acute cortical infarct, hemorrhage or mass lesion.  No new parenchymal attenuation abnormality.  Ventricular size is stable.  There are vascular calcifications.     Visualized paranasal sinuses and mastoid air cells are clear.     Impression:     No acute intracranial findings.       I have reviewed all pertinent imaging results/findings within the past 24 hours.

## 2023-09-14 NOTE — NURSING
CONSULTED INFECTION PREVENTION. OK TO DC PRECAUTIONS. PT INITIALLY TESTED POSITIVE FOR COVID ON 8/12/23. NOTIFIED MD

## 2023-09-14 NOTE — PROCEDURES
"[facility]  Speech Language Pathology Department  Modified Barium Swallow (MBS) Study    Patient Name:  Tessa Dozier   MRN:  [id number]    Recommendations:     General recommendations:  dysphagia therapy  Repeat MBS study: 5-7 days  Diet texture/consistency recommendations:  NPO  Medications: via NG tube  General precautions: Standard, aspiration    History/Reason for Referral:     Tessa Dozier is a/n 83 y.o. female admitted with AMS.  Pt was seen by a neurologist on Monday for encephalopathy.  CT of the head upon admission was negative for acute changes.  Pt with recent Covid infection requiring hospitalization.  Pt seen by neurology who recommended to consider palliative care.     Past Medical History:   Diagnosis Date    Hypercholesteremia     Hypertension     Liver cancer     Thyroid disease      Past Surgical History:   Procedure Laterality Date    ear lobe surgery      EYE SURGERY  2018     A MBS Study was completed to assess the efficiency of her swallow function, rule out aspiration and make recommendations regarding safe dietary consistencies, effective compensatory strategies, and safe eating environment.     Home Diet: Regular and thin liquids (per )  Current Method of Nutrition: NPO     Patient complaint: Pt's  reports no difficulty with PO intake at home and she "loves that boost"    Imaging   Results for orders placed during the hospital encounter of 08/08/23    X-Ray Chest 1 View    Narrative  EXAMINATION:  XR CHEST 1 VIEW    CLINICAL HISTORY:  Shortness of breath    COMPARISON:  08/07/2023    FINDINGS:  Single view of the chest shows new small left pleural fluid.  No pneumothorax or lobar type consolidation.  Aorta is partially calcified.  Heart is upper normal in size.    Impression  Small left pleural effusion, new from the prior study      Electronically signed by: Nik Miguel MD  Date:    08/08/2023  Time:    12:40    Subjective:     Patient " awake, alert, and cooperative.    Spiritual/Cultural/Buddhism Beliefs/Practices that affect care: no  Pain/Comfort: Pain Rating 1: 0/10    Respiratory Status: room air  Restraints/positioning devices: none    Fluoroscopic Results:     Oral Musculature  Dentition: own teeth  Secretion Management: coughing on secretions  Mucosal Quality: good  Facial Movement: masked facies  Buccal Strength & Mobility: impaired  Mandibular Strength & Mobility: WFL  Oral Labial Strength & Mobility: impaired retraction and impaired pursing  Lingual Strength & Mobility: impaired strength  Velar Elevation: WFL  Vocal Quality: strained/strangled  Volitional Cough: Nonproductive    Positioning: upright in bed  Visualization  Patient was seen in the lateral view  NG tube observed in place    Oral Phase:   Reduced lip closure  Reduced lip closure around utensil/straw  Bolus holding  Prolonged/disorganized bolus formation  Disorganized lingual movement  Adequate anterior-posterior transport  Tongue pumping  Poor bolus cohesion  Loss of bolus control with liquids    Pharyngeal Phase:   Swallow delay with spill to the pyriform sinuses  Reduced base of tongue retraction  Adequate epiglottic deflection  Reduced hyolaryngeal excursion  Poor airway protection  Consistency Laryngeal Penetration Aspiration Residue   Mildly thick liquid by spoon Before the swallow After the swallow  SILENT  Cued cough NOT productive None   Puree None None None   Mildly thick liquid by straw Before the swallow During the swallow  SILENT  Cued cough NOT productive Mild  Pyriform sinus   Moderately thick liquid by straw During the swallow During the swallow  SILENT  Cued cough NOT productive Mild  Valleculae and Pyriform sinus     Cervical Esophageal Phase:   UES appeared to accommodate all bolus types without stasis or retrograde movement visualized    Compensatory Strategies:  Compensatory strategies were not attempted due to cognitive impairments    Additional  Comments:  Thin liquids were not tested due to severity of impairment    Assessment:     Pt exhibited severe oropharyngeal dysphagia characterized by the findings noted above.  SILENT aspiration of all liquids give.  Cued cough was ineffective for clearing contrast material from the trachea.  Both swallow safety and efficiency are impaired.  Swallow function is negatively impacted by fatigue.    Patient appears to be at high risk for aspiration related pneumonia when considering severity of dysphagia, complicated medical status, poor airway closure, poor saliva management/need for suctioning, reduced mobility, cognitive impairments, and weak/ineffective cough.  Prognosis for behavioral swallow rehabilitation is fair.    Goals:     Multidisciplinary Problems       SLP Goals          Problem: SLP    Goal Priority Disciplines Outcome   SLP Goal     SLP Ongoing, Progressing   Description:   LTG: Tolerate least restrictive PO diet with no clinical signs/sx aspiration  STGs:  1.  Complete base of tongue and laryngeal strengthening exercises with minimal-moderate cues  2.  Tolerate thermal stimulation to the anterior faucial pillars with 100% effortful swallow responses and delay less than 2 seconds.  3.  Tolerate 2 oz of puree solids with adequate bolus formation and no clinical signs/sx aspiration                       Patient Education:     No learner present/available.    Plan:     SLP Follow-Up:  Yes    Patient to be seen:  daily   Plan of Care expires:  09/21/23  Plan of Care reviewed with:  patient     Time Tracking:     SLP Treatment Date:   09/14/23  Speech Start Time:  1340  Speech Stop Time:  1400     Speech Total Time (min):  20 min    Billable minutes:   Motion Fluoroscopic Evaluation, Video Recording, 20 minutes     09/14/2023

## 2023-09-14 NOTE — HPI
83-year-old female with PMH HTN, HLD, hypothyroidism, hepatitis-C, and liver cancer s/p multiple TACE procedures who presented to ED on 9/12 with AMS that began the day PTA.  Patient reportedly seen by neurologist the day PTA, which EEG was performed with plans to follow-up outpatient.  However, upon returning home patient became less responsive and not able to speak was brought to ED for further evaluation.      CT head without on admission unrevealing for acute intracranial abnormalities.  Labs significant for BUN 25 0.4 bicarb 17, COVID positive, otherwise unremarkable.

## 2023-09-14 NOTE — PT/OT/SLP EVAL
"Occupational Therapy  Evaluation    Name: Tessa Dozier  MRN: [id number]  Admitting Diagnosis: acute hepatic encephalopathy  Recent Surgery: * No surgery found *      Recommendations:     Discharge Recommendations: nursing facility, skilled  Discharge Equipment Recommendations:   (pending progress)  Barriers to discharge:       Assessment:     Tessa Dozier is a 83 y.o. female with a medical diagnosis of acute hepatic encephalopathy, stemi, sepsis due to covid.  She presents pleasant but confused, slightly drowsy.  Family in room report steady decline.  Pt still on precautions and is pending MRI of brain.  Pending neuro and ID consults.  She presents with the following performance deficits affecting function: weakness, impaired endurance, impaired self care skills, impaired functional mobility, gait instability, impaired cognition, decreased lower extremity function, decreased upper extremity function.  Upon discharge, this patient would benefit from SNF.  Chart indicates possible palliative.  Will continue to follow.    Rehab Prognosis: Fair; patient would benefit from acute skilled OT services to address these deficits and reach maximum level of function.       Plan:     Patient to be seen 3 x/week to address the above listed problems via self-care/home management  Plan of Care Expires:    Plan of Care Reviewed with: patient (daughter in law)    Subjective     Chief Complaint: no complaints  Patient/Family Comments/goals: "for her to get better" daughter in law goal    Occupational Profile:  Living Environment: lives with  in a townhouse, son reports they do not have to manage stairs, walk in tub  Previous level of function: independent until covid approx 4 weeks ago, steady decline in function  Roles and Routines: mother, mother in law, wife  Equipment Used at Home:  (w/c lately)  Assistance upon Discharge: family    Pain/Comfort:  Pain Rating 1:  (wincing, grabbing stomach/abdomen, then OT noted pt " to have a large BM)    Patients cultural, spiritual, Anabaptist conflicts given the current situation:      Objective:     OT communicated with RN prior to session.      Patient was found right sidelying with  (NG) upon OT entry to room.    General Precautions: Standard, droplet (contact, covid)  Orthopedic Precautions: N/A  Braces: N/A      Bed Mobility:    Patient completed Supine to Sit with total assistance  Patient completed Sit to Supine with total assistance  Frail, profound weakness    Functional Mobility/Transfers:  Functional Mobility: Able to take 4 side steps, difficulty with coordinating forward steps    Activities of Daily Living:  Feeding:  does not occur NG  Lower Body Dressing: total assistance socks  Toileting: total assistance +BM hygiene    AMPA 6 Click ADL:  AMPAC Total Score: 11    Functional Cognition:  Able to state name, not oriented to birthday/year/place  Smiled, pleasant but with significant cog deficits  Followed simple 1 step commands with MOD cues 1/3 occasions    Visual Perceptual Skills:  Head/neck held in extension but pt able to actively rotate on command, stiffness present    Upper Extremity Function:  Right Upper Extremity:   3-/5  Left Upper Extremity:  3-/5  Weakness and debility present     Balance:   Static sitting balance: min A    Therapeutic Positioning  Risk for acquired pressure injuries is increased due to relative decrease in mobility d/t hospitalization , impaired mobility, incontinence, inability to communicate toileting needs, and decreased level of consciousness .    OT interventions performed during the course of today's session in an effort to prevent and/or reduce acquired pressure injuries:   Therapeutic positioning completed     Skin assessment: all bony prominences were assessed    Findings:  L forearm abrasions near IV; no breakdown otherwise noted on bony prominences    OT recommendations for therapeutic positioning throughout hospitalization:   Follow  [facility] Pressure Injury Prevention Protocol  Geomat recommended for sacral protection while UIC      Patient Education:  Patient provided with verbal education education regarding OT role/goals/POC, fall prevention, and safety awareness.  Additional teaching is warranted.     Patient left right sidelying with all lines intact, call button in reach, and daughter in law entering room    GOALS:   Multidisciplinary Problems       Occupational Therapy Goals          Problem: Occupational Therapy    Goal Priority Disciplines Outcome Interventions   Occupational Therapy Goal     OT, PT/OT Ongoing, Progressing    Description: Goals to be met by:  10/14/23  Patient will increase functional independence with ADLs by performing:    UE Dressing with Minimal Assistance.  Grooming while seated at sink with Minimal Assistance.  Toileting from bedside commode with Minimal Assistance for hygiene and clothing management.   Toilet transfer to bedside commode with Minimal Assistance.                         History:     Past Medical History:   Diagnosis Date    Hypercholesteremia     Hypertension     Liver cancer     Thyroid disease          Past Surgical History:   Procedure Laterality Date    ear lobe surgery      EYE SURGERY  2018       Time Tracking:     OT Date of Treatment:    OT Start Time: 1058  OT Stop Time: 1125  OT Total Time (min): 27 min    Billable Minutes:Evaluation MOD    9/14/2023

## 2023-09-14 NOTE — PT/OT/SLP PROGRESS
Physical Therapy Treatment    Patient Name:  Tessa Dozier   MRN:  [id number]    Two attempts to see patient today. First attempt, patient out of room for MBS. Second attempt, patient asked for PT to return in morning. PT to f/u tomorrow.

## 2023-09-14 NOTE — PLAN OF CARE
Problem: Occupational Therapy  Goal: Occupational Therapy Goal  Description: Goals to be met by:  10/14/23  Patient will increase functional independence with ADLs by performing:    UE Dressing with Minimal Assistance.  Grooming while seated at sink with Minimal Assistance.  Toileting from bedside commode with Minimal Assistance for hygiene and clothing management.   Toilet transfer to bedside commode with Minimal Assistance.    Outcome: Ongoing, Progressing

## 2023-09-14 NOTE — CONSULTS
Inpatient Nutrition Assessment    Admit Date: 9/12/2023   Total duration of encounter: 2 days     Nutrition Recommendation/Prescription     -Oral diet per SLP recommendations. Currently NPO.     -Start tube feeds through NG at 25ml/hr and advance by 10ml q4hr as tolerated to goal rate.  Fibersource HN @ 55ml/hr + 50ml water flush q2hr will provide:   1320kcals (95% est needs)  59g protein (107% est needs)  1391ml free water (101% est needs)    Communication of Recommendations: reviewed with nurse    Nutrition Assessment     Malnutrition Assessment/Nutrition-Focused Physical Exam    Malnutrition Context: acute illness or injury  Malnutrition Level: moderate  Energy Intake (Malnutrition): less than 75% for greater than 7 days  Weight Loss (Malnutrition): greater than 2% in 1 week  Subcutaneous Fat (Malnutrition):  (unable to evaluate)           Muscle Mass (Malnutrition):  (unable to evaluate)                          Fluid Accumulation (Malnutrition):  (does not meet criteria)        A minimum of two characteristics is recommended for diagnosis of either severe or non-severe malnutrition.    Chart Review    Reason Seen: malnutrition screening tool (MST) and physician consult for tube feeds    Malnutrition Screening Tool Results   Have you recently lost weight without trying?: Unsure  Have you been eating poorly because of a decreased appetite?: Yes   MST Score: 3     Diagnosis:  Acute hepatic encephalopathy   Decompensated cirrhosis   Hypernatremia   COVID-19 infection versus positive test postinfection    Relevant Medical History: HTN, HLD, hypothyroidism, hepatitis-C treated, liver cancer status post multiple TACE procedures    Nutrition-Related Medications: D5 + 1/2NS + KCL @ 100ml/hr, lactulose TID, levothyroxine  Calorie Containing IV Medications: no significant kcals from medications at this time    Nutrition-Related Labs:  9/14/23 Cl 117, CO2 17, BUN 25.4, Gluc 140, Ca 8, Protein total 5.6, Alb 2.7    Diet/PN  "Order: Diet NPO  Oral Supplement Order: none  Tube Feeding Order: none  Appetite/Oral Intake: NPO/NPO  Factors Affecting Nutritional Intake: NPO  Food/Christian/Cultural Preferences: none reported  Food Allergies: none reported       Wound(s):       Comments    23 Consult received to start tube feeds, pt NPO. Has NG for meds. Pt with decline over the last month since falling sick with COVID, noted 4-6% wt loss. Ordered placed for tube feeds and discussed with RN. NFPA on follow-up.     Anthropometrics    Height: 5' 4.02" (162.6 cm) Height Method: Estimated  Last Weight: 45.9 kg (101 lb 3.1 oz) (23 0753) Weight Method: Estimated (documented 23 in EMR)  BMI (Calculated): 17.4  BMI Classification: underweight (BMI less than 18.5)     Ideal Body Weight (IBW), Female: 120.1 lb     % Ideal Body Weight, Female (lb): 84.25 %                    Usual Body Weight (UBW), k.9 kg  % Usual Body Weight: 96.03     Usual Weight Provided By: EMR weight history    Wt Readings from Last 15 Encounters:   23 45.9 kg (101 lb 3.1 oz)   23 47.9 kg (105 lb 9.6 oz)   23 49 kg (108 lb)   23 49.7 kg (109 lb 9.1 oz)   23 49.2 kg (108 lb 7.5 oz)   23 50.3 kg (110 lb 14.4 oz)   23 49.2 kg (108 lb 8 oz)   23 50.2 kg (110 lb 11.2 oz)   23 48.5 kg (107 lb)   22 48 kg (105 lb 12.8 oz)   10/12/21 51 kg (112 lb 5.2 oz)     Weight Change(s) Since Admission:  Admit Weight: 45.9 kg (101 lb 3.1 oz) (23 0128)  23 45.9kg admit wt, 4.2% wt loss since 23.     Estimated Needs    Weight Used For Calorie Calculations: 45.9 kg (101 lb 3.1 oz)  Energy Calorie Requirements (kcal): 1377kcals/d (30kcals/kg)  Energy Need Method: Kcal/kg  Weight Used For Protein Calculations: 45.9 kg (101 lb 3.1 oz)  Protein Requirements: 55g/d (1.2g/kg)  Fluid Requirements (mL): 1377ml fl/d (1ml/kcal)  Temp (24hrs), Av.7 °F (37.1 °C), Min:98.3 °F (36.8 °C), Max:99 °F (37.2 °C)   "     Enteral Nutrition    Patient not receiving enteral nutrition at this time.    Parenteral Nutrition    Patient not receiving parenteral nutrition support at this time.    Evaluation of Received Nutrient Intake    Calories: not meeting estimated needs  Protein: not meeting estimated needs    Patient Education    Not applicable.    Nutrition Diagnosis     PES: Inadequate oral intake related to acute illness as evidenced by NPO since admit 9/12/23. (new)    Interventions/Goals     Intervention(s): modified composition of enteral nutrition, modified rate of enteral nutrition, and collaboration with other providers  Goal: Meet greater than 75% of nutritional needs by follow-up. (new)    Monitoring & Evaluation     Dietitian will monitor energy intake, weight change, electrolyte/renal panel, and glucose/endocrine profile.  Nutrition Risk/Follow-Up: moderate (follow-up in 3-5 days)   Please consult if re-assessment needed sooner.

## 2023-09-15 VITALS
DIASTOLIC BLOOD PRESSURE: 67 MMHG | WEIGHT: 101.19 LBS | BODY MASS INDEX: 17.28 KG/M2 | TEMPERATURE: 99 F | SYSTOLIC BLOOD PRESSURE: 148 MMHG | HEART RATE: 78 BPM | OXYGEN SATURATION: 96 % | HEIGHT: 64 IN | RESPIRATION RATE: 18 BRPM

## 2023-09-15 LAB
ALBUMIN SERPL-MCNC: 2.5 G/DL (ref 3.4–4.8)
ALBUMIN/GLOB SERPL: 0.8 RATIO (ref 1.1–2)
ALP SERPL-CCNC: 77 UNIT/L (ref 40–150)
ALT SERPL-CCNC: 16 UNIT/L (ref 0–55)
AMMONIA PLAS-MSCNC: 71.7 UMOL/L (ref 18–72)
AST SERPL-CCNC: 25 UNIT/L (ref 5–34)
BASOPHILS # BLD AUTO: 0.03 X10(3)/MCL
BASOPHILS NFR BLD AUTO: 0.3 %
BILIRUB SERPL-MCNC: 2.5 MG/DL
BUN SERPL-MCNC: 18.8 MG/DL (ref 9.8–20.1)
CALCIUM SERPL-MCNC: 8 MG/DL (ref 8.4–10.2)
CHLORIDE SERPL-SCNC: 113 MMOL/L (ref 98–107)
CO2 SERPL-SCNC: 21 MMOL/L (ref 23–31)
CREAT SERPL-MCNC: 0.67 MG/DL (ref 0.55–1.02)
EOSINOPHIL # BLD AUTO: 0.06 X10(3)/MCL (ref 0–0.9)
EOSINOPHIL NFR BLD AUTO: 0.7 %
ERYTHROCYTE [DISTWIDTH] IN BLOOD BY AUTOMATED COUNT: 16.7 % (ref 11.5–17)
GFR SERPLBLD CREATININE-BSD FMLA CKD-EPI: >60 MLS/MIN/1.73/M2
GLOBULIN SER-MCNC: 3.1 GM/DL (ref 2.4–3.5)
GLUCOSE SERPL-MCNC: 148 MG/DL (ref 82–115)
HCT VFR BLD AUTO: 34.3 % (ref 37–47)
HGB BLD-MCNC: 12 G/DL (ref 12–16)
IMM GRANULOCYTES # BLD AUTO: 0.05 X10(3)/MCL (ref 0–0.04)
IMM GRANULOCYTES NFR BLD AUTO: 0.6 %
LYMPHOCYTES # BLD AUTO: 1.26 X10(3)/MCL (ref 0.6–4.6)
LYMPHOCYTES NFR BLD AUTO: 13.9 %
MCH RBC QN AUTO: 33.6 PG (ref 27–31)
MCHC RBC AUTO-ENTMCNC: 35 G/DL (ref 33–36)
MCV RBC AUTO: 96.1 FL (ref 80–94)
MONOCYTES # BLD AUTO: 1.22 X10(3)/MCL (ref 0.1–1.3)
MONOCYTES NFR BLD AUTO: 13.5 %
NEUTROPHILS # BLD AUTO: 6.43 X10(3)/MCL (ref 2.1–9.2)
NEUTROPHILS NFR BLD AUTO: 71 %
NRBC BLD AUTO-RTO: 0 %
PLATELET # BLD AUTO: 68 X10(3)/MCL (ref 130–400)
PMV BLD AUTO: 10.9 FL (ref 7.4–10.4)
POTASSIUM SERPL-SCNC: 3.6 MMOL/L (ref 3.5–5.1)
PROT SERPL-MCNC: 5.6 GM/DL (ref 5.8–7.6)
RBC # BLD AUTO: 3.57 X10(6)/MCL (ref 4.2–5.4)
SODIUM SERPL-SCNC: 138 MMOL/L (ref 136–145)
WBC # SPEC AUTO: 9.05 X10(3)/MCL (ref 4.5–11.5)

## 2023-09-15 PROCEDURE — 82140 ASSAY OF AMMONIA: CPT

## 2023-09-15 PROCEDURE — 80053 COMPREHEN METABOLIC PANEL: CPT | Performed by: INTERNAL MEDICINE

## 2023-09-15 PROCEDURE — 25000003 PHARM REV CODE 250: Performed by: INTERNAL MEDICINE

## 2023-09-15 PROCEDURE — 97162 PT EVAL MOD COMPLEX 30 MIN: CPT

## 2023-09-15 PROCEDURE — 85025 COMPLETE CBC W/AUTO DIFF WBC: CPT | Performed by: INTERNAL MEDICINE

## 2023-09-15 PROCEDURE — 63600175 PHARM REV CODE 636 W HCPCS: Performed by: EMERGENCY MEDICINE

## 2023-09-15 PROCEDURE — 63600175 PHARM REV CODE 636 W HCPCS: Performed by: INTERNAL MEDICINE

## 2023-09-15 RX ORDER — LACTULOSE 10 G/15ML
30 SOLUTION ORAL DAILY
Status: DISCONTINUED | OUTPATIENT
Start: 2023-09-15 | End: 2023-09-15 | Stop reason: HOSPADM

## 2023-09-15 RX ORDER — LACTULOSE 10 G/15ML
20 SOLUTION ORAL DAILY
Qty: 900 ML | Refills: 0 | Status: SHIPPED | OUTPATIENT
Start: 2023-09-16 | End: 2023-10-16

## 2023-09-15 RX ADMIN — LACTULOSE 30 G: 10 SOLUTION ORAL at 08:09

## 2023-09-15 RX ADMIN — POTASSIUM CHLORIDE, DEXTROSE MONOHYDRATE AND SODIUM CHLORIDE: 150; 5; 450 INJECTION, SOLUTION INTRAVENOUS at 12:09

## 2023-09-15 RX ADMIN — LEVOTHYROXINE SODIUM 75 MCG: 25 TABLET ORAL at 06:09

## 2023-09-15 RX ADMIN — RIFAXIMIN 550 MG: 550 TABLET ORAL at 08:09

## 2023-09-15 RX ADMIN — ONDANSETRON 4 MG: 2 INJECTION INTRAMUSCULAR; INTRAVENOUS at 09:09

## 2023-09-15 NOTE — PROGRESS NOTES
[facility] - 5th Floor Med Surg  [facility] MEDICINE ~ PROGRESS NOTE        CHIEF COMPLAINT   Hospital follow up    HOSPITAL COURSE   Tessa Dozier is a 83 y.o. White female with a past medical history of hypertension, hyperlipidemia, hypothyroidism, hepatitis-C and liver cancer status post multiple TACE procedures.  Patient was admitted to hospital medicine services 08/08/2023 to 08/14/2023 for sepsis secondary to COVID-19 infection which she tested positive on 08/07/2023.  She was treated with remdesivir and Decadron.  Patient was also noted to have NSTEMI at this time.  Since she is been having a rapid decline and function to where she is no longer able to walk or talk.  The patient presented to [facility] on 9/12/2023 with a primary complaint of altered mental status which began yesterday (09/11/2023).  Patient unable to give history and no family at bedside therefore history information gathered from chart.  Patient was seen by neurologist yesterday which EEG was performed and follow-up appointment was scheduled.  After returning home patient became less responsive and not able to speak.   Upon presentation to the ED, temperature 98.7° F, heart rate 73, blood pressure 114/63, respiratory rate 16 SpO2 99% on room air.  Labs with WBC 5.31, CO2 20, BUN/creatinine 33.6/0.77, total bilirubin 3.1, INR 1.4, PT 17.2, PTT 31.4, troponin less than 0.01, lactic acid 1.7.  Alcohol level less than 10.  Ammonia level 136.4.  UDS positive for benzos.  Influenza A and B negative. SARS-CoV-2 PCR positive.  UA negative for infection.  Chest x-ray normal sinus rhythm and right bundle-branch block.  Chest x-ray no acute chest disease.  CT of the head no acute intracranial findings.  In the ED patient received IV fluid hydration.  Patient was admitted to hospital medicine services for further medical management.    Lactulose initiated and had multiple bowel movements with ammonia clearing and mental status improving  however remains somewhat confused which is not acute.  She had an MRI brain done end of June which noted abnormal signal within both lenticular nuclei involving the both globus pallidus could be from liver disease.  Neurology was consulted this admission and felt her progressive decline over the last few years more so last few months is secondary to liver dysfunction and MRI showing metabolic appearance also could not rule out Alzheimer pathology.    Today  Much more alert and responsive but still having some confusion.  Discussed MBS findings of aspiration noted.  Discuss options which includes hospice/DNR and comfort feed versus PEG tube.  After further discussion  came back to tell me that they would like to proceed with PEG tube.  I discussed PEG tube also comes with its own risk given that she has cirrhosis, thrombocytopenia, portal hypertension she would be at risk for bleeding.  I will ask our palliative physician to have further discussion and see what decision they can come to.  Maybe GI can have their input as well regarding PEG tube placement.          OBJECTIVE/PHYSICAL EXAM     VITAL SIGNS (MOST RECENT):  Temp: 98.6 °F (37 °C) (09/15/23 0701)  Pulse: 70 (09/15/23 0701)  Resp: 18 (09/15/23 0701)  BP: 135/71 (09/15/23 0701)  SpO2: 95 % (09/15/23 0701) VITAL SIGNS (24 HOUR RANGE):  Temp:  [97.8 °F (36.6 °C)-99.5 °F (37.5 °C)] 98.6 °F (37 °C)  Pulse:  [70-89] 70  Resp:  [18] 18  SpO2:  [95 %-97 %] 95 %  BP: (123-154)/(62-72) 135/71   GENERAL: In no acute distress, afebrile, appears generalized weakness  HEENT:  Dry mouth, wet sounding phonation  CHEST: Clear to auscultation bilaterally  HEART: S1, S2, no appreciable murmur  ABDOMEN: Soft, nontender, BS +  MSK: Warm, no lower extremity edema, no clubbing or cyanosis  NEUROLOGIC: Alert and answering questions appropriately, good strength in all 4 extremities, slightly confused  INTEGUMENTARY:  PSYCHIATRY:        ASSESSMENT/PLAN   Acute hepatic  encephalopathy-resolved  Chronic cognitive deficit-Alzheimer dementia versus metabolic 2/2 liver disease   Decompensated cirrhosis   Hypernatremia-resolved   Chronic portal vein thrombosis    History of: HTN, HLD, hypothyroidism, hepatitis-C treated, liver cancer status post multiple TACE procedures      Change lactulose to 30 mg daily to have 3 BMs per day.  Continue rifaximin.  GI following.  Would ask for input regarding PEG tube placement.  Neurology signed off.  Felt slow decline secondary to liver disease and possibly Alzheimer dementia.  Pending repeat MRI brain this admission   stated that she has had slow progressive decline over the last 2 3 years but more so drastically in the last few months.  No evidence of metastasis or recurrence of cancer per the , follows with hepatologist at [facility] following.  MBS done yesterday shows aspiration.  Continue tube feeds for now but  would like to remove this  PT and OT    DVT prophylaxis:  Lovenox 30    Anticipated discharge and disposition:   __________________________________________________________________________    LABS/MICRO/MEDS/DIAGNOSTICS       LABS  Recent Labs     09/15/23  0432      K 3.6   CHLORIDE 113*   CO2 21*   BUN 18.8   CREATININE 0.67   GLUCOSE 148*   CALCIUM 8.0*   ALKPHOS 77   AST 25   ALT 16   ALBUMIN 2.5*       Recent Labs     09/15/23  0432   WBC 9.05   RBC 3.57*   HCT 34.3*   MCV 96.1*   PLT 68*         MICROBIOLOGY  Microbiology Results (last 7 days)       ** No results found for the last 168 hours. **               MEDICATIONS   enoxparin  30 mg Subcutaneous Daily    lactulose 10 gram/15 ml  30 g Per NG tube Daily    levothyroxine  75 mcg Oral Before breakfast    mupirocin   Nasal BID    rifAXIMin  550 mg Oral BID         INFUSIONS           DIAGNOSTIC TESTS  Fl Modified Barium Swallow Speech   Final Result      US Abdomen Complete with Doppler (xpd)   Final Result      Occluded main portal vein, also seen on  "prior exams.         Electronically signed by: Desiree Zepeda   Date:    09/13/2023   Time:    17:17      XR Gastric tube check, non-radiologist performed   Final Result      As above.         Electronically signed by: Casimiro Tirado   Date:    09/12/2023   Time:    16:33      CT Head Without Contrast   Final Result      No acute intracranial findings.         Electronically signed by: Casimiro Tirado   Date:    09/12/2023   Time:    11:49      X-Ray Chest AP Portable   Final Result      No acute chest disease is identified.         Electronically signed by: Parminder Beverly   Date:    09/12/2023   Time:    12:16      MRI Brain W WO Contrast    (Results Pending)        No results found for: "EF"       NUTRITION STATUS  Patient meets ASPEN criteria for moderate malnutrition of acute illness or injury per RD assessment as evidenced by:  Energy Intake (Malnutrition): less than 75% for greater than 7 days  Weight Loss (Malnutrition): greater than 2% in 1 week  Subcutaneous Fat (Malnutrition):  (unable to evaluate)  Muscle Mass (Malnutrition):  (unable to evaluate)  Fluid Accumulation (Malnutrition):  (does not meet criteria)        A minimum of two characteristics is recommended for diagnosis of either severe or non-severe malnutrition.       Case related differential diagnoses have been reviewed; assessment and plan has been documented. I have personally reviewed the labs and test results that are currently available; I have reviewed the patients medication list. I have reviewed the consulting providers recommendations. I have reviewed or attempted to review medical records based upon their availability.  All of the patient's and/or family's questions have been addressed and answered to the best of my ability.  I will continue to monitor closely and make adjustments to medical management as needed.  This document was created using M*Modal Fluency Direct.  Transcription errors may have been made.  Please contact me if any " questions may rise regarding documentation to clarify transcription.        Jasmeet Rush MD   Internal Medicine  Department of Hospital Medicine [facility] - 5th Floor Med Surg

## 2023-09-15 NOTE — PLAN OF CARE
Patient's  at nurses's station requesting NGT removal and to take patient home with [facility]. Does not need palliative consult.  MD notified

## 2023-09-15 NOTE — PT/OT/SLP EVAL
"Physical Therapy Evaluation and Discharge Note    Patient Name:  Tessa Dozier   MRN:  [id number]    Recommendations:     Discharge Recommendations: home (with hospice)  Discharge Equipment Recommendations: hospital bed   Barriers to discharge:  awaiting equipment needs from hospice care    Assessment:     Tessa Dozier is a 83 y.o. female admitted with a medical diagnosis of Altered mental status. Pt with progressive decline in recent weeks. At this time family has chosen to discharge home with hospice care. PT reviewed positioning and transfer safety with . Also discussed equipment needs.  states "they will be getting us what we need".  At this time, patient is functioning at max potential and does not require further acute PT services.     Recent Surgery: * No surgery found *      Plan:     During this hospitalization, patient does not require further acute PT services.  Please re-consult if situation changes.      Subjective     Chief Complaint: increased coughing s/p removal of NGT  Patient/Family Comments/goals: return home  Pain/Comfort:  Pain Rating 1: 0/10  Pain Rating Post-Intervention 1: 0/10    Patients cultural, spiritual, Buddhist conflicts given the current situation: no    Living Environment:  Pt lives at home with . She was total A for ADLs and mobility  Prior to admission, patients level of function was dependent.  Equipment used at home: wheelchair.  DME owned (not currently used): none.  Upon discharge, patient will have assistance from  and hospice care.    Objective:     Communicated with nurse prior to session.  Patient found HOB elevated with pressure relief boots upon PT entry to room.    General Precautions: Standard, fall, aspiration    Orthopedic Precautions:N/A   Braces: N/A  Respiratory Status: Room air  Blood Pressure:     Exams:  Cognitive Exam:  Patient is oriented to Person  RLE ROM: WFL  RLE Strength: grossly 2/5  LLE ROM: WFL  LLE Strength: " grossly 2/5    Functional Mobility:  Bed Mobility:     Rolling Left:  total assistance  Rolling Right: total assistance    AM-PAC 6 CLICK MOBILITY  Total Score:        Treatment and Education:  Pt fatigued with rolling in bed and completion of pericare due to (+) BM. Not appropriate for further mobility at this time.    Patient and spouse were provided with verbal education and demonstrations education regarding positioning and safety with mobility.  Understanding was verbalized.     Patient left HOB elevated with all lines intact, call button in reach, bed alarm on, and CNA present.    GOALS:   Multidisciplinary Problems       Physical Therapy Goals       Not on file                    History:     Past Medical History:   Diagnosis Date    Hypercholesteremia     Hypertension     Liver cancer     Thyroid disease        Past Surgical History:   Procedure Laterality Date    ear lobe surgery      EYE SURGERY  2018       Time Tracking:     PT Received On: 09/15/23  PT Start Time: 1110     PT Stop Time: 1122  PT Total Time (min): 12 min     Billable Minutes: Evaluation moderate      09/15/2023

## 2023-09-15 NOTE — NURSING
Called MRI at around 8pm just to follow up. MRI staff said that they are trying to contact patient's PCP (Dr. Angélica Dominguez) to ask some more questions regarding patient, but office is close until Monday. Patient has a history of aneurysm coiling.

## 2023-09-15 NOTE — PLAN OF CARE
09/15/23 1028   Final Note   Assessment Type Final Discharge Note   [facility]   Post-Acute Status   Post-Acute Authorization Hospice   Hospice Status Set-up Complete/Auth obtained   Coverage Medicare A&B   Discharge Delays None known at this time

## 2023-09-15 NOTE — DISCHARGE SUMMARY
[facility] - 5th Floor Med Surg  HOSPITAL MEDICINE - DISCHARGE SUMMARY    Patient Name: Tessa Dozier  MRN: [id number]  Admission Date: 9/12/2023  Discharge Date: 09/15/2023  Hospital Length of Stay: 3 days  Discharge Provider: Jasmeet Rush MD  Primary Care Provider: Angélica Dominguez MD      HOSPITAL COURSE   Tessa Dozier is a 83 y.o. White female with a past medical history of hypertension, hyperlipidemia, hypothyroidism, hepatitis-C and liver cancer status post multiple TACE procedures.  Patient was admitted to hospital medicine services 08/08/2023 to 08/14/2023 for sepsis secondary to COVID-19 infection which she tested positive on 08/07/2023.  She was treated with remdesivir and Decadron.  Patient was also noted to have NSTEMI at this time.  Since she is been having a rapid decline and function to where she is no longer able to walk or talk.  The patient presented to [facility] on 9/12/2023 with a primary complaint of altered mental status which began yesterday (09/11/2023).  Patient unable to give history and no family at bedside therefore history information gathered from chart.  Patient was seen by neurologist yesterday which EEG was performed and follow-up appointment was scheduled.  After returning home patient became less responsive and not able to speak.   Upon presentation to the ED, temperature 98.7° F, heart rate 73, blood pressure 114/63, respiratory rate 16 SpO2 99% on room air.  Labs with WBC 5.31, CO2 20, BUN/creatinine 33.6/0.77, total bilirubin 3.1, INR 1.4, PT 17.2, PTT 31.4, troponin less than 0.01, lactic acid 1.7.  Alcohol level less than 10.  Ammonia level 136.4.  UDS positive for benzos.  Influenza A and B negative. SARS-CoV-2 PCR positive.  UA negative for infection.  Chest x-ray normal sinus rhythm and right bundle-branch block.  Chest x-ray no acute chest disease.  CT of the head no acute intracranial findings.  In the ED patient received IV fluid hydration.   Patient was admitted to hospital medicine services for further medical management.         Found To have elevated ammonia Lactulose initiated and had multiple bowel movements with ammonia clearing and mental status improving however remains somewhat confused which is not acute.  She had an MRI brain done end of June which noted abnormal signal within both lenticular nuclei involving the both globus pallidus could be from liver disease.  Neurology was consulted this admission and felt her progressive decline over the last few years more so last few months is secondary to liver dysfunction and MRI showing metabolic appearance also could not rule out Alzheimer pathology.  After further discussion with the  he has elected to proceed with comfort feeds and will be taking his wife home on hospice.  Speech therapist found aspiration and severe oropharyngeal dysphagia.   understands the risks associated with this which she stated include danny aspiration and Respiratory arrest and distress.  We did also discuss code status and has elected for do not resuscitate in the event of cardiopulmonary arrest or decline.        PHYSICAL EXAM     Most Recent Vital Signs:  Temp: 98.6 °F (37 °C) (09/15/23 0701)  Pulse: 70 (09/15/23 0701)  Resp: 18 (09/15/23 0701)  BP: 135/71 (09/15/23 0701)  SpO2: 95 % (09/15/23 0701)   GENERAL: In no acute distress, afebrile, appears generalized weakness  HEENT:  Dry mouth, wet sounding phonation  CHEST: Clear to auscultation bilaterally  HEART: S1, S2, no appreciable murmur  ABDOMEN: Soft, nontender, BS +  MSK: Warm, no lower extremity edema, no clubbing or cyanosis  NEUROLOGIC: Alert and answering questions appropriately, good strength in all 4 extremities, slightly confused          DISCHARGE DIAGNOSIS   Acute hepatic encephalopathy-resolved  Chronic cognitive deficit-Alzheimer dementia versus metabolic 2/2 liver disease   Decompensated cirrhosis   Hypernatremia-resolved   Chronic  portal vein thrombosis  Severe oropharyngeal dysphagia     History of: HTN, HLD, hypothyroidism, hepatitis-C treated, liver cancer status post multiple TACE procedures    _____________________________________________________________________________      DISCHARGE MED REC     Current Discharge Medication List        START taking these medications    Details   lactulose 10 gram/15 ml (CHRONULAC) 10 gram/15 mL (15 mL) solution Take 30 mLs (20 g total) by mouth once daily.  Qty: 900 mL, Refills: 0           CONTINUE these medications which have NOT CHANGED    Details   ALPRAZolam (XANAX) 2 MG Tab Take 1 tablet (2 mg total) by mouth every 24 hours as needed (anxiety).  Qty: 30 tablet, Refills: 2    Comments: Not to exceed 2 additional fills before 07/08/2023 DX Code Needed  .  Associated Diagnoses: Generalized anxiety disorder      carvediloL (COREG) 3.125 MG tablet TAKE 1 TABLET BY MOUTH TWICE A DAY  Qty: 180 tablet, Refills: 3      hydrocortisone (ANUSOL-HC) 2.5 % rectal cream Place rectally 2 (two) times daily as needed for Hemorrhoids.  Qty: 28 g, Refills: 3    Associated Diagnoses: Hemorrhoids, unspecified hemorrhoid type      levothyroxine (SYNTHROID) 75 MCG tablet Take 1 tablet (75 mcg total) by mouth before breakfast.  Qty: 90 tablet, Refills: 3      mupirocin (BACTROBAN) 2 % ointment Apply topically 3 (three) times daily.  Qty: 15 g, Refills: 0      pravastatin (PRAVACHOL) 40 MG tablet Take 40 mg by mouth once daily.                CONSULTS     Consults (From admission, onward)          Status Ordering Provider     Inpatient consult to Social Work/Case Management  Once        Provider:  (Not yet assigned)    Completed MYKEL DUGAN     Inpatient consult to Gastroenterology  Once        Provider:  Casimiro Regan MD    Completed JANIS MENDEZ     Inpatient consult to Neurology  Once        Provider:  Mykel Dugan MD    Completed JANIS MENDEZ     Inpatient consult to Registered Dietitian/Nutritionist  Once         Provider:  (Not yet assigned)    Completed JANIS MENDEZ              FOLLOW UP      Follow-up Information       Angélica Dominguez MD Follow up in 1 week(s).    Specialty: Family Medicine  Contact information:  [address] [facility]  [address]  [facility] [address]  [phone]                                 DISCHARGE INSTRUCTIONS     Explained in detail to the patient about the discharge plan, medications, and follow-up visits. Pt understands and agrees with the treatment plan.  Discharged Condition: stable  Diet as tolerated  Activities as tolerated  Discharge to: Hospice/Home    TIME SPENT ON DISCHARGE   35 minutes        Janis Mendez MD  Internal Medicine  Department of Hospital Medicine  [facility] - 5th Floor Med Surg      This document was created using electronic dictation services.  Please excuse any errors that may have been made.  Contact me if any questions regarding documentation to clarify verbiage.

## 2023-09-19 ENCOUNTER — DOCUMENT SCAN (OUTPATIENT)
Dept: HOME HEALTH SERVICES | Facility: HOSPITAL | Age: 83
End: 2023-09-19
Payer: MEDICARE

## 2023-09-20 DIAGNOSIS — K64.9 HEMORRHOIDS, UNSPECIFIED HEMORRHOID TYPE: ICD-10-CM

## 2023-09-20 DIAGNOSIS — F41.1 GENERALIZED ANXIETY DISORDER: ICD-10-CM

## 2023-09-21 RX ORDER — HYDROCORTISONE 25 MG/G
CREAM TOPICAL 2 TIMES DAILY PRN
Qty: 28 G | Refills: 3
Start: 2023-09-21

## 2023-09-21 RX ORDER — ALPRAZOLAM 2 MG/1
2 TABLET ORAL
Qty: 30 TABLET | Refills: 2 | Status: SHIPPED | OUTPATIENT
Start: 2023-09-21 | End: 2023-10-14 | Stop reason: SDUPTHER

## 2023-09-22 NOTE — TELEPHONE ENCOUNTER
----- Message from Carlos Manuel Lan sent at 3/14/2023 10:31 AM CDT -----  .Type:  Patient Requesting Referral    Who Called:pt  Does the patient already have the specialty appointment scheduled?:no  If yes, what is the date of that appointment?:  Referral to What Specialty:Urology  Reason for Referral:  Does the patient want the referral with a specific physician?:Dr. Ron Resendez  Is the specialist an [facility]?:  Patient Requesting a Response?:  Would the patient rather a call back or a response via [facility]? Call back  Best Call Back Number: [phone]  Additional Information:         [employer] Counseling:  I discussed with the patient the risks of mycophenolate mofetil including but not limited to infection/immunosuppression, GI upset, hypokalemia, hypercholesterolemia, bone marrow suppression, lymphoproliferative disorders, malignancy, GI ulceration/bleed/perforation, colitis, interstitial lung disease, kidney failure, progressive multifocal leukoencephalopathy, and birth defects.  The patient understands that monitoring is required including a baseline creatinine and regular CBC testing. In addition, patient must alert us immediately if symptoms of infection or other concerning signs are noted.

## 2023-10-04 DIAGNOSIS — E03.9 HYPOTHYROIDISM, UNSPECIFIED TYPE: Primary | ICD-10-CM

## 2023-10-04 RX ORDER — LEVOTHYROXINE SODIUM 75 UG/1
75 TABLET ORAL
Qty: 90 TABLET | Refills: 3 | Status: SHIPPED | OUTPATIENT
Start: 2023-10-04

## 2023-10-14 DIAGNOSIS — F41.1 GENERALIZED ANXIETY DISORDER: ICD-10-CM

## 2023-10-16 RX ORDER — ALPRAZOLAM 2 MG/1
2 TABLET ORAL
Qty: 30 TABLET | Refills: 2 | Status: SHIPPED | OUTPATIENT
Start: 2023-10-16 | End: 2023-11-20 | Stop reason: SDUPTHER

## 2023-11-20 DIAGNOSIS — F41.1 GENERALIZED ANXIETY DISORDER: ICD-10-CM

## 2023-11-20 RX ORDER — ALPRAZOLAM 2 MG/1
2 TABLET ORAL
Qty: 30 TABLET | Refills: 2 | Status: SHIPPED | OUTPATIENT
Start: 2023-11-20 | End: 2023-12-20 | Stop reason: SDUPTHER

## 2023-12-20 DIAGNOSIS — F41.1 GENERALIZED ANXIETY DISORDER: ICD-10-CM

## 2023-12-20 RX ORDER — ALPRAZOLAM 2 MG/1
2 TABLET ORAL
Qty: 30 TABLET | Refills: 2 | Status: SHIPPED | OUTPATIENT
Start: 2023-12-20 | End: 2024-01-21 | Stop reason: SDUPTHER

## 2024-01-21 DIAGNOSIS — F41.1 GENERALIZED ANXIETY DISORDER: ICD-10-CM

## 2024-01-22 RX ORDER — ALPRAZOLAM 2 MG/1
TABLET ORAL
Qty: 30 TABLET | Refills: 0 | Status: SHIPPED | OUTPATIENT
Start: 2024-01-27 | End: 2024-02-21 | Stop reason: SDUPTHER

## 2024-02-21 ENCOUNTER — OFFICE VISIT (OUTPATIENT)
Dept: FAMILY MEDICINE | Facility: CLINIC | Age: 84
End: 2024-02-21
Payer: MEDICARE

## 2024-02-21 VITALS
WEIGHT: 99.38 LBS | DIASTOLIC BLOOD PRESSURE: 75 MMHG | HEIGHT: 64 IN | RESPIRATION RATE: 17 BRPM | SYSTOLIC BLOOD PRESSURE: 121 MMHG | HEART RATE: 75 BPM | OXYGEN SATURATION: 99 % | BODY MASS INDEX: 16.97 KG/M2

## 2024-02-21 DIAGNOSIS — D69.6 THROMBOCYTOPENIA: ICD-10-CM

## 2024-02-21 DIAGNOSIS — G12.9 SPINAL MUSCULAR ATROPHY, UNSPECIFIED: ICD-10-CM

## 2024-02-21 DIAGNOSIS — F41.1 GENERALIZED ANXIETY DISORDER: ICD-10-CM

## 2024-02-21 DIAGNOSIS — D70.9 NEUTROPENIA, UNSPECIFIED TYPE: ICD-10-CM

## 2024-02-21 DIAGNOSIS — C22.0 HEPATOCELLULAR CARCINOMA: ICD-10-CM

## 2024-02-21 DIAGNOSIS — I50.9 CONGESTIVE HEART FAILURE, UNSPECIFIED HF CHRONICITY, UNSPECIFIED HEART FAILURE TYPE: ICD-10-CM

## 2024-02-21 DIAGNOSIS — D61.818 OTHER PANCYTOPENIA: ICD-10-CM

## 2024-02-21 DIAGNOSIS — I70.0 ATHEROSCLEROSIS OF AORTA: ICD-10-CM

## 2024-02-21 DIAGNOSIS — K76.82 HEPATIC ENCEPHALOPATHY: ICD-10-CM

## 2024-02-21 DIAGNOSIS — Z00.00 MEDICARE ANNUAL WELLNESS VISIT, SUBSEQUENT: Primary | ICD-10-CM

## 2024-02-21 PROCEDURE — G0439 PPPS, SUBSEQ VISIT: HCPCS | Mod: GW,,, | Performed by: FAMILY MEDICINE

## 2024-02-21 RX ORDER — ALPRAZOLAM 2 MG/1
2 TABLET ORAL NIGHTLY PRN
Qty: 30 TABLET | Refills: 5 | Status: SHIPPED | OUTPATIENT
Start: 2024-02-21 | End: 2024-03-25 | Stop reason: SDUPTHER

## 2024-02-21 RX ORDER — LACTULOSE 10 G/15ML
15 SOLUTION ORAL; RECTAL DAILY
COMMUNITY
Start: 2024-02-14

## 2024-02-21 NOTE — PROGRESS NOTES
Patient ID: [id number]     Chief Complaint: Medicare AWV        HPI:     Tessa Dozier is a 83 y.o. female here today for a Medicare Wellness.   Well Adult History   The patient presents for well adult exam, currently on Hospice Care with [facility]. The patient's general health status is described as fair. The patient's diet is described as balanced. Exercise: no longer walking, wheelchair bound. Associated symptoms consist of none. Last menstrual period: patient no longer menstruates due to hysterectomy. Additional pertinent history: seat belt use, occasional caffeine use, tobacco use none, no alcohol use and She is not interested in lab work She is UTD on all vaccines. She is UTD on Colonoscopy with GI (Dr. Bonilla) on 2014. She does have hepatocellular carcinoma/esophageal varices, stable, was followed by GI (Dr. Bonilla) and Hepatologist in [place] (Dr. Pierce), no longer interested in seeing specialists, comfortable in Hospice. She is UTD on eye exam with Ophthalmology. She is UTD on Dental exam. Last MMG: UTD, she refuses MMG. Last DEXA: 07/06/2017, at [facility], she refuses DEXA scan. Hypothyroidism is controlled with Rx, no side effects. Anxiety is controlled with Rx, no side effects, asymptomatic, she needs a refill of Xanax today. She was seeing Cardiology at [facility] for heart health/atherosclerosis, asymptomatic. Pancytopenia is stable and asymptomatic, was followed by Hepatologist.  - Patient and  (caregiver) are without any other complaints today.    denies Urinary leakage.  admits to Recent falls or balance difficulty, no falls, wheelchair bound.   denies Daily exercise or physical activity.  admits to Depression, stress, anxiety, or emotional lability.   denies The need for healthcare treatment including a cane/walker, blood pressure monitoring, or regular vision/hearing tests.       A separate E/M code has been provided to evaluate additional complaints that the patient would like addressed  "during the dedicated Medicare Wellness Exam.    Patient Care Team:  Angélica Dominguez MD as PCP - General (Family Medicine)  SOLO High MD as Consulting Physician (Otolaryngology)  Jaxon Pierce MD as Consulting Physician (Hepatology)     Opioid Screening: Patient medication list reviewed, patient is not taking prescription opioids. Patient is not using additional opioids than prescribed. Patient is at low risk of substance abuse based on this opioid use history.      Advance Care Planning     Date: 02/21/2024    Power of   I initiated the process of voluntary advance care planning today and explained the importance of this process to the patient.  I introduced the concept of advance directives to the patient, as well. Then the patient received detailed information about the importance of designating a Health Care Power of  (HCPOA). She was also instructed to communicate with this person about their wishes for future healthcare, should she become sick and lose decision-making capacity. The patient has previously appointed a HCPOA. After our discussion, the patient has decided to complete a HCPOA and has appointed her significant other, health care agent:  Rodriguez Hall  & health care agent number:  [phone]  I spent a total time of 5 minutes discussing this issue with the patient.             ----------------------------  Hypercholesteremia  Hypertension  Liver cancer  Thyroid disease     Past Surgical History:   Procedure Laterality Date    COSMETIC SURGERY  2019    ear lobe surgery      EYE SURGERY  2018    HYSTERECTOMY  1976       Review of patient's allergies indicates:   Allergen Reactions    Promethazine      Other reaction(s): hallucinates    Azo-sulfisoxazole      Other reaction(s): rash    Ciprofloxacin      Other reaction(s): hives    Linaclotide      Other reaction(s): "feels awful"       Outpatient Medications Marked as Taking for the 2/21/24 encounter " (Office Visit) with Angélica Dominguez MD   Medication Sig Dispense Refill    carvediloL (COREG) 3.125 MG tablet TAKE 1 TABLET BY MOUTH TWICE A  tablet 3    hydrocortisone (ANUSOL-HC) 2.5 % rectal cream Place rectally 2 (two) times daily as needed for Hemorrhoids. 28 g 3    lactulose (CHRONULAC) 10 gram/15 mL solution Take 15 mLs by mouth once daily.      levothyroxine (SYNTHROID) 75 MCG tablet Take 1 tablet (75 mcg total) by mouth before breakfast. 90 tablet 3    mupirocin (BACTROBAN) 2 % ointment Apply topically 3 (three) times daily. 15 g 0    pravastatin (PRAVACHOL) 40 MG tablet Take 40 mg by mouth once daily.      [DISCONTINUED] ALPRAZolam (XANAX) 2 MG Tab Take 1 tab po prn for Insomnia and panic attack 30 tablet 0       Social History     Socioeconomic History    Marital status:    Tobacco Use    Smoking status: Never     Passive exposure: Never    Smokeless tobacco: Never   Substance and Sexual Activity    Alcohol use: Never    Drug use: Never    Sexual activity: Not Currently     Social Determinants of Health     Financial Resource Strain: Low Risk  (8/9/2023)    Overall Financial Resource Strain (CARDIA)     Difficulty of Paying Living Expenses: Not hard at all   Food Insecurity: No Food Insecurity (8/9/2023)    Hunger Vital Sign     Worried About Running Out of Food in the Last Year: Never true     Ran Out of Food in the Last Year: Never true   Transportation Needs: No Transportation Needs (8/9/2023)    PRAPARE - Transportation     Lack of Transportation (Medical): No     Lack of Transportation (Non-Medical): No   Physical Activity: Unknown (8/9/2023)    Exercise Vital Sign     Days of Exercise per Week: 5 days   Stress: No Stress Concern Present (8/9/2023)    [employer] - Occupational Stress Questionnaire     Feeling of Stress : Not at all   Social Connections: Unknown (8/9/2023)    Social Connection and Isolation Panel [NHANES]     Frequency of  "Communication with Friends and Family: Once a week     Frequency of Social Gatherings with Friends and Family: Once a week     Active Member of Clubs or Organizations: Yes     Attends Club or Organization Meetings: Patient declined     Marital Status:    Housing Stability: Low Risk  (8/9/2023)    Housing Stability Vital Sign     Unable to Pay for Housing in the Last Year: No     Number of Places Lived in the Last Year: 1     Unstable Housing in the Last Year: No        History reviewed. No pertinent family history.     Subjective:     ROS      See HPI for details    Constitutional: Denies Change in appetite. Denies Chills. Denies Fever. Denies Night sweats.  Eye: Denies Blurred vision. Denies Discharge. Denies Eye pain.  ENT: Denies Decreased hearing. Denies Sore throat. Denies Swollen glands.  Respiratory: Denies Cough. Denies Shortness of breath. Denies Shortness of breath with exertion. Denies Wheezing.  Cardiovascular: Denies Chest pain at rest. Denies Chest pain with exertion. Denies Irregular heartbeat. Denies Palpitations.  Gastrointestinal: Denies Abdominal pain. Denies Diarrhea. Denies Nausea. Denies Vomiting. Denies Hematemesis or Hematochezia.  Genitourinary: Denies Dysuria. Denies Urinary frequency. Denies Urinary urgency. Denies Blood in urine.  Endocrine: Denies Cold intolerance. Denies Excessive thirst. Denies Heat intolerance. Denies Weight loss. Denies Weight gain.  Musculoskeletal: Denies Painful joints. Denies Weakness.  Integumentary: Denies Rash. Denies Itching. Denies Dry skin.  Neurologic: Denies Dizziness. Denies Fainting. Denies Headache.  Psychiatric: Denies Depression. Reports Anxiety. Denies Suicidal/Homicidal ideations.    All Other ROS: Negative except as stated in HPI.       Objective:     /75 (Patient Position: Sitting, BP Method: Medium (Automatic))   Pulse 75   Resp 17   Ht 5' 4" (1.626 m)   Wt 45.1 kg (99 lb 6.4 oz)   SpO2 99%   BMI 17.06 kg/m²     Physical " Exam    General: Alert and oriented, No acute distress.  Head: Normocephalic, Atraumatic.  Eye: Pupils are equal, round and reactive to light, Extraocular movements are intact, Sclera non-icteric.  Ears/Nose/Throat: Normal, Mucosa moist,Clear.  Neck/Thyroid: Supple, Non-tender, No carotid bruit, No palpable thyromegaly or thyroid nodule, No lymphadenopathy, No JVD, Full range of motion.  Respiratory: Clear to auscultation bilaterally; No wheezes, rales or rhonchi,Non-labored respirations, Symmetrical chest wall expansion.  Cardiovascular: Regular rate and rhythm, S1/S2 normal, No murmurs, rubs or gallops.  Gastrointestinal: Soft, Non-tender, Non-distended, Normal bowel sounds, No palpable organomegaly.  Musculoskeletal: Normal range of motion.  Integumentary: Warm, Dry, Intact, No suspicious lesions or rashes.  Extremities: No clubbing, cyanosis or edema  Neurologic: No focal deficits, Cranial Nerves II-XII are grossly intact, Motor strength normal upper and lower extremities, Sensory exam intact.  Psychiatric: Normal interaction, Coherent speech, Euthymic mood, Appropriate affect       Assessment:       ICD-10-CM ICD-9-CM   1. Medicare annual wellness visit, subsequent  Z00.00 V70.0   2. Hepatocellular carcinoma  C22.0 155.0   3. Hepatic encephalopathy  K76.82 572.2   4. Congestive heart failure, unspecified HF chronicity, unspecified heart failure type  I50.9 428.0   5. Spinal muscular atrophy, unspecified  G12.9 335.10   6. Neutropenia, unspecified type  D70.9 288.00   7. Thrombocytopenia  D69.6 287.5   8. Atherosclerosis of aorta  I70.0 440.0   9. Other pancytopenia  D61.818 284.19   10. Generalized anxiety disorder  F41.1 300.02        Plan:       Medicare Annual Wellness and Personalized Prevention Plan:     Fall Risk + Home Safety + Hearing Impairment + Depression Screen + Cognitive Impairment Screen + Health Risk Assessment all reviewed.          No data to display                  2/21/2024     1:19 PM  9/5/2023     1:30 PM 7/12/2023     9:39 AM 6/26/2023     1:22 PM 6/7/2023     1:11 PM 2/13/2023     1:30 PM 11/2/2022    10:38 AM   Depression Screeening PHQ2   Over the last two weeks how often have you been bothered by little interest or pleasure in doing things 0 0 0 0 0 0 0   Over the last two weeks how often have you been bothered by feeling down, depressed or hopeless 0 0 0 0 0 0 0   PHQ-2 Total Score 0 0 0 0 0 0 0         2/21/2024     2:00 PM 9/5/2023     1:30 PM 7/12/2023     9:40 AM 6/26/2023     1:20 PM 6/7/2023     1:00 PM 2/13/2023     1:30 PM 11/2/2022    10:15 AM   Fall Risk Assessment - Outpatient   Mobility Status Wheelchair Bound Ambulatory w/ assistance Ambulatory Ambulatory w/ assistance Ambulatory Ambulatory Ambulatory   Number of falls 0 1 0 1 0 0 0   Identified as fall risk False True False True False False False             What is your age?: 80 or older  Are you male or female?: Female  During the past four weeks, how much have you been bothered by emotional problems such as feeling anxious, depressed, irritable, sad, or downhearted and blue?: Not at all  During the past five weeks, has your physical and/or emotional health limited your social activities with family, friends, neighbors, or groups?: Not at all  During the past four weeks, how much bodily pain have you generally had?: No pain  During the past four weeks, was someone available to help if you needed and wanted help?: Yes, as much as I wanted  During the past four weeks, what was the hardest physical activity you could do for at least two minutes?: Very light  Can you get to places out of walking distance without help?  (For example, can you travel alone on buses or taxis, or drive your own car?): No (Family helps (usually ))  Can you go shopping for groceries or clothes without someone's help?: No (Family helps (usually ))  Can you prepare your own meals?: No (Family helps (usually ))  Can you do your own  housework without help?: No (Family helps (usually ))  Because of any health problems, do you need the help of another person with your personal care needs such as eating, bathing, dressing, or getting around the house?: No (Family helps (usually ))  Can you handle your own money without help?: No (Family helps (usually helps))  During the past four weeks, how would you rate your health in general?: Fair  How have things been going for you during the past four weeks?: Pretty well  Are you having difficulties driving your car?: Not applicable, I do not use a car  Do you always fasten your seat belt when you are in a car?: Yes, usually  How often in the past four weeks have you been bothered by falling or dizzy when standing up?: Never  How often in the past four weeks have you been bothered by sexual problems?: Never  How often in the past four weeks have you been bothered by trouble eating well?: Never  How often in the past four weeks have you been bothered by teeth or denture problems?: Never  How often in the past four weeks have you been bothered with problems using the telephone?: Never  How often in the past four weeks have you been bothered by tiredness or fatigue?: Never  Have you fallen two or more times in the past year?: No  Are you afraid of falling?: No  Are you a smoker?: No  During the past four weeks, how many drinks of wine, beer, or other alcoholic beverages did you have?: No alcohol at all  Do you exercise for about 20 minutes three or more days a week?: No, I usually do not exercise this much (Wheelchair bound.)  Have you been given any information to help you with hazards in your house that might hurt you?: No  Have you been given any information to help you with keeping track of your medications?: Yes  How often do you have trouble taking medicines the way you've been told to take them?: I always take them as prescribed  How confident are you that you can control and manage most  of your health problems?: Very confident  What is your race? (Check all that apply.):                  Alcohol/Tobacco Use - Stressed importance of smoking cessation and limiting alcohol intake.  CVD Risk Factors - Reviewed  Obesity/Physical Activity - Normal BMI. Encouraged daily 30 minute physical activity x 5 days per week.      Health Maintenance Topics with due status: Not Due       Topic Last Completion Date    Lipid Panel 06/07/2023        Vaccinations -   Immunization History   Administered Date(s) Administered    Influenza (FLUAD) - Quadrivalent - Adjuvanted - PF *Preferred* (65+) 10/26/2020    Influenza - High Dose - PF (65 years and older) 10/28/2018, 10/26/2019    Influenza - Quadrivalent - High Dose - PF (65 years and older) 10/27/2022    Influenza - Quadrivalent - PF *Preferred* (6 months and older) 01/06/2016    Influenza - Trivalent - PF (ADULT) 11/08/2014, 01/06/2016, 12/19/2017, 11/01/2019, 10/12/2021    Pneumococcal Polysaccharide - 23 Valent 10/08/2016    Zoster Recombinant 02/22/2022          1. Medicare annual wellness visit, subsequent  - Continue care with Hospice. Patient and caregiver are not interested in any labs or additional treatment at this time, will continue to follow as needed.     2. Hepatocellular carcinoma  - Continue care with Hospice. Patient and caregiver are not interested in any labs or additional treatment at this time, will continue to follow as needed.     3. Hepatic encephalopathy  - Stable, continue care with Hospice. Patient and caregiver are not interested in any labs or additional treatment at this time, will continue to follow as needed.     4. Congestive heart failure, unspecified HF chronicity, unspecified heart failure type  - Asymptomatic, continue care with Hospice. Patient and caregiver are not interested in any labs or additional treatment at this time, will continue to follow as needed.     5. Spinal muscular atrophy, unspecified  - Asymptomatic,  continue care with Hospice. Patient and caregiver are not interested in any labs or additional treatment at this time, will continue to follow as needed.     6. Neutropenia, unspecified type  - Asymptomatic, continue care with Hospice. Patient and caregiver are not interested in any labs or additional treatment at this time, will continue to follow as needed.     7. Thrombocytopenia  - Asymptomatic, continue care with Hospice. Patient and caregiver are not interested in any labs or additional treatment at this time, will continue to follow as needed.     8. Atherosclerosis of aorta  - Asymptomatic, continue care with Hospice. Patient and caregiver are not interested in any labs or additional treatment at this time, will continue to follow as needed.     9. Other pancytopenia  - Asymptomatic, continue care with Hospice. Patient and caregiver are not interested in any labs or additional treatment at this time, will continue to follow as needed.      10. Generalized anxiety disorder  - ALPRAZolam (XANAX) 2 MG Tab; Take 1 tablet (2 mg total) by mouth nightly as needed (panic attack and insomnia). Take 1 tab po prn for Insomnia and panic attack  Dispense: 30 tablet; Refill: 5  - Continue Xanax pr.n. with close monitoring, Rx Xanax refilled today.  reviewed today. Continue relaxation techniques. Will titrate medication as needed/tolerated. Notify M.D. or ER if symptoms persist or worsen, SI/HI, temp greater than 100.4, or any acute illness.    Start   /  Continue   Practice deep breathing or abdominal breathing exercises when anxiety occurs.  Exercise daily. Get sunlight daily.  Avoid caffeine, alcohol and stimulants.  Practice positive phrases and repeat throughout the day, yoga, lavender scents or Chamomile tea will help anxiety.  Set healthy boundaries, avoid people and conversations that increase stress.  Reports any symptoms of suicidal or homicidal ideations immediately, if clinic is closed go to nearest  emergency room.       Medication List with Changes/Refills   Current Medications    CARVEDILOL (COREG) 3.125 MG TABLET    TAKE 1 TABLET BY MOUTH TWICE A DAY       Start Date: 1/9/2023  End Date: --    HYDROCORTISONE (ANUSOL-HC) 2.5 % RECTAL CREAM    Place rectally 2 (two) times daily as needed for Hemorrhoids.       Start Date: 9/21/2023 End Date: --    LACTULOSE (CHRONULAC) 10 GRAM/15 ML SOLUTION    Take 15 mLs by mouth once daily.       Start Date: 2/14/2024 End Date: --    LEVOTHYROXINE (SYNTHROID) 75 MCG TABLET    Take 1 tablet (75 mcg total) by mouth before breakfast.       Start Date: 10/4/2023 End Date: --    MUPIROCIN (BACTROBAN) 2 % OINTMENT    Apply topically 3 (three) times daily.       Start Date: 8/28/2023 End Date: --    PRAVASTATIN (PRAVACHOL) 40 MG TABLET    Take 40 mg by mouth once daily.       Start Date: 4/8/2023  End Date: --   Changed and/or Refilled Medications    Modified Medication Previous Medication    ALPRAZOLAM (XANAX) 2 MG TAB ALPRAZolam (XANAX) 2 MG Tab       Take 1 tablet (2 mg total) by mouth nightly as needed (panic attack and insomnia). Take 1 tab po prn for Insomnia and panic attack    Take 1 tab po prn for Insomnia and panic attack       Start Date: 2/21/2024 End Date: --    Start Date: 1/27/2024 End Date: 2/21/2024          The patient's Health Maintenance was reviewed and the following appears to be due at this time:   There are no preventive care reminders to display for this patient.        Follow up in about 6 months (around 8/21/2024) for Anxiety followup.

## 2024-03-25 DIAGNOSIS — F41.1 GENERALIZED ANXIETY DISORDER: ICD-10-CM

## 2024-03-25 RX ORDER — ALPRAZOLAM 2 MG/1
2 TABLET ORAL NIGHTLY PRN
Qty: 30 TABLET | Refills: 5 | Status: SHIPPED | OUTPATIENT
Start: 2024-03-25 | End: 2024-04-24 | Stop reason: SDUPTHER

## 2024-04-24 DIAGNOSIS — F41.1 GENERALIZED ANXIETY DISORDER: ICD-10-CM

## 2024-04-25 RX ORDER — ALPRAZOLAM 2 MG/1
2 TABLET ORAL NIGHTLY PRN
Qty: 30 TABLET | Refills: 0 | Status: SHIPPED | OUTPATIENT
Start: 2024-04-25

## 2024-06-26 DIAGNOSIS — E03.9 HYPOTHYROIDISM, UNSPECIFIED TYPE: ICD-10-CM

## 2024-06-27 RX ORDER — LEVOTHYROXINE SODIUM 75 UG/1
75 TABLET ORAL
Qty: 90 TABLET | Refills: 3 | Status: SHIPPED | OUTPATIENT
Start: 2024-06-27

## 2024-08-03 DIAGNOSIS — K64.9 HEMORRHOIDS, UNSPECIFIED HEMORRHOID TYPE: ICD-10-CM

## 2024-08-05 RX ORDER — HYDROCORTISONE 25 MG/G
CREAM TOPICAL 2 TIMES DAILY PRN
Qty: 28 G | Refills: 3
Start: 2024-08-05

## 2024-08-16 ENCOUNTER — TELEPHONE (OUTPATIENT)
Dept: FAMILY MEDICINE | Facility: CLINIC | Age: 84
End: 2024-08-16
Payer: MEDICARE

## 2024-08-16 NOTE — TELEPHONE ENCOUNTER
Are there any outstanding tasks in the patients's chart (ex.labs,MM,etc)  no  Do we have outstanding/pending referrals  no  Has the patient been seen in and ER,[facility], or been admitted since last visit  yes  Has the patient seen any other health care provider(doctors) since last visit  yes  Has the patient had any bloodwork or x-rays done since last visit  yes

## 2024-08-21 ENCOUNTER — OFFICE VISIT (OUTPATIENT)
Dept: FAMILY MEDICINE | Facility: CLINIC | Age: 84
End: 2024-08-21
Payer: MEDICARE

## 2024-08-21 VITALS
SYSTOLIC BLOOD PRESSURE: 110 MMHG | HEIGHT: 64 IN | BODY MASS INDEX: 16.9 KG/M2 | OXYGEN SATURATION: 97 % | WEIGHT: 99 LBS | RESPIRATION RATE: 14 BRPM | HEART RATE: 86 BPM | DIASTOLIC BLOOD PRESSURE: 65 MMHG

## 2024-08-21 DIAGNOSIS — F41.1 GENERALIZED ANXIETY DISORDER: Primary | ICD-10-CM

## 2024-08-21 DIAGNOSIS — R56.9 CONVULSIONS, UNSPECIFIED CONVULSION TYPE: ICD-10-CM

## 2024-08-21 PROCEDURE — 99213 OFFICE O/P EST LOW 20 MIN: CPT | Mod: ,,, | Performed by: FAMILY MEDICINE

## 2024-08-21 RX ORDER — ALPRAZOLAM 2 MG/1
2 TABLET ORAL NIGHTLY PRN
Qty: 30 TABLET | Refills: 2 | Status: SHIPPED | OUTPATIENT
Start: 2024-08-21

## 2024-08-21 NOTE — PROGRESS NOTES
"   Patient ID: [id number]     Chief Complaint: Anxiety        HPI:     Tessa Dozier is a 84 y.o. female here today for a follow up anxiety.   - Hypothyroidism is controlled with Rx, no side effects.   - Anxiety is controlled with Rx, no side effects, asymptomatic, she needs a refill of Xanax today. She was seeing Cardiology at [facility] for heart health/atherosclerosis, asymptomatic.   - Pancytopenia is stable and asymptomatic, was followed by Hepatologist.  - She has history of seizures, asymptomatic, hasn't had a seizures in over a year.   - She has been admitted to [facility], and no longer needs labs done.  - Patient and  (caregiver) are without any other complaints today.         -------------------------------------    Hypercholesteremia    Hypertension    Liver cancer    Thyroid disease        Past Surgical History:   Procedure Laterality Date    COSMETIC SURGERY  2019    ear lobe surgery      EYE SURGERY  2018    HYSTERECTOMY  1976       Review of patient's allergies indicates:   Allergen Reactions    Promethazine      Other reaction(s): hallucinates    Azo-sulfisoxazole      Other reaction(s): rash    Ciprofloxacin      Other reaction(s): hives    Linaclotide      Other reaction(s): "feels awful"       Outpatient Medications Marked as Taking for the 8/21/24 encounter (Office Visit) with Angélica Dominguez MD   Medication Sig Dispense Refill    carvediloL (COREG) 3.125 MG tablet TAKE 1 TABLET BY MOUTH TWICE A  tablet 3    hydrocortisone (ANUSOL-HC) 2.5 % rectal cream Place rectally 2 (two) times daily as needed for Hemorrhoids. 28 g 3    levothyroxine (SYNTHROID) 75 MCG tablet Take 1 tablet (75 mcg total) by mouth before breakfast. 90 tablet 3    [DISCONTINUED] ALPRAZolam (XANAX) 2 MG Tab Take 1 tablet (2 mg total) by mouth nightly as needed (panic attack and insomnia). Take 1 tab po prn for Insomnia and panic attack 30 tablet 0       Social History     Socioeconomic History    Marital " status:    Tobacco Use    Smoking status: Never     Passive exposure: Never    Smokeless tobacco: Never   Substance and Sexual Activity    Alcohol use: Never    Drug use: Never    Sexual activity: Not Currently     Social Determinants of Health     Financial Resource Strain: Low Risk  (8/14/2024)    Overall Financial Resource Strain (CARDIA)     Difficulty of Paying Living Expenses: Not hard at all   Food Insecurity: No Food Insecurity (8/14/2024)    Hunger Vital Sign     Worried About Running Out of Food in the Last Year: Never true     Ran Out of Food in the Last Year: Never true   Transportation Needs: No Transportation Needs (8/9/2023)    PRAPARE - Transportation     Lack of Transportation (Medical): No     Lack of Transportation (Non-Medical): No   Physical Activity: Unknown (8/14/2024)    Exercise Vital Sign     Days of Exercise per Week: 0 days   Stress: No Stress Concern Present (8/14/2024)    [employer] - Occupational Stress Questionnaire     Feeling of Stress : Only a little   Housing Stability: Low Risk  (8/9/2023)    Housing Stability Vital Sign     Unable to Pay for Housing in the Last Year: No     Number of Places Lived in the Last Year: 1     Unstable Housing in the Last Year: No        No family history on file.     Subjective:       Review of Systems:    See HPI for details    Constitutional: Denies Change in appetite. Denies Chills. Denies Fever. Denies Night sweats.  Eye: Denies Blurred vision. Denies Discharge. Denies Eye pain.  ENT: Denies Decreased hearing. Denies Sore throat. Denies Swollen glands.  Respiratory: Denies Cough. Denies Shortness of breath. Denies Shortness of breath with exertion. Denies Wheezing.  Cardiovascular: Denies Chest pain at rest. Denies Chest pain with exertion. Denies Irregular heartbeat. Denies Palpitations.  Gastrointestinal: Denies Abdominal pain. Denies Diarrhea. Denies Nausea. Denies Vomiting. Denies Hematemesis or  "Hematochezia.  Genitourinary: Denies Dysuria. Denies Urinary frequency. Denies Urinary urgency. Denies Blood in urine.  Endocrine: Denies Cold intolerance. Denies Excessive thirst. Denies Heat intolerance. Denies Weight loss. Denies Weight gain.  Musculoskeletal: Denies Painful joints. Denies Weakness.  Integumentary: Denies Rash. Denies Itching. Denies Dry skin.  Neurologic: Denies Dizziness. Denies Fainting. Denies Headache.  Psychiatric: Denies Depression. Reports Anxiety. Denies Suicidal/Homicidal ideations.    All Other ROS: Negative except as stated in HPI.       Objective:     /65 (BP Location: Right arm, Patient Position: Sitting, BP Method: Medium (Automatic))   Pulse 86   Resp 14   Ht 5' 4" (1.626 m)   Wt 44.9 kg (99 lb)   SpO2 97%   BMI 16.99 kg/m²     Physical Exam    General: Alert and oriented, No acute distress. Wheel-chair bound.  Head: Normocephalic, Atraumatic.  Eye: Pupils are equal, round and reactive to light, Extraocular movements are intact, Sclera non-icteric.  Ears/Nose/Throat: Normal, Mucosa moist,Clear.  Neck/Thyroid: Supple, Non-tender, No carotid bruit, No palpable thyromegaly or thyroid nodule, No lymphadenopathy, No JVD, Full range of motion.  Respiratory: Clear to auscultation bilaterally; No wheezes, rales or rhonchi,Non-labored respirations, Symmetrical chest wall expansion.  Cardiovascular: Regular rate and rhythm, S1/S2 normal, No murmurs, rubs or gallops.  Gastrointestinal: Soft, Non-tender, Non-distended, Normal bowel sounds, No palpable organomegaly.  Musculoskeletal: Normal range of motion.  Integumentary: Warm, Dry, Intact, No suspicious lesions or rashes.  Extremities: No clubbing, cyanosis or edema  Neurologic: No focal deficits, Cranial Nerves II-XII are grossly intact, Sensory exam intact.  Psychiatric: Normal interaction, Coherent speech, Euthymic mood, Appropriate affect.        Assessment:       ICD-10-CM ICD-9-CM   1. Generalized anxiety disorder  F41.1 " 300.02   2. Convulsions, unspecified convulsion type  R56.9 780.39        Plan:     Problem List Items Addressed This Visit          Neuro    Convulsions, unspecified convulsion type     Other Visit Diagnoses       Generalized anxiety disorder    -  Primary    Relevant Medications    ALPRAZolam (XANAX) 2 MG Tab         1. Generalized anxiety disorder  - ALPRAZolam (XANAX) 2 MG Tab; Take 1 tablet (2 mg total) by mouth nightly as needed (panic attack and insomnia). Take 1 tab po prn for Insomnia and panic attack  Dispense: 30 tablet; Refill: 2  - Continue Xanax pr.n. with close monitoring, Rx Xanax refilled today. Will hold opff on labs at this time, patient to continue Hospice care.  reviewed today. Continue relaxation techniques. Will titrate medication as needed/tolerated. Notify M.D. or ER if symptoms persist or worsen, SI/HI, temp greater than 100.4, or any acute illness.    Start   /  Continue   Practice deep breathing or abdominal breathing exercises when anxiety occurs.  Exercise daily. Get sunlight daily.  Avoid caffeine, alcohol and stimulants.  Practice positive phrases and repeat throughout the day, yoga, lavender scents or Chamomile tea will help anxiety.  Set healthy boundaries, avoid people and conversations that increase stress.  Reports any symptoms of suicidal or homicidal ideations immediately, if clinic is closed go to nearest emergency room.     2. Convulsions, unspecified convulsion type  - Asymptomatic, continue to monitor. Seizure precautions encouraged. Notify M.D. or ER if symptoms persist or worsen, seizure, temp >100.4, or any acute illness.        Tessa was seen today for anxiety.    Diagnoses and all orders for this visit:    Generalized anxiety disorder  -     ALPRAZolam (XANAX) 2 MG Tab; Take 1 tablet (2 mg total) by mouth nightly as needed (panic attack and insomnia). Take 1 tab po prn for Insomnia and panic attack    Convulsions, unspecified convulsion type          Medication  List with Changes/Refills   Current Medications    CARVEDILOL (COREG) 3.125 MG TABLET    TAKE 1 TABLET BY MOUTH TWICE A DAY       Start Date: 1/9/2023  End Date: --    HYDROCORTISONE (ANUSOL-HC) 2.5 % RECTAL CREAM    Place rectally 2 (two) times daily as needed for Hemorrhoids.       Start Date: 8/5/2024  End Date: --    LEVOTHYROXINE (SYNTHROID) 75 MCG TABLET    Take 1 tablet (75 mcg total) by mouth before breakfast.       Start Date: 6/27/2024 End Date: --   Changed and/or Refilled Medications    Modified Medication Previous Medication    ALPRAZOLAM (XANAX) 2 MG TAB ALPRAZolam (XANAX) 2 MG Tab       Take 1 tablet (2 mg total) by mouth nightly as needed (panic attack and insomnia). Take 1 tab po prn for Insomnia and panic attack    Take 1 tablet (2 mg total) by mouth nightly as needed (panic attack and insomnia). Take 1 tab po prn for Insomnia and panic attack       Start Date: 8/21/2024 End Date: --    Start Date: 4/25/2024 End Date: 8/21/2024   Discontinued Medications    LACTULOSE (CHRONULAC) 10 GRAM/15 ML SOLUTION    Take 15 mLs by mouth once daily.       Start Date: 2/14/2024 End Date: 8/21/2024    MUPIROCIN (BACTROBAN) 2 % OINTMENT    Apply topically 3 (three) times daily.       Start Date: 8/28/2023 End Date: 8/21/2024    PRAVASTATIN (PRAVACHOL) 40 MG TABLET    Take 40 mg by mouth once daily.       Start Date: 4/8/2023  End Date: 8/21/2024          Follow up in about 6 months (around 2/21/2025) for Wellness.

## 2024-09-27 DIAGNOSIS — F41.1 GENERALIZED ANXIETY DISORDER: ICD-10-CM

## 2024-09-27 RX ORDER — ALPRAZOLAM 2 MG/1
2 TABLET ORAL NIGHTLY PRN
Qty: 30 TABLET | Refills: 2 | Status: SHIPPED | OUTPATIENT
Start: 2024-09-27

## 2024-10-27 DIAGNOSIS — F41.1 GENERALIZED ANXIETY DISORDER: ICD-10-CM

## 2024-10-28 RX ORDER — ALPRAZOLAM 2 MG/1
2 TABLET ORAL NIGHTLY PRN
Qty: 30 TABLET | Refills: 0 | Status: SHIPPED | OUTPATIENT
Start: 2024-10-28

## 2024-11-28 DIAGNOSIS — F41.1 GENERALIZED ANXIETY DISORDER: ICD-10-CM

## 2024-11-29 RX ORDER — ALPRAZOLAM 2 MG/1
2 TABLET ORAL NIGHTLY PRN
Qty: 30 TABLET | Refills: 0 | Status: SHIPPED | OUTPATIENT
Start: 2024-11-29